# Patient Record
Sex: FEMALE | Race: WHITE | NOT HISPANIC OR LATINO | Employment: OTHER | URBAN - METROPOLITAN AREA
[De-identification: names, ages, dates, MRNs, and addresses within clinical notes are randomized per-mention and may not be internally consistent; named-entity substitution may affect disease eponyms.]

---

## 2017-11-10 ENCOUNTER — TRANSCRIBE ORDERS (OUTPATIENT)
Dept: ADMINISTRATIVE | Facility: HOSPITAL | Age: 77
End: 2017-11-10

## 2017-11-10 DIAGNOSIS — Z12.31 VISIT FOR SCREENING MAMMOGRAM: Primary | ICD-10-CM

## 2017-12-14 ENCOUNTER — HOSPITAL ENCOUNTER (OUTPATIENT)
Dept: MAMMOGRAPHY | Facility: HOSPITAL | Age: 77
Discharge: HOME | End: 2017-12-14
Admitting: INTERNAL MEDICINE

## 2017-12-14 DIAGNOSIS — Z12.31 VISIT FOR SCREENING MAMMOGRAM: ICD-10-CM

## 2017-12-14 PROCEDURE — G0202 SCR MAMMO BI INCL CAD: HCPCS

## 2018-11-30 ENCOUNTER — TRANSCRIBE ORDERS (OUTPATIENT)
Dept: ADMINISTRATIVE | Facility: HOSPITAL | Age: 78
End: 2018-11-30

## 2018-11-30 DIAGNOSIS — Z12.31 SCREENING MAMMOGRAM, ENCOUNTER FOR: Primary | ICD-10-CM

## 2019-01-10 ENCOUNTER — HOSPITAL ENCOUNTER (OUTPATIENT)
Dept: MAMMOGRAPHY | Facility: HOSPITAL | Age: 79
Discharge: HOME | End: 2019-01-10
Admitting: INTERNAL MEDICINE

## 2019-01-10 DIAGNOSIS — Z12.31 SCREENING MAMMOGRAM, ENCOUNTER FOR: ICD-10-CM

## 2019-01-10 PROCEDURE — 77063 BREAST TOMOSYNTHESIS BI: CPT

## 2019-01-10 PROCEDURE — 77067 SCR MAMMO BI INCL CAD: CPT

## 2019-01-11 ENCOUNTER — TRANSCRIBE ORDERS (OUTPATIENT)
Dept: ADMINISTRATIVE | Facility: HOSPITAL | Age: 79
End: 2019-01-11

## 2019-01-11 DIAGNOSIS — R92.8 ABNORMAL MAMMOGRAM: Primary | ICD-10-CM

## 2019-01-18 ENCOUNTER — HOSPITAL ENCOUNTER (OUTPATIENT)
Dept: MAMMOGRAPHY | Facility: HOSPITAL | Age: 79
Discharge: HOME | End: 2019-01-18
Admitting: INTERNAL MEDICINE

## 2019-01-18 DIAGNOSIS — R92.8 ABNORMAL MAMMOGRAM: ICD-10-CM

## 2019-01-18 PROCEDURE — 77065 DX MAMMO INCL CAD UNI: CPT

## 2019-03-20 DIAGNOSIS — E78.5 HYPERLIPIDEMIA, UNSPECIFIED HYPERLIPIDEMIA TYPE: Primary | ICD-10-CM

## 2019-04-25 ENCOUNTER — OFFICE VISIT (OUTPATIENT)
Dept: INTERNAL MEDICINE | Facility: CLINIC | Age: 79
End: 2019-04-25

## 2019-04-25 VITALS
DIASTOLIC BLOOD PRESSURE: 82 MMHG | HEART RATE: 68 BPM | WEIGHT: 115.6 LBS | OXYGEN SATURATION: 97 % | SYSTOLIC BLOOD PRESSURE: 132 MMHG | RESPIRATION RATE: 20 BRPM | BODY MASS INDEX: 21.83 KG/M2 | TEMPERATURE: 98 F | HEIGHT: 61 IN

## 2019-04-25 DIAGNOSIS — G89.29 CHRONIC MIDLINE LOW BACK PAIN WITHOUT SCIATICA: ICD-10-CM

## 2019-04-25 DIAGNOSIS — M54.50 CHRONIC MIDLINE LOW BACK PAIN WITHOUT SCIATICA: ICD-10-CM

## 2019-04-25 DIAGNOSIS — E03.8 OTHER SPECIFIED HYPOTHYROIDISM: ICD-10-CM

## 2019-04-25 DIAGNOSIS — E78.49 OTHER HYPERLIPIDEMIA: Primary | ICD-10-CM

## 2019-04-25 DIAGNOSIS — I34.0 NON-RHEUMATIC MITRAL REGURGITATION: ICD-10-CM

## 2019-04-25 DIAGNOSIS — E55.9 VITAMIN D DEFICIENCY: ICD-10-CM

## 2019-04-25 PROCEDURE — 99214 OFFICE O/P EST MOD 30 MIN: CPT | Performed by: INTERNAL MEDICINE

## 2019-04-25 RX ORDER — ATORVASTATIN CALCIUM 20 MG/1
20 TABLET, FILM COATED ORAL DAILY
COMMUNITY
End: 2019-12-30

## 2019-04-25 RX ORDER — HYDROCODONE BITARTRATE AND ACETAMINOPHEN 5; 325 MG/1; MG/1
1 TABLET ORAL EVERY 6 HOURS PRN
COMMUNITY
End: 2020-03-20 | Stop reason: SDUPTHER

## 2019-04-25 RX ORDER — LEVOTHYROXINE SODIUM 75 UG/1
75 CAPSULE ORAL DAILY
COMMUNITY
End: 2019-06-17 | Stop reason: SDUPTHER

## 2019-04-25 RX ORDER — LATANOPROST 50 UG/ML
1 SOLUTION/ DROPS OPHTHALMIC NIGHTLY
COMMUNITY

## 2019-04-25 RX ORDER — LEVOTHYROXINE SODIUM 50 UG/1
50 CAPSULE ORAL DAILY
COMMUNITY
End: 2019-07-03 | Stop reason: SDUPTHER

## 2019-04-25 NOTE — PROGRESS NOTES
Subjective        Chief Complaint   Patient presents with   • Follow-up     fup labs    • Back Pain       PHQ-2 Depression Screening  Little interest or pleasure in doing things? 0   Feeling down, depressed, or hopeless? 0   PHQ-2 Total Score 0     Mammogram completed 01/10/2019  01/18/2019 repeat diagnostic mammogram     Susana Hammonds is a 78 y.o. female who presents for    Patient Active Problem List   Diagnosis   • Other hyperlipidemia   • Other specified hypothyroidism   • Vitamin D deficiency   • Chronic midline low back pain without sciatica   • Non-rheumatic mitral regurgitation       History of Present Illness     She had a MMG and she had a f/u that was benign. She thinks it was a shadow. She had a cscope with Dr. Gandara that showed one polyp and diverticulosis.. She has seen gyn and had an endometrial biopsy. She denies chest pain or hematochezia.  Allergies   Allergen Reactions   • Fentanyl Nausea And Vomiting       Current Outpatient Medications on File Prior to Visit   Medication Sig Dispense Refill   • atorvastatin (LIPITOR) 20 MG tablet Take 20 mg by mouth Daily.     • doxylamine (UNISOM) 25 MG tablet Take 25 mg by mouth At Night As Needed for Sleep.     • HYDROcodone-acetaminophen (NORCO) 5-325 MG per tablet Take 1 tablet by mouth Every 6 (Six) Hours As Needed.     • latanoprost (XALATAN) 0.005 % ophthalmic solution 1 drop Every Night.     • levothyroxine sodium (TIROSINT) 50 MCG capsule Take 50 mcg by mouth Daily.     • levothyroxine sodium (TIROSINT) 75 MCG capsule Take 75 mcg by mouth Daily.       No current facility-administered medications on file prior to visit.        Past Medical History:   Diagnosis Date   • Adenomatous colon polyp    • Adnexal cyst    • Allergic    • Cataract    • Colitis    • Depression    • Diverticulitis    • Glaucoma    • Hyperlipidemia    • Hypothyroidism    • Low back pain    • Mitral valve insufficiency    • Osteopenia    • Sleep disturbance    • Vertigo        Past  "Surgical History:   Procedure Laterality Date   • BACK SURGERY      twice. one was laminectomy with discectomy   • BREAST CYST ASPIRATION     • CATARACT EXTRACTION     • COLONOSCOPY  2019    dr ortiz   • ENDOMETRIAL BIOPSY  2019   • EYE SURGERY     • HERNIA REPAIR     • TONSILLECTOMY         Family History   Problem Relation Age of Onset   • Asthma Mother    • Pancreatic cancer Mother    • Heart disease Father    • Melanoma Son        Social History     Socioeconomic History   • Marital status:      Spouse name: Not on file   • Number of children: Not on file   • Years of education: Not on file   • Highest education level: Not on file   Tobacco Use   • Smoking status: Former Smoker     Packs/day: 0.50     Years: 20.00     Pack years: 10.00     Types: Cigarettes     Last attempt to quit: 1989     Years since quittin.0   • Smokeless tobacco: Never Used   Substance and Sexual Activity   • Alcohol use: No     Frequency: Never   • Drug use: No   • Sexual activity: Defer           The following portions of the patient's history were reviewed and updated as appropriate: problem list, allergies, current medications, past medical history, past family history, past social history and past surgical history.    Review of Systems   Respiratory: Negative for shortness of breath.    Cardiovascular: Negative for chest pain.   Gastrointestinal: Negative for blood in stool.       Immunization History   Administered Date(s) Administered   • Hepatitis A 2018, 10/19/2018   • Pneumococcal Conjugate 13-Valent (PCV13) 2015   • Pneumococcal Polysaccharide (PPSV23) 2005   • Tdap 10/01/2014       Objective   Vitals:    19 1134   BP: 132/82   Pulse: 68   Resp: 20   Temp: 98 °F (36.7 °C)   TempSrc: Oral   SpO2: 97%   Weight: 52.4 kg (115 lb 9.6 oz)   Height: 154.9 cm (61\")     Physical Exam   Constitutional: She appears well-developed and well-nourished.   HENT:   Head: Normocephalic and " atraumatic.   Cardiovascular: Normal rate, regular rhythm, S1 normal, S2 normal and normal heart sounds.   Pulmonary/Chest: Effort normal and breath sounds normal.   Neurological: She is alert.   Skin: Skin is warm.   Psychiatric: She has a normal mood and affect.   Vitals reviewed.      Procedures    Assessment/Plan   Susana was seen today for follow-up and back pain.    Diagnoses and all orders for this visit:    Other hyperlipidemia    Vitamin D deficiency  -     Vitamin D 25 hydroxy; Future    Other specified hypothyroidism  -     TSH; Future    Chronic midline low back pain without sciatica    Non-rheumatic mitral regurgitation  -     Adult Transthoracic Echo Complete W/ Cont if Necessary Per Protocol; Future               Labs reviewed as well as her cscope. LDL and TSH are at goal. I will get an echo next time. She will call when she needs to [place] for her back flares. She will start vit D 2000 units daily.  Return in about 6 months (around 10/25/2019).

## 2019-06-17 RX ORDER — LEVOTHYROXINE SODIUM 75 UG/1
75 CAPSULE ORAL DAILY
Qty: 30 CAPSULE | Refills: 6 | Status: SHIPPED | OUTPATIENT
Start: 2019-06-17 | End: 2019-07-03 | Stop reason: SDUPTHER

## 2019-07-03 ENCOUNTER — TELEPHONE (OUTPATIENT)
Dept: INTERNAL MEDICINE | Facility: CLINIC | Age: 79
End: 2019-07-03

## 2019-07-03 RX ORDER — LEVOTHYROXINE SODIUM 50 UG/1
50 CAPSULE ORAL DAILY
Qty: 30 CAPSULE | Refills: 3 | Status: SHIPPED | OUTPATIENT
Start: 2019-07-03 | End: 2020-04-06 | Stop reason: SDUPTHER

## 2019-07-03 RX ORDER — LEVOTHYROXINE SODIUM 75 UG/1
75 CAPSULE ORAL DAILY
Qty: 30 CAPSULE | Refills: 3 | Status: SHIPPED | OUTPATIENT
Start: 2019-07-03 | End: 2020-05-05

## 2019-07-03 RX ORDER — LEVOTHYROXINE SODIUM 75 UG/1
75 CAPSULE ORAL DAILY
Qty: 30 CAPSULE | Refills: 3 | Status: SHIPPED | OUTPATIENT
Start: 2019-07-03 | End: 2019-07-03 | Stop reason: SDUPTHER

## 2019-07-03 NOTE — TELEPHONE ENCOUNTER
Patient needs refill Levothyroxin 50 mg. She is totally out. Pharmacy [facility] [phone].    Thanks

## 2019-10-01 ENCOUNTER — TELEPHONE (OUTPATIENT)
Dept: INTERNAL MEDICINE | Facility: CLINIC | Age: 79
End: 2019-10-01

## 2019-10-01 NOTE — TELEPHONE ENCOUNTER
Pt calling on why you ordered a echo she was not told of this please advise she was here in April and they are now just calling on this

## 2019-10-08 DIAGNOSIS — E03.8 OTHER SPECIFIED HYPOTHYROIDISM: ICD-10-CM

## 2019-10-08 DIAGNOSIS — E55.9 VITAMIN D DEFICIENCY: ICD-10-CM

## 2019-10-08 DIAGNOSIS — E78.5 HYPERLIPIDEMIA, UNSPECIFIED HYPERLIPIDEMIA TYPE: ICD-10-CM

## 2019-10-10 LAB
25(OH)D3+25(OH)D2 SERPL-MCNC: 38 NG/ML (ref 30–100)
TSH SERPL DL<=0.005 MIU/L-ACNC: 2.14 UIU/ML (ref 0.27–4.2)

## 2019-10-11 ENCOUNTER — HOSPITAL ENCOUNTER (OUTPATIENT)
Dept: CARDIOLOGY | Facility: HOSPITAL | Age: 79
Discharge: HOME | End: 2019-10-11
Admitting: INTERNAL MEDICINE

## 2019-10-11 VITALS
WEIGHT: 115 LBS | BODY MASS INDEX: 21.71 KG/M2 | HEART RATE: 87 BPM | SYSTOLIC BLOOD PRESSURE: 120 MMHG | DIASTOLIC BLOOD PRESSURE: 80 MMHG | OXYGEN SATURATION: 97 % | HEIGHT: 61 IN

## 2019-10-11 DIAGNOSIS — I34.0 NON-RHEUMATIC MITRAL REGURGITATION: ICD-10-CM

## 2019-10-11 LAB
AORTIC ARCH: 2.18 CM
AORTIC ROOT ANNULUS: 1.8 CM
ASCENDING AORTA: 3.2 CM
BH CV ECHO MEAS - ACS: 2 CM
BH CV ECHO MEAS - AO MAX PG (FULL): 1.8 MMHG
BH CV ECHO MEAS - AO MAX PG: 4.9 MMHG
BH CV ECHO MEAS - AO MEAN PG (FULL): 0.78 MMHG
BH CV ECHO MEAS - AO MEAN PG: 2.3 MMHG
BH CV ECHO MEAS - AO ROOT AREA (BSA CORRECTED): 2
BH CV ECHO MEAS - AO ROOT AREA: 7 CM^2
BH CV ECHO MEAS - AO ROOT DIAM: 3 CM
BH CV ECHO MEAS - AO V2 MAX: 110.8 CM/SEC
BH CV ECHO MEAS - AO V2 MEAN: 71.2 CM/SEC
BH CV ECHO MEAS - AO V2 VTI: 21 CM
BH CV ECHO MEAS - ASC AORTA: 3.2 CM
BH CV ECHO MEAS - AVA(I,A): 2.2 CM^2
BH CV ECHO MEAS - AVA(I,D): 2.2 CM^2
BH CV ECHO MEAS - AVA(V,A): 2.1 CM^2
BH CV ECHO MEAS - AVA(V,D): 2.1 CM^2
BH CV ECHO MEAS - BSA(HAYCOCK): 1.5 M^2
BH CV ECHO MEAS - BSA: 1.5 M^2
BH CV ECHO MEAS - BZI_BMI: 21.7 KILOGRAMS/M^2
BH CV ECHO MEAS - BZI_METRIC_HEIGHT: 154.9 CM
BH CV ECHO MEAS - BZI_METRIC_WEIGHT: 52.2 KG
BH CV ECHO MEAS - EDV(MOD-SP2): 50 ML
BH CV ECHO MEAS - EDV(MOD-SP4): 35 ML
BH CV ECHO MEAS - EDV(TEICH): 81.2 ML
BH CV ECHO MEAS - EF(CUBED): 78.8 %
BH CV ECHO MEAS - EF(MOD-BP): 65 %
BH CV ECHO MEAS - EF(MOD-SP2): 66 %
BH CV ECHO MEAS - EF(MOD-SP4): 65.7 %
BH CV ECHO MEAS - EF(TEICH): 71.4 %
BH CV ECHO MEAS - ESV(MOD-SP2): 17 ML
BH CV ECHO MEAS - ESV(MOD-SP4): 12 ML
BH CV ECHO MEAS - ESV(TEICH): 23.3 ML
BH CV ECHO MEAS - FS: 40.3 %
BH CV ECHO MEAS - IVS/LVPW: 1.1
BH CV ECHO MEAS - IVSD: 0.97 CM
BH CV ECHO MEAS - LAT PEAK E' VEL: 6 CM/SEC
BH CV ECHO MEAS - LV DIASTOLIC VOL/BSA (35-75): 23.4 ML/M^2
BH CV ECHO MEAS - LV MASS(C)D: 123.8 GRAMS
BH CV ECHO MEAS - LV MASS(C)DI: 82.9 GRAMS/M^2
BH CV ECHO MEAS - LV MAX PG: 3.2 MMHG
BH CV ECHO MEAS - LV MEAN PG: 1.6 MMHG
BH CV ECHO MEAS - LV SYSTOLIC VOL/BSA (12-30): 8 ML/M^2
BH CV ECHO MEAS - LV V1 MAX: 88.8 CM/SEC
BH CV ECHO MEAS - LV V1 MEAN: 56.2 CM/SEC
BH CV ECHO MEAS - LV V1 VTI: 17.6 CM
BH CV ECHO MEAS - LVIDD: 4.3 CM
BH CV ECHO MEAS - LVIDS: 2.5 CM
BH CV ECHO MEAS - LVLD AP2: 6.4 CM
BH CV ECHO MEAS - LVLD AP4: 6 CM
BH CV ECHO MEAS - LVLS AP2: 5.2 CM
BH CV ECHO MEAS - LVLS AP4: 4.5 CM
BH CV ECHO MEAS - LVOT AREA (M): 2.5 CM^2
BH CV ECHO MEAS - LVOT AREA: 2.6 CM^2
BH CV ECHO MEAS - LVOT DIAM: 1.8 CM
BH CV ECHO MEAS - LVPWD: 0.86 CM
BH CV ECHO MEAS - MED PEAK E' VEL: 4 CM/SEC
BH CV ECHO MEAS - MV A DUR: 0.08 SEC
BH CV ECHO MEAS - MV A MAX VEL: 123.6 CM/SEC
BH CV ECHO MEAS - MV DEC SLOPE: 291.3 CM/SEC^2
BH CV ECHO MEAS - MV DEC TIME: 0.19 SEC
BH CV ECHO MEAS - MV E MAX VEL: 78.3 CM/SEC
BH CV ECHO MEAS - MV E/A: 0.63
BH CV ECHO MEAS - MV MAX PG: 6.2 MMHG
BH CV ECHO MEAS - MV MEAN PG: 1.9 MMHG
BH CV ECHO MEAS - MV P1/2T MAX VEL: 77.1 CM/SEC
BH CV ECHO MEAS - MV P1/2T: 77.5 MSEC
BH CV ECHO MEAS - MV V2 MAX: 124.9 CM/SEC
BH CV ECHO MEAS - MV V2 MEAN: 61.6 CM/SEC
BH CV ECHO MEAS - MV V2 VTI: 26.9 CM
BH CV ECHO MEAS - MVA P1/2T LCG: 2.9 CM^2
BH CV ECHO MEAS - MVA(P1/2T): 2.8 CM^2
BH CV ECHO MEAS - MVA(VTI): 1.7 CM^2
BH CV ECHO MEAS - PA ACC TIME: 0.09 SEC
BH CV ECHO MEAS - PA MAX PG (FULL): 1.5 MMHG
BH CV ECHO MEAS - PA MAX PG: 3.5 MMHG
BH CV ECHO MEAS - PA PR(ACCEL): 39.4 MMHG
BH CV ECHO MEAS - PA V2 MAX: 93.7 CM/SEC
BH CV ECHO MEAS - PULM A REVS DUR: 0.11 SEC
BH CV ECHO MEAS - PULM A REVS VEL: 33.3 CM/SEC
BH CV ECHO MEAS - PULM DIAS VEL: 45 CM/SEC
BH CV ECHO MEAS - PULM S/D: 1.7
BH CV ECHO MEAS - PULM SYS VEL: 77.1 CM/SEC
BH CV ECHO MEAS - PVA(V,A): 2.2 CM^2
BH CV ECHO MEAS - PVA(V,D): 2.2 CM^2
BH CV ECHO MEAS - QP/QS: 0.96
BH CV ECHO MEAS - RV MAX PG: 2 MMHG
BH CV ECHO MEAS - RV MEAN PG: 1.2 MMHG
BH CV ECHO MEAS - RV V1 MAX: 70.3 CM/SEC
BH CV ECHO MEAS - RV V1 MEAN: 49.8 CM/SEC
BH CV ECHO MEAS - RV V1 VTI: 15.1 CM
BH CV ECHO MEAS - RVOT AREA: 2.9 CM^2
BH CV ECHO MEAS - RVOT DIAM: 1.9 CM
BH CV ECHO MEAS - SI(AO): 99.4 ML/M^2
BH CV ECHO MEAS - SI(CUBED): 40.8 ML/M^2
BH CV ECHO MEAS - SI(LVOT): 31 ML/M^2
BH CV ECHO MEAS - SI(MOD-SP2): 22.1 ML/M^2
BH CV ECHO MEAS - SI(MOD-SP4): 15.4 ML/M^2
BH CV ECHO MEAS - SI(TEICH): 38.8 ML/M^2
BH CV ECHO MEAS - SUP REN AO DIAM: 1.6 CM
BH CV ECHO MEAS - SV(AO): 148.4 ML
BH CV ECHO MEAS - SV(CUBED): 60.9 ML
BH CV ECHO MEAS - SV(LVOT): 46.3 ML
BH CV ECHO MEAS - SV(MOD-SP2): 33 ML
BH CV ECHO MEAS - SV(MOD-SP4): 23 ML
BH CV ECHO MEAS - SV(RVOT): 44.5 ML
BH CV ECHO MEAS - SV(TEICH): 58 ML
BH CV ECHO MEAS - TAPSE (>1.6): 1.9 CM2
BH CV ECHO MEASUREMENTS AVERAGE E/E' RATIO: 15.66
BH CV VAS BP RIGHT ARM: NORMAL MMHG
BH CV XLRA - RV BASE: 2.45 CM
BH CV XLRA - TDI S': 13 CM/SEC
LEFT ATRIUM VOLUME INDEX: 15 ML/M2
LV EF 2D ECHO EST: 65 %
SINUS: 2.7 CM
STJ: 2.6 CM

## 2019-10-11 PROCEDURE — 93306 TTE W/DOPPLER COMPLETE: CPT | Performed by: INTERNAL MEDICINE

## 2019-10-11 PROCEDURE — 93306 TTE W/DOPPLER COMPLETE: CPT

## 2019-10-17 ENCOUNTER — OFFICE VISIT (OUTPATIENT)
Dept: INTERNAL MEDICINE | Facility: CLINIC | Age: 79
End: 2019-10-17

## 2019-10-17 VITALS
HEIGHT: 62 IN | SYSTOLIC BLOOD PRESSURE: 104 MMHG | DIASTOLIC BLOOD PRESSURE: 78 MMHG | WEIGHT: 116.6 LBS | BODY MASS INDEX: 21.46 KG/M2

## 2019-10-17 DIAGNOSIS — I34.0 NON-RHEUMATIC MITRAL REGURGITATION: Primary | ICD-10-CM

## 2019-10-17 DIAGNOSIS — E55.9 VITAMIN D DEFICIENCY: ICD-10-CM

## 2019-10-17 DIAGNOSIS — E03.8 OTHER SPECIFIED HYPOTHYROIDISM: ICD-10-CM

## 2019-10-17 PROCEDURE — 99213 OFFICE O/P EST LOW 20 MIN: CPT | Performed by: INTERNAL MEDICINE

## 2019-10-17 NOTE — PROGRESS NOTES
Subjective        Chief Complaint   Patient presents with   • Follow-up     6 month fup  labs cholesterol thyroid  fup echo    • Hyperlipidemia   • Hypothyroidism           Susana Hammonds is a 79 y.o. female who presents for    Patient Active Problem List   Diagnosis   • Other hyperlipidemia   • Other specified hypothyroidism   • Vitamin D deficiency   • Chronic midline low back pain without sciatica   • Non-rheumatic mitral regurgitation       History of Present Illness     She denies chest pain or dyspnea. She is walking 3-4 days per week without any problems.  Allergies   Allergen Reactions   • Fentanyl Nausea And Vomiting       Current Outpatient Medications on File Prior to Visit   Medication Sig Dispense Refill   • atorvastatin (LIPITOR) 20 MG tablet Take 20 mg by mouth Daily.     • latanoprost (XALATAN) 0.005 % ophthalmic solution 1 drop Every Night.     • levothyroxine sodium (TIROSINT) 50 MCG capsule Take 1 capsule by mouth Daily. 30 capsule 3   • levothyroxine sodium (TIROSINT) 75 MCG capsule Take 1 capsule by mouth Daily. 30 capsule 3   • doxylamine (UNISOM) 25 MG tablet Take 25 mg by mouth At Night As Needed for Sleep.     • HYDROcodone-acetaminophen (NORCO) 5-325 MG per tablet Take 1 tablet by mouth Every 6 (Six) Hours As Needed.       No current facility-administered medications on file prior to visit.        Past Medical History:   Diagnosis Date   • Adenomatous colon polyp    • Adnexal cyst    • Allergic    • Colitis    • Depression    • Diverticulitis    • Glaucoma    • History of allergy    • Hypercholesterolemia    • Hyperlipidemia    • Hypothyroidism    • Low back pain    • Mitral valve insufficiency     mild in 2012   • Osteopenia    • Sleep disturbance    • Vertigo        Past Surgical History:   Procedure Laterality Date   • BACK SURGERY      lower back surgery   • BREAST CYST ASPIRATION     • CATARACT EXTRACTION     • CATARACT EXTRACTION     • COLONOSCOPY  04/11/2019    dr ortiz   •  "ENDOMETRIAL BIOPSY  2019   • EYE SURGERY  2015    laser surgery   • INGUINAL HERNIA REPAIR     • LAMINECTOMY      with discectomy   • TONSILLECTOMY         Family History   Problem Relation Age of Onset   • Asthma Mother    • Pancreatic cancer Mother    • Heart disease Mother    • Heart disease Father    • Melanoma Son        Social History     Socioeconomic History   • Marital status:      Spouse name: Not on file   • Number of children: Not on file   • Years of education: Not on file   • Highest education level: Not on file   Tobacco Use   • Smoking status: Former Smoker     Packs/day: 0.50     Years: 20.00     Pack years: 10.00     Types: Cigarettes     Last attempt to quit: 1989     Years since quittin.4   • Smokeless tobacco: Never Used   Substance and Sexual Activity   • Alcohol use: Yes     Frequency: Never   • Drug use: No   • Sexual activity: Defer           The following portions of the patient's history were reviewed and updated as appropriate: problem list, allergies, current medications, past medical history, past family history, past social history and past surgical history.    Review of Systems   Respiratory: Negative for shortness of breath.    Cardiovascular: Negative for chest pain.       Immunization History   Administered Date(s) Administered   • Hepatitis A 2018, 10/19/2018   • Pneumococcal Conjugate 13-Valent (PCV13) 2015   • Pneumococcal Polysaccharide (PPSV23) 2005   • Tdap 10/01/2014       Objective   Vitals:    10/17/19 1140   BP: 104/78   Weight: 52.9 kg (116 lb 9.6 oz)   Height: 157.5 cm (62\")     Physical Exam   Constitutional: She appears well-developed and well-nourished.   HENT:   Head: Normocephalic and atraumatic.   Cardiovascular: Normal rate, regular rhythm, S1 normal, S2 normal and normal heart sounds.   Pulmonary/Chest: Effort normal and breath sounds normal.   Neurological: She is alert.   Skin: Skin is warm.   Psychiatric: She has a " normal mood and affect.   Vitals reviewed.      Procedures    Assessment/Plan   Susana was seen today for follow-up, hyperlipidemia and hypothyroidism.    Diagnoses and all orders for this visit:    Non-rheumatic mitral regurgitation    Vitamin D deficiency    Other specified hypothyroidism  -     Comprehensive Metabolic Panel; Future  -     Lipid Panel With / Chol / HDL Ratio; Future  -     TSH; Future             Reviewed TSH and echo. Vit D is good now. Her MR is stable. Declines flu shot. Track down DEXA from about 2018.    Return in about 6 months (around 4/17/2020).

## 2019-12-10 ENCOUNTER — TRANSCRIBE ORDERS (OUTPATIENT)
Dept: ADMINISTRATIVE | Facility: HOSPITAL | Age: 79
End: 2019-12-10

## 2019-12-10 DIAGNOSIS — Z12.31 SCREENING MAMMOGRAM, ENCOUNTER FOR: Primary | ICD-10-CM

## 2019-12-30 RX ORDER — ATORVASTATIN CALCIUM 20 MG/1
TABLET, FILM COATED ORAL
Qty: 90 TABLET | Refills: 2 | Status: SHIPPED | OUTPATIENT
Start: 2019-12-30 | End: 2020-10-06

## 2020-01-30 ENCOUNTER — HOSPITAL ENCOUNTER (OUTPATIENT)
Dept: MAMMOGRAPHY | Facility: HOSPITAL | Age: 80
Discharge: HOME | End: 2020-01-30
Admitting: INTERNAL MEDICINE

## 2020-01-30 DIAGNOSIS — Z12.31 SCREENING MAMMOGRAM, ENCOUNTER FOR: ICD-10-CM

## 2020-01-30 PROCEDURE — 77067 SCR MAMMO BI INCL CAD: CPT

## 2020-01-30 PROCEDURE — 77063 BREAST TOMOSYNTHESIS BI: CPT

## 2020-03-16 RX ORDER — LEVOTHYROXINE SODIUM 0.07 MG/1
TABLET ORAL
Qty: 30 TABLET | Refills: 0 | Status: SHIPPED | OUTPATIENT
Start: 2020-03-16 | End: 2020-05-04

## 2020-03-20 DIAGNOSIS — G89.29 CHRONIC MIDLINE LOW BACK PAIN WITHOUT SCIATICA: Primary | ICD-10-CM

## 2020-03-20 DIAGNOSIS — M54.50 CHRONIC MIDLINE LOW BACK PAIN WITHOUT SCIATICA: Primary | ICD-10-CM

## 2020-03-20 RX ORDER — HYDROCODONE BITARTRATE AND ACETAMINOPHEN 5; 325 MG/1; MG/1
1 TABLET ORAL EVERY 12 HOURS
Qty: 60 TABLET | Refills: 0 | Status: SHIPPED | OUTPATIENT
Start: 2020-03-20 | End: 2020-06-04 | Stop reason: SDUPTHER

## 2020-04-06 RX ORDER — LEVOTHYROXINE SODIUM 50 UG/1
50 CAPSULE ORAL DAILY
Qty: 30 CAPSULE | Refills: 3 | Status: SHIPPED | OUTPATIENT
Start: 2020-04-06 | End: 2021-01-05

## 2020-04-15 ENCOUNTER — RESULTS ENCOUNTER (OUTPATIENT)
Dept: INTERNAL MEDICINE | Facility: CLINIC | Age: 80
End: 2020-04-15

## 2020-04-15 DIAGNOSIS — E03.8 OTHER SPECIFIED HYPOTHYROIDISM: ICD-10-CM

## 2020-05-04 ENCOUNTER — TELEPHONE (OUTPATIENT)
Dept: INTERNAL MEDICINE | Facility: CLINIC | Age: 80
End: 2020-05-04

## 2020-05-04 RX ORDER — LEVOTHYROXINE SODIUM 75 UG/1
TABLET ORAL
Qty: 30 TABLET | Refills: 3 | Status: SHIPPED | OUTPATIENT
Start: 2020-05-04 | End: 2020-05-05

## 2020-05-04 NOTE — TELEPHONE ENCOUNTER
PATIENT CALLED EARLIER FOR MED REFILL FOR HER    levothyroxine sodium (TIROSINT) 75 MCG capsule    AND WHEN PHARMACY CALLED HER THEY STATED IT WAS   EUTHYROX 75 MCG tablet    SHE HAS NEVER TAKEN THIS BEFORE AND THE PRICE WAS DIFFERENT. SHE WANTS TO KNOW WHY. PLEASE CALL AND ADVISE [phone]

## 2020-05-05 DIAGNOSIS — E03.8 OTHER SPECIFIED HYPOTHYROIDISM: Primary | ICD-10-CM

## 2020-05-05 RX ORDER — LEVOTHYROXINE SODIUM 75 UG/1
75 CAPSULE ORAL DAILY
Qty: 30 CAPSULE | Refills: 3 | Status: SHIPPED | OUTPATIENT
Start: 2020-05-05 | End: 2020-12-06 | Stop reason: SDUPTHER

## 2020-05-05 NOTE — TELEPHONE ENCOUNTER
PATIENT HAS CALLED TO FOLLOW UP ON HER REQUEST TO FIND OUT WHY HER PRESCRIPTION WAS CHANGED FROM LEVOTHYROXINE TO EUTHYROX. SHE'S VERY HESITANT TO CHANGE HER PRESCRIPTION WITH OUT AN EXPLANATION.   PLEASE RETURN HER CALL [phone]

## 2020-05-18 ENCOUNTER — OFFICE VISIT (OUTPATIENT)
Dept: INTERNAL MEDICINE | Facility: CLINIC | Age: 80
End: 2020-05-18

## 2020-05-18 VITALS
SYSTOLIC BLOOD PRESSURE: 112 MMHG | BODY MASS INDEX: 21.71 KG/M2 | WEIGHT: 118 LBS | TEMPERATURE: 97.9 F | HEIGHT: 62 IN | DIASTOLIC BLOOD PRESSURE: 84 MMHG

## 2020-05-18 DIAGNOSIS — H92.02 LEFT EAR PAIN: Primary | ICD-10-CM

## 2020-05-18 DIAGNOSIS — M54.2 NECK PAIN: ICD-10-CM

## 2020-05-18 PROCEDURE — 99213 OFFICE O/P EST LOW 20 MIN: CPT | Performed by: INTERNAL MEDICINE

## 2020-05-18 RX ORDER — MELOXICAM 7.5 MG/1
7.5 TABLET ORAL DAILY
Qty: 30 TABLET | Refills: 0 | Status: SHIPPED | OUTPATIENT
Start: 2020-05-18 | End: 2020-06-29 | Stop reason: SDUPTHER

## 2020-05-18 NOTE — PROGRESS NOTES
Subjective        Chief Complaint   Patient presents with   • Earache     left   • Neck Pain     left side           Susana Hammonds is a 80 y.o. female who presents for    Patient Active Problem List   Diagnosis   • Other hyperlipidemia   • Other specified hypothyroidism   • Vitamin D deficiency   • Chronic midline low back pain without sciatica   • Non-rheumatic mitral regurgitation   • Left ear pain   • Neck pain       History of Present Illness     She has left sided neck pain for 2 weeks. She did not injure herself. She denies weakness, numbness or tingling. She has taken Ibuprofen and it upset her stomach. She took 1/2 of a Norco and it eases the pain. She now has left ear pain.   Allergies   Allergen Reactions   • Fentanyl Nausea And Vomiting       Current Outpatient Medications on File Prior to Visit   Medication Sig Dispense Refill   • atorvastatin (LIPITOR) 20 MG tablet TAKE 1 TABLET BY MOUTH ONE TIME A DAY  90 tablet 2   • HYDROcodone-acetaminophen (NORCO) 5-325 MG per tablet Take 1 tablet by mouth Every 12 (Twelve) Hours. 60 tablet 0   • latanoprost (XALATAN) 0.005 % ophthalmic solution 1 drop Every Night.     • levothyroxine sodium (TIROSINT) 50 MCG capsule Take 1 capsule by mouth Daily. 30 capsule 3   • levothyroxine sodium (TIROSINT) 75 MCG capsule Take 1 capsule by mouth Daily. 30 capsule 3   • [DISCONTINUED] doxylamine (UNISOM) 25 MG tablet Take 25 mg by mouth At Night As Needed for Sleep.       No current facility-administered medications on file prior to visit.        Past Medical History:   Diagnosis Date   • Adenomatous colon polyp    • Adnexal cyst    • Allergic    • Colitis    • Depression    • Diverticulitis    • Glaucoma    • History of allergy    • Hypercholesterolemia    • Hyperlipidemia    • Hypothyroidism    • Low back pain    • Mitral valve insufficiency     mild in 2012   • Osteopenia    • Sleep disturbance    • Vertigo        Past Surgical History:   Procedure Laterality Date   • BACK  "SURGERY      lower back surgery   • BREAST CYST ASPIRATION     • CATARACT EXTRACTION     • CATARACT EXTRACTION     • COLONOSCOPY  2019    dr ortiz   • ENDOMETRIAL BIOPSY  2019   • EYE SURGERY  2015    laser surgery   • INGUINAL HERNIA REPAIR     • LAMINECTOMY      with discectomy   • TONSILLECTOMY         Family History   Problem Relation Age of Onset   • Asthma Mother    • Pancreatic cancer Mother    • Heart disease Mother    • Heart disease Father    • Melanoma Son        Social History     Socioeconomic History   • Marital status:      Spouse name: Not on file   • Number of children: Not on file   • Years of education: Not on file   • Highest education level: Not on file   Tobacco Use   • Smoking status: Former Smoker     Packs/day: 0.50     Years: 20.00     Pack years: 10.00     Types: Cigarettes     Last attempt to quit: 1989     Years since quittin.0   • Smokeless tobacco: Never Used   Substance and Sexual Activity   • Alcohol use: Yes     Frequency: Never   • Drug use: No   • Sexual activity: Defer       Advance Care Planning   ACP discussion was held with the patient during this visit. Patient does not have an advance directive, information provided.    The following portions of the patient's history were reviewed and updated as appropriate: problem list, allergies, current medications, past medical history, past family history, past social history and past surgical history.    Review of Systems   HENT: Positive for ear pain.    Musculoskeletal: Positive for neck pain.       Immunization History   Administered Date(s) Administered   • Hepatitis A 2018, 10/19/2018   • Pneumococcal Conjugate 13-Valent (PCV13) 2015   • Pneumococcal Polysaccharide (PPSV23) 2005   • Tdap 10/01/2014       Objective   Vitals:    20 1420   BP: 112/84   Temp: 97.9 °F (36.6 °C)   Weight: 53.5 kg (118 lb)   Height: 157.5 cm (62\")     Body mass index is 21.58 kg/m².  Physical Exam "   Constitutional: She appears well-developed and well-nourished.   HENT:   Right Ear: Tympanic membrane and ear canal normal.   Left Ear: Ear canal normal.   Clear fluid left TM   Lymphadenopathy:        Right cervical: No superficial cervical adenopathy present.       Left cervical: No superficial cervical adenopathy present.   Neurological: She is alert.   Reflex Scores:       Bicep reflexes are 0 on the right side and 0 on the left side.       Brachioradialis reflexes are 0 on the right side and 0 on the left side.  5/5 strength in her arms    Neck good ROM       Skin: Skin is warm.   Psychiatric: She has a normal mood and affect.   Nursing note and vitals reviewed.      Procedures    Assessment/Plan   Susana was seen today for earache and neck pain.    Diagnoses and all orders for this visit:    Left ear pain  Comments:  Start Flonase    Neck pain  Comments:  Heat and Meloxicam. Offered PT; she wants to see how she does over the next 2 weeks  Orders:  -     meloxicam (MOBIC) 7.5 MG tablet; Take 1 tablet by mouth Daily.                 No follow-ups on file.

## 2020-05-29 LAB
ALBUMIN SERPL-MCNC: 4.8 G/DL (ref 3.5–5.2)
ALBUMIN/GLOB SERPL: 2.4 G/DL
ALP SERPL-CCNC: 60 U/L (ref 39–117)
ALT SERPL-CCNC: 18 U/L (ref 1–33)
AST SERPL-CCNC: 22 U/L (ref 1–32)
BILIRUB SERPL-MCNC: 0.6 MG/DL (ref 0.2–1.2)
BUN SERPL-MCNC: 14 MG/DL (ref 8–23)
BUN/CREAT SERPL: 19.4 (ref 7–25)
CALCIUM SERPL-MCNC: 9.9 MG/DL (ref 8.6–10.5)
CHLORIDE SERPL-SCNC: 102 MMOL/L (ref 98–107)
CHOLEST SERPL-MCNC: 167 MG/DL (ref 0–200)
CHOLEST/HDLC SERPL: 1.99 {RATIO}
CO2 SERPL-SCNC: 30.4 MMOL/L (ref 22–29)
CREAT SERPL-MCNC: 0.72 MG/DL (ref 0.57–1)
GLOBULIN SER CALC-MCNC: 2 GM/DL
GLUCOSE SERPL-MCNC: 102 MG/DL (ref 65–99)
HDLC SERPL-MCNC: 84 MG/DL (ref 40–60)
LDLC SERPL CALC-MCNC: 69 MG/DL (ref 0–100)
POTASSIUM SERPL-SCNC: 4.6 MMOL/L (ref 3.5–5.2)
PROT SERPL-MCNC: 6.8 G/DL (ref 6–8.5)
SODIUM SERPL-SCNC: 141 MMOL/L (ref 136–145)
TRIGL SERPL-MCNC: 68 MG/DL (ref 0–150)
TSH SERPL DL<=0.005 MIU/L-ACNC: 2.15 UIU/ML (ref 0.27–4.2)
VLDLC SERPL CALC-MCNC: 13.6 MG/DL (ref 5–40)

## 2020-06-04 ENCOUNTER — OFFICE VISIT (OUTPATIENT)
Dept: INTERNAL MEDICINE | Facility: CLINIC | Age: 80
End: 2020-06-04

## 2020-06-04 VITALS
DIASTOLIC BLOOD PRESSURE: 82 MMHG | WEIGHT: 115 LBS | SYSTOLIC BLOOD PRESSURE: 106 MMHG | BODY MASS INDEX: 21.16 KG/M2 | HEIGHT: 62 IN

## 2020-06-04 DIAGNOSIS — M85.9 DISORDER OF BONE DENSITY AND STRUCTURE, UNSPECIFIED: ICD-10-CM

## 2020-06-04 DIAGNOSIS — E78.49 OTHER HYPERLIPIDEMIA: ICD-10-CM

## 2020-06-04 DIAGNOSIS — E03.8 OTHER SPECIFIED HYPOTHYROIDISM: Primary | ICD-10-CM

## 2020-06-04 DIAGNOSIS — G89.29 CHRONIC MIDLINE LOW BACK PAIN WITHOUT SCIATICA: ICD-10-CM

## 2020-06-04 DIAGNOSIS — M85.80 OSTEOPENIA, UNSPECIFIED LOCATION: ICD-10-CM

## 2020-06-04 DIAGNOSIS — M54.50 CHRONIC MIDLINE LOW BACK PAIN WITHOUT SCIATICA: ICD-10-CM

## 2020-06-04 PROCEDURE — 99213 OFFICE O/P EST LOW 20 MIN: CPT | Performed by: INTERNAL MEDICINE

## 2020-06-04 RX ORDER — HYDROCODONE BITARTRATE AND ACETAMINOPHEN 5; 325 MG/1; MG/1
1 TABLET ORAL EVERY 12 HOURS
Qty: 60 TABLET | Refills: 0 | Status: SHIPPED | OUTPATIENT
Start: 2020-06-04 | End: 2020-06-12 | Stop reason: SDUPTHER

## 2020-06-04 NOTE — PROGRESS NOTES
Subjective        Chief Complaint   Patient presents with   • Hypothyroidism   • Hyperlipidemia           Susana Hammonds is a 80 y.o. female who presents for    Patient Active Problem List   Diagnosis   • Other hyperlipidemia   • Other specified hypothyroidism   • Vitamin D deficiency   • Chronic midline low back pain without sciatica   • Non-rheumatic mitral regurgitation   • Neck pain       History of Present Illness     She as no chest pain or dyspnea. Her neck pain is 75 percent better. She changed insurance last year and had issues getting more than a weeks supply of Norco.  Allergies   Allergen Reactions   • Fentanyl Nausea And Vomiting       Current Outpatient Medications on File Prior to Visit   Medication Sig Dispense Refill   • atorvastatin (LIPITOR) 20 MG tablet TAKE 1 TABLET BY MOUTH ONE TIME A DAY  90 tablet 2   • latanoprost (XALATAN) 0.005 % ophthalmic solution 1 drop Every Night.     • levothyroxine sodium (TIROSINT) 50 MCG capsule Take 1 capsule by mouth Daily. 30 capsule 3   • levothyroxine sodium (TIROSINT) 75 MCG capsule Take 1 capsule by mouth Daily. 30 capsule 3   • meloxicam (MOBIC) 7.5 MG tablet Take 1 tablet by mouth Daily. 30 tablet 0   • [DISCONTINUED] HYDROcodone-acetaminophen (NORCO) 5-325 MG per tablet Take 1 tablet by mouth Every 12 (Twelve) Hours. 60 tablet 0     No current facility-administered medications on file prior to visit.        Past Medical History:   Diagnosis Date   • Adenomatous colon polyp    • Adnexal cyst    • Allergic    • Colitis    • Depression    • Diverticulitis    • Glaucoma    • History of allergy    • Hypercholesterolemia    • Hyperlipidemia    • Hypothyroidism    • Low back pain    • Mitral valve insufficiency     mild in 2012   • Osteopenia    • Sleep disturbance    • Vertigo        Past Surgical History:   Procedure Laterality Date   • BACK SURGERY      lower back surgery   • BREAST CYST ASPIRATION     • CATARACT EXTRACTION     • CATARACT EXTRACTION     •  "COLONOSCOPY  2019    dr ortiz   • ENDOMETRIAL BIOPSY  2019   • EYE SURGERY  2015    laser surgery   • INGUINAL HERNIA REPAIR     • LAMINECTOMY      with discectomy   • TONSILLECTOMY         Family History   Problem Relation Age of Onset   • Asthma Mother    • Pancreatic cancer Mother    • Heart disease Mother    • Heart disease Father    • Melanoma Son        Social History     Socioeconomic History   • Marital status:      Spouse name: Not on file   • Number of children: Not on file   • Years of education: Not on file   • Highest education level: Not on file   Tobacco Use   • Smoking status: Former Smoker     Packs/day: 0.50     Years: 20.00     Pack years: 10.00     Types: Cigarettes     Last attempt to quit: 1989     Years since quittin.1   • Smokeless tobacco: Never Used   Substance and Sexual Activity   • Alcohol use: Yes     Frequency: Never   • Drug use: No   • Sexual activity: Defer           The following portions of the patient's history were reviewed and updated as appropriate: problem list, allergies, current medications, past medical history, past family history, past social history and past surgical history.    Review of Systems   Respiratory: Negative for shortness of breath.    Cardiovascular: Negative for chest pain.       Immunization History   Administered Date(s) Administered   • Hepatitis A 2018, 10/19/2018   • Pneumococcal Conjugate 13-Valent (PCV13) 2015   • Pneumococcal Polysaccharide (PPSV23) 2005   • Tdap 10/01/2014       Objective   Vitals:    20 1507   BP: 106/82   Weight: 52.2 kg (115 lb)   Height: 157.5 cm (62\")     Body mass index is 21.03 kg/m².  Physical Exam   Constitutional: She appears well-developed and well-nourished.   HENT:   Head: Normocephalic and atraumatic.   Cardiovascular: Normal rate, regular rhythm, S1 normal, S2 normal and normal heart sounds.   Pulmonary/Chest: Effort normal and breath sounds normal. "   Neurological: She is alert.   Skin: Skin is warm.   Psychiatric: She has a normal mood and affect.   Vitals reviewed.      Procedures    Assessment/Plan   Susana was seen today for hypothyroidism and hyperlipidemia.    Diagnoses and all orders for this visit:    Other specified hypothyroidism  -     TSH; Future    Other hyperlipidemia    Osteopenia, unspecified location  -     Vitamin D 25 hydroxy; Future  -     DEXA Bone Density Axial; Future    Disorder of bone density and structure, unspecified   -     DEXA Bone Density Axial; Future    Chronic midline low back pain without sciatica  -     HYDROcodone-acetaminophen (NORCO) 5-325 MG per tablet; Take 1 tablet by mouth Every 12 (Twelve) Hours.               Refilled [place] since [facility] gave her issues as it was a new RX to them. LDL and TSH are at goal.  Return in about 6 months (around 12/4/2020) for Medicare Wellness.

## 2020-06-12 DIAGNOSIS — G89.29 CHRONIC MIDLINE LOW BACK PAIN WITHOUT SCIATICA: ICD-10-CM

## 2020-06-12 DIAGNOSIS — M54.50 CHRONIC MIDLINE LOW BACK PAIN WITHOUT SCIATICA: ICD-10-CM

## 2020-06-12 RX ORDER — HYDROCODONE BITARTRATE AND ACETAMINOPHEN 5; 325 MG/1; MG/1
1 TABLET ORAL EVERY 12 HOURS
Qty: 60 TABLET | Refills: 0 | Status: SHIPPED | OUTPATIENT
Start: 2020-06-12 | End: 2020-06-16 | Stop reason: SDUPTHER

## 2020-06-16 DIAGNOSIS — M54.50 CHRONIC MIDLINE LOW BACK PAIN WITHOUT SCIATICA: ICD-10-CM

## 2020-06-16 DIAGNOSIS — G89.29 CHRONIC MIDLINE LOW BACK PAIN WITHOUT SCIATICA: ICD-10-CM

## 2020-06-16 RX ORDER — HYDROCODONE BITARTRATE AND ACETAMINOPHEN 5; 325 MG/1; MG/1
1 TABLET ORAL EVERY 12 HOURS
Qty: 46 TABLET | Refills: 0 | Status: SHIPPED | OUTPATIENT
Start: 2020-06-16 | End: 2021-06-11 | Stop reason: SDUPTHER

## 2020-06-16 NOTE — TELEPHONE ENCOUNTER
I wrote a message along with the request that the pharmacy only had 14 available and need the remaining 46 sent it.

## 2020-06-29 DIAGNOSIS — M54.2 NECK PAIN: ICD-10-CM

## 2020-06-29 RX ORDER — MELOXICAM 7.5 MG/1
7.5 TABLET ORAL DAILY
Qty: 30 TABLET | Refills: 0 | Status: SHIPPED | OUTPATIENT
Start: 2020-06-29 | End: 2022-09-30

## 2020-10-06 RX ORDER — ATORVASTATIN CALCIUM 20 MG/1
TABLET, FILM COATED ORAL
Qty: 90 TABLET | Refills: 0 | Status: SHIPPED | OUTPATIENT
Start: 2020-10-06 | End: 2021-01-05

## 2020-12-02 ENCOUNTER — RESULTS ENCOUNTER (OUTPATIENT)
Dept: INTERNAL MEDICINE | Facility: CLINIC | Age: 80
End: 2020-12-02

## 2020-12-02 DIAGNOSIS — M85.80 OSTEOPENIA, UNSPECIFIED LOCATION: ICD-10-CM

## 2020-12-02 DIAGNOSIS — E03.8 OTHER SPECIFIED HYPOTHYROIDISM: ICD-10-CM

## 2020-12-04 ENCOUNTER — APPOINTMENT (OUTPATIENT)
Dept: BONE DENSITY | Facility: HOSPITAL | Age: 80
End: 2020-12-04

## 2020-12-04 LAB
25(OH)D3+25(OH)D2 SERPL-MCNC: 25.4 NG/ML (ref 30–100)
TSH SERPL DL<=0.005 MIU/L-ACNC: 1.42 UIU/ML (ref 0.27–4.2)

## 2020-12-06 DIAGNOSIS — E03.8 OTHER SPECIFIED HYPOTHYROIDISM: ICD-10-CM

## 2020-12-07 ENCOUNTER — TELEPHONE (OUTPATIENT)
Dept: INTERNAL MEDICINE | Facility: CLINIC | Age: 80
End: 2020-12-07

## 2020-12-07 RX ORDER — LEVOTHYROXINE SODIUM 75 UG/1
75 CAPSULE ORAL DAILY
Qty: 30 CAPSULE | Refills: 3 | Status: SHIPPED | OUTPATIENT
Start: 2020-12-07 | End: 2021-07-06

## 2020-12-07 NOTE — TELEPHONE ENCOUNTER
Ren from [facility] states patient had levothyroxine sodium (TIROSINT) 75 MCG capsule called in but they will be over $100. He would like to know if this can be switched back to tablets as it will be cheaper for patient.     Ren can be reached at [phone].

## 2020-12-10 ENCOUNTER — OFFICE VISIT (OUTPATIENT)
Dept: INTERNAL MEDICINE | Facility: CLINIC | Age: 80
End: 2020-12-10

## 2020-12-10 VITALS
TEMPERATURE: 97.1 F | WEIGHT: 112 LBS | SYSTOLIC BLOOD PRESSURE: 122 MMHG | BODY MASS INDEX: 20.61 KG/M2 | DIASTOLIC BLOOD PRESSURE: 82 MMHG | HEIGHT: 62 IN

## 2020-12-10 DIAGNOSIS — Z00.00 MEDICARE ANNUAL WELLNESS VISIT, SUBSEQUENT: Primary | ICD-10-CM

## 2020-12-10 DIAGNOSIS — K59.09 OTHER CONSTIPATION: ICD-10-CM

## 2020-12-10 DIAGNOSIS — E78.49 OTHER HYPERLIPIDEMIA: ICD-10-CM

## 2020-12-10 DIAGNOSIS — E03.8 OTHER SPECIFIED HYPOTHYROIDISM: ICD-10-CM

## 2020-12-10 DIAGNOSIS — E55.9 VITAMIN D DEFICIENCY: ICD-10-CM

## 2020-12-10 PROCEDURE — G0439 PPPS, SUBSEQ VISIT: HCPCS | Performed by: INTERNAL MEDICINE

## 2020-12-10 NOTE — PROGRESS NOTES
The ABCs of the Annual Wellness Visit  Subsequent Medicare Wellness Visit    Chief Complaint   Patient presents with   • Medicare Wellness-subsequent       Subjective   History of Present Illness:  Susana Hammonds is a 80 y.o. female who presents for a Subsequent Medicare Wellness Visit.  She needs a laxative to help move her bowels. She has taken Miralax and OTC laxatives. She has a bowel movement daily now with Smooth Move. She had a cscope last year without evidence of CA. She drinks 32 ounce of water per day. She is anxious about having her bottom teeth pulled out. She is anxious about COVID. She lives alone. She has not seen any friends since March. She does not feel like she needs to be on medication. She denies SI.   HEALTH RISK ASSESSMENT    Recent Hospitalizations:  No hospitalization(s) within the last year.    Current Medical Providers:  Patient Care Team:  Sen Bishop MD as PCP - General  Sen Bishop MD as PCP - Family Medicine    Smoking Status:  Social History     Tobacco Use   Smoking Status Former Smoker   • Packs/day: 0.50   • Years: 20.00   • Pack years: 10.00   • Types: Cigarettes   • Quit date: 1989   • Years since quittin.6   Smokeless Tobacco Never Used       Alcohol Consumption:  Social History     Substance and Sexual Activity   Alcohol Use Yes   • Frequency: Never       Depression Screen:   PHQ-2/PHQ-9 Depression Screening 12/10/2020   Little interest or pleasure in doing things 0   Feeling down, depressed, or hopeless 0   Trouble falling or staying asleep, or sleeping too much 0   Feeling tired or having little energy 0   Poor appetite or overeating 0   Feeling bad about yourself - or that you are a failure or have let yourself or your family down 0   Trouble concentrating on things, such as reading the newspaper or watching television 0   Moving or speaking so slowly that other people could have noticed. Or the opposite - being so fidgety or restless that you have  been moving around a lot more than usual 0   Thoughts that you would be better off dead, or of hurting yourself in some way 0   Total Score 0       Fall Risk Screen:  NORMA Fall Risk Assessment was completed, and patient is at LOW risk for falls.Assessment completed on:12/10/2020    Health Habits and Functional and Cognitive Screening:  Functional & Cognitive Status 12/10/2020   Do you have difficulty preparing food and eating? No   Do you have difficulty bathing yourself, getting dressed or grooming yourself? No   Do you have difficulty using the toilet? No   Do you have difficulty moving around from place to place? No   Do you have trouble with steps or getting out of a bed or a chair? No   Current Diet Well Balanced Diet   Dental Exam Up to date   Eye Exam Up to date   Exercise (times per week) 7 times per week   Current Exercises Include Walking   Do you need help using the phone?  No   Are you deaf or do you have serious difficulty hearing?  No   Do you need help with transportation? No   Do you need help shopping? No   Do you need help preparing meals?  No   Do you need help with housework?  No   Do you need help with laundry? No   Do you need help taking your medications? No   Do you need help managing money? No   Do you ever drive or ride in a car without wearing a seat belt? No   Have you felt unusual stress, anger or loneliness in the last month? No   Who do you live with? Alone   If you need help, do you have trouble finding someone available to you? No   Have you been bothered in the last four weeks by sexual problems? No   Do you have difficulty concentrating, remembering or making decisions? No         Does the patient have evidence of cognitive impairment? No    Asprin use counseling:Does not need ASA (and currently is not on it)    Age-appropriate Screening Schedule:  Refer to the list below for future screening recommendations based on patient's age, sex and/or medical conditions. Orders for these  recommended tests are listed in the plan section. The patient has been provided with a written plan.    Health Maintenance   Topic Date Due   • ZOSTER VACCINE (1 of 2) 05/12/1989   • DXA SCAN  10/05/2020   • LIPID PANEL  05/28/2021   • TDAP/TD VACCINES (2 - Td) 10/01/2024   • INFLUENZA VACCINE  Completed          The following portions of the patient's history were reviewed and updated as appropriate: allergies, current medications, past family history, past medical history, past social history, past surgical history and problem list.    Outpatient Medications Prior to Visit   Medication Sig Dispense Refill   • atorvastatin (LIPITOR) 20 MG tablet Take 1 tablet by mouth once daily 90 tablet 0   • HYDROcodone-acetaminophen (NORCO) 5-325 MG per tablet Take 1 tablet by mouth Every 12 (Twelve) Hours. 46 tablet 0   • latanoprost (XALATAN) 0.005 % ophthalmic solution 1 drop Every Night.     • levothyroxine sodium (TIROSINT) 50 MCG capsule Take 1 capsule by mouth Daily. 30 capsule 3   • levothyroxine sodium (TIROSINT) 75 MCG capsule Take 1 capsule by mouth Daily. 30 capsule 3   • meloxicam (MOBIC) 7.5 MG tablet Take 1 tablet by mouth Daily. 30 tablet 0     No facility-administered medications prior to visit.        Patient Active Problem List   Diagnosis   • Other hyperlipidemia   • Other specified hypothyroidism   • Vitamin D deficiency   • Chronic midline low back pain without sciatica   • Non-rheumatic mitral regurgitation   • Neck pain   • Medicare annual wellness visit, subsequent   • Other constipation       Advanced Care Planning:  ACP discussion was held with the patient during this visit. Patient does not have an advance directive, information provided.    Review of Systems   Gastrointestinal: Positive for constipation.   Psychiatric/Behavioral: The patient is nervous/anxious.        Compared to one year ago, the patient feels her physical health is the same.  Compared to one year ago, the patient feels her mental  "health is worse.    Reviewed chart for potential of high risk medication in the elderly: yes  Reviewed chart for potential of harmful drug interactions in the elderly:yes    Objective         Vitals:    12/10/20 1055   BP: 122/82   Temp: 97.1 °F (36.2 °C)   Weight: 50.8 kg (112 lb)   Height: 157.5 cm (62\")       Body mass index is 20.49 kg/m².  Discussed the patient's BMI with her. The BMI is in the acceptable range.    Physical Exam  Vitals signs reviewed.   Constitutional:       Appearance: She is well-developed.   HENT:      Head: Normocephalic and atraumatic.   Neck:      Vascular: No carotid bruit.   Cardiovascular:      Rate and Rhythm: Normal rate and regular rhythm.      Heart sounds: Normal heart sounds, S1 normal and S2 normal.   Pulmonary:      Effort: Pulmonary effort is normal.      Breath sounds: Normal breath sounds.   Skin:     General: Skin is warm.   Neurological:      Mental Status: She is alert.   Psychiatric:         Behavior: Behavior normal.               Assessment/Plan   Medicare Risks and Personalized Health Plan  CMS Preventative Services Quick Reference  Advance Directive Discussion  Immunizations Discussed/Encouraged (specific immunizations; Shingrix )    The above risks/problems have been discussed with the patient.  Pertinent information has been shared with the patient in the After Visit Summary.  Follow up plans and orders are seen below in the Assessment/Plan Section.    Diagnoses and all orders for this visit:    1. Medicare annual wellness visit, subsequent (Primary)    2. Vitamin D deficiency  Comments:  Recc 2000 IU vitamin D daily. She does not want a DEXA with COVID    3. Other constipation    4. Other specified hypothyroidism  -     TSH; Future    5. Other hyperlipidemia  -     Comprehensive Metabolic Panel; Future  -     Lipid Panel With / Chol / HDL Ratio; Future      Follow Up:  Return in about 6 months (around 6/10/2021), or 30 minutes.     An After Visit Summary and PPPS " were given to the patient.         TSH is at goal. Recc Shingrix at drug store. She will continue Smooth Move and increase water to 48 ounces. She does not eat many fruits or vegetables. She declines medication for anxiety.  She had a cscope last year.

## 2020-12-29 ENCOUNTER — TRANSCRIBE ORDERS (OUTPATIENT)
Dept: ADMINISTRATIVE | Facility: HOSPITAL | Age: 80
End: 2020-12-29

## 2020-12-29 DIAGNOSIS — Z12.31 VISIT FOR SCREENING MAMMOGRAM: Primary | ICD-10-CM

## 2021-01-05 RX ORDER — ATORVASTATIN CALCIUM 20 MG/1
TABLET, FILM COATED ORAL
Qty: 90 TABLET | Refills: 0 | Status: SHIPPED | OUTPATIENT
Start: 2021-01-05 | End: 2021-04-05 | Stop reason: SDUPTHER

## 2021-01-05 RX ORDER — LEVOTHYROXINE SODIUM 50 UG/1
TABLET ORAL
Qty: 30 TABLET | Refills: 5 | Status: SHIPPED | OUTPATIENT
Start: 2021-01-05 | End: 2021-06-11

## 2021-02-26 ENCOUNTER — HOSPITAL ENCOUNTER (OUTPATIENT)
Dept: MAMMOGRAPHY | Facility: HOSPITAL | Age: 81
Discharge: HOME | End: 2021-02-26
Admitting: INTERNAL MEDICINE

## 2021-02-26 DIAGNOSIS — Z12.31 VISIT FOR SCREENING MAMMOGRAM: ICD-10-CM

## 2021-02-26 PROCEDURE — 77063 BREAST TOMOSYNTHESIS BI: CPT

## 2021-02-26 PROCEDURE — 77067 SCR MAMMO BI INCL CAD: CPT

## 2021-03-02 DIAGNOSIS — Z23 IMMUNIZATION DUE: ICD-10-CM

## 2021-04-06 RX ORDER — ATORVASTATIN CALCIUM 20 MG/1
20 TABLET, FILM COATED ORAL DAILY
Qty: 90 TABLET | Refills: 3 | Status: SHIPPED | OUTPATIENT
Start: 2021-04-06 | End: 2022-04-11

## 2021-06-03 DIAGNOSIS — E03.8 OTHER SPECIFIED HYPOTHYROIDISM: ICD-10-CM

## 2021-06-03 DIAGNOSIS — E78.49 OTHER HYPERLIPIDEMIA: ICD-10-CM

## 2021-06-04 LAB
ALBUMIN SERPL-MCNC: 4.8 G/DL (ref 3.5–5.2)
ALBUMIN/GLOB SERPL: 2.4 G/DL
ALP SERPL-CCNC: 69 U/L (ref 39–117)
ALT SERPL-CCNC: 14 U/L (ref 1–33)
AST SERPL-CCNC: 18 U/L (ref 1–32)
BILIRUB SERPL-MCNC: 0.6 MG/DL (ref 0–1.2)
BUN SERPL-MCNC: 8 MG/DL (ref 8–23)
BUN/CREAT SERPL: 10 (ref 7–25)
CALCIUM SERPL-MCNC: 10.1 MG/DL (ref 8.6–10.5)
CHLORIDE SERPL-SCNC: 104 MMOL/L (ref 98–107)
CHOLEST SERPL-MCNC: 193 MG/DL (ref 0–200)
CHOLEST/HDLC SERPL: 1.79 {RATIO}
CO2 SERPL-SCNC: 22.4 MMOL/L (ref 22–29)
CREAT SERPL-MCNC: 0.8 MG/DL (ref 0.57–1)
GLOBULIN SER CALC-MCNC: 2 GM/DL
GLUCOSE SERPL-MCNC: 84 MG/DL (ref 65–99)
HDLC SERPL-MCNC: 108 MG/DL (ref 40–60)
LDLC SERPL CALC-MCNC: 72 MG/DL (ref 0–100)
POTASSIUM SERPL-SCNC: 4.5 MMOL/L (ref 3.5–5.2)
PROT SERPL-MCNC: 6.8 G/DL (ref 6–8.5)
SODIUM SERPL-SCNC: 140 MMOL/L (ref 136–145)
TRIGL SERPL-MCNC: 73 MG/DL (ref 0–150)
TSH SERPL DL<=0.005 MIU/L-ACNC: 1.53 UIU/ML (ref 0.27–4.2)
VLDLC SERPL CALC-MCNC: 13 MG/DL (ref 5–40)

## 2021-06-11 ENCOUNTER — OFFICE VISIT (OUTPATIENT)
Dept: INTERNAL MEDICINE | Facility: CLINIC | Age: 81
End: 2021-06-11

## 2021-06-11 VITALS
HEIGHT: 62 IN | DIASTOLIC BLOOD PRESSURE: 62 MMHG | WEIGHT: 113.4 LBS | SYSTOLIC BLOOD PRESSURE: 92 MMHG | BODY MASS INDEX: 20.87 KG/M2 | TEMPERATURE: 97.8 F

## 2021-06-11 DIAGNOSIS — Z78.0 POST-MENOPAUSAL: ICD-10-CM

## 2021-06-11 DIAGNOSIS — G89.29 CHRONIC MIDLINE LOW BACK PAIN WITHOUT SCIATICA: ICD-10-CM

## 2021-06-11 DIAGNOSIS — E78.49 OTHER HYPERLIPIDEMIA: Primary | Chronic | ICD-10-CM

## 2021-06-11 DIAGNOSIS — E03.8 OTHER SPECIFIED HYPOTHYROIDISM: Chronic | ICD-10-CM

## 2021-06-11 DIAGNOSIS — M54.50 CHRONIC MIDLINE LOW BACK PAIN WITHOUT SCIATICA: ICD-10-CM

## 2021-06-11 DIAGNOSIS — K59.09 OTHER CONSTIPATION: Chronic | ICD-10-CM

## 2021-06-11 PROCEDURE — 99214 OFFICE O/P EST MOD 30 MIN: CPT | Performed by: INTERNAL MEDICINE

## 2021-06-11 RX ORDER — HYDROCODONE BITARTRATE AND ACETAMINOPHEN 5; 325 MG/1; MG/1
1 TABLET ORAL EVERY 12 HOURS
Qty: 46 TABLET | Refills: 0 | Status: SHIPPED | OUTPATIENT
Start: 2021-06-11 | End: 2021-12-16 | Stop reason: SDUPTHER

## 2021-06-11 RX ORDER — LEVOTHYROXINE SODIUM 0.05 MG/1
50 TABLET ORAL DAILY
COMMUNITY
End: 2021-12-20

## 2021-06-11 NOTE — PROGRESS NOTES
Subjective        Chief Complaint   Patient presents with   • Hypothyroidism           Susana Hammonds is a 81 y.o. female who presents for    Patient Active Problem List   Diagnosis   • Other hyperlipidemia   • Other specified hypothyroidism   • Vitamin D deficiency   • Chronic midline low back pain without sciatica   • Non-rheumatic mitral regurgitation   • Neck pain   • Other constipation       History of Present Illness     She needs a refill on her Norco; she gets it filled once per year for her back pain. She is not exercising except walking her dog. Her constipation has become a chronic issue. She was taking Miralax daily and it quit working. She tried dulcolax and it works. She denies melena or hematochezia.  Allergies   Allergen Reactions   • Fentanyl Nausea And Vomiting       Current Outpatient Medications on File Prior to Visit   Medication Sig Dispense Refill   • atorvastatin (LIPITOR) 20 MG tablet Take 1 tablet by mouth Daily. 90 tablet 3   • latanoprost (XALATAN) 0.005 % ophthalmic solution 1 drop Every Night.     • levothyroxine (SYNTHROID, LEVOTHROID) 50 MCG tablet Take 50 mcg by mouth Daily.     • levothyroxine sodium (TIROSINT) 75 MCG capsule Take 1 capsule by mouth Daily. 30 capsule 3   • meloxicam (MOBIC) 7.5 MG tablet Take 1 tablet by mouth Daily. 30 tablet 0   • [DISCONTINUED] Doxylamine Succinate, Sleep, (SLEEP AID PO) Take  by mouth.     • [DISCONTINUED] HYDROcodone-acetaminophen (NORCO) 5-325 MG per tablet Take 1 tablet by mouth Every 12 (Twelve) Hours. 46 tablet 0   • [DISCONTINUED] Euthyrox 50 MCG tablet Take 1 tablet by mouth once daily 30 tablet 5     No current facility-administered medications on file prior to visit.       Past Medical History:   Diagnosis Date   • Adenomatous colon polyp    • Adnexal cyst    • Allergic    • Colitis    • Depression    • Diverticulitis    • Glaucoma    • Low back pain    • Mitral valve insufficiency     mild in 2012   • Osteopenia    • Sleep disturbance   "  • Vertigo        Past Surgical History:   Procedure Laterality Date   • BACK SURGERY      lower back surgery   • BREAST CYST ASPIRATION     • CATARACT EXTRACTION     • CATARACT EXTRACTION     • COLONOSCOPY  2019    dr ortiz   • ENDOMETRIAL BIOPSY  2019   • EYE SURGERY  2015    laser surgery   • INGUINAL HERNIA REPAIR     • LAMINECTOMY      with discectomy   • TONSILLECTOMY         Family History   Problem Relation Age of Onset   • Asthma Mother    • Pancreatic cancer Mother    • Heart disease Mother    • Heart disease Father    • Melanoma Son    • Breast cancer Neg Hx        Social History     Socioeconomic History   • Marital status:      Spouse name: Not on file   • Number of children: Not on file   • Years of education: Not on file   • Highest education level: Not on file   Tobacco Use   • Smoking status: Former Smoker     Packs/day: 0.50     Years: 20.00     Pack years: 10.00     Types: Cigarettes     Quit date: 1989     Years since quittin.1   • Smokeless tobacco: Never Used   Substance and Sexual Activity   • Alcohol use: Yes   • Drug use: No   • Sexual activity: Defer           The following portions of the patient's history were reviewed and updated as appropriate: problem list, allergies, current medications, past medical history, past family history, past social history and past surgical history.    Review of Systems    Immunization History   Administered Date(s) Administered   • COVID-19 (PFIZER) 2021, 2021   • Fluzone High Dose =>65 Years ([employer] ONLY) 10/11/2020   • Hepatitis A 2018, 10/19/2018   • Pneumococcal Conjugate 13-Valent (PCV13) 2015   • Pneumococcal Polysaccharide (PPSV23) 2005   • Tdap 10/01/2014       Objective   Vitals:    21 1122   BP: 92/62   Temp: 97.8 °F (36.6 °C)   Weight: 51.4 kg (113 lb 6.4 oz)   Height: 157.5 cm (62\")     Body mass index is 20.74 kg/m².  Physical Exam  Vitals reviewed.   Constitutional:       " Appearance: She is well-developed.   HENT:      Head: Normocephalic and atraumatic.   Cardiovascular:      Rate and Rhythm: Normal rate and regular rhythm.      Heart sounds: Normal heart sounds, S1 normal and S2 normal.      Comments: Slight edema at left ankle  Pulmonary:      Effort: Pulmonary effort is normal.      Breath sounds: Normal breath sounds.   Abdominal:      Palpations: Abdomen is soft. There is no mass.      Tenderness: There is no abdominal tenderness.   Skin:     General: Skin is warm.   Neurological:      Mental Status: She is alert.   Psychiatric:         Behavior: Behavior normal.         Procedures    Assessment/Plan   Diagnoses and all orders for this visit:    1. Other hyperlipidemia (Primary)    2. Other specified hypothyroidism  -     TSH; Future    3. Other constipation    4. Post-menopausal  -     DEXA Bone Density Axial; Future    5. Chronic midline low back pain without sciatica  -     HYDROcodone-acetaminophen (NORCO) 5-325 MG per tablet; Take 1 tablet by mouth Every 12 (Twelve) Hours.  Dispense: 46 tablet; Refill: 0             Reviewed TSH, CMP and FLP. YARED is appropriate. Refill Norco; RX lasts a year. Continue dulcolax as keeps her bowels regular.    Return in about 6 months (around 12/11/2021) for Lab Before [facility], Medicare Wellness.

## 2021-06-18 ENCOUNTER — TELEPHONE (OUTPATIENT)
Dept: INTERNAL MEDICINE | Facility: CLINIC | Age: 81
End: 2021-06-18

## 2021-06-21 ENCOUNTER — OFFICE VISIT (OUTPATIENT)
Dept: INTERNAL MEDICINE | Facility: CLINIC | Age: 81
End: 2021-06-21

## 2021-06-21 VITALS
SYSTOLIC BLOOD PRESSURE: 122 MMHG | HEIGHT: 62 IN | DIASTOLIC BLOOD PRESSURE: 80 MMHG | BODY MASS INDEX: 20.98 KG/M2 | TEMPERATURE: 97.8 F | WEIGHT: 114 LBS

## 2021-06-21 DIAGNOSIS — K59.09 OTHER CONSTIPATION: Chronic | ICD-10-CM

## 2021-06-21 DIAGNOSIS — R14.0 BLOATING: Primary | ICD-10-CM

## 2021-06-21 PROCEDURE — 99214 OFFICE O/P EST MOD 30 MIN: CPT | Performed by: INTERNAL MEDICINE

## 2021-06-21 NOTE — PROGRESS NOTES
Subjective        Chief Complaint   Patient presents with   • Constipation   • GI Problem           Susana Hammonds is a 81 y.o. female who presents for    Patient Active Problem List   Diagnosis   • Other hyperlipidemia   • Other specified hypothyroidism   • Vitamin D deficiency   • Chronic midline low back pain without sciatica   • Non-rheumatic mitral regurgitation   • Neck pain   • Other constipation       History of Present Illness     When she eats, her stomach growls but she does not hurt. She has nausea in the am once per month. She denies vomiting, melena or hematochezia. She thinks her weight is down 2-3 pounds in the last two months. She has a bowel movement with dulcolax; she takes it every other day. She had a cscope 2 years ago. She had a cyst on one ovary 2 years ago. She can get distended in her abdomen. She can feel bloated after eating. She has started some phazyme and it has helped her stomach noises. She has 32 ounces of water per day.   Allergies   Allergen Reactions   • Fentanyl Nausea And Vomiting       Current Outpatient Medications on File Prior to Visit   Medication Sig Dispense Refill   • atorvastatin (LIPITOR) 20 MG tablet Take 1 tablet by mouth Daily. 90 tablet 3   • HYDROcodone-acetaminophen (NORCO) 5-325 MG per tablet Take 1 tablet by mouth Every 12 (Twelve) Hours. 46 tablet 0   • latanoprost (XALATAN) 0.005 % ophthalmic solution 1 drop Every Night.     • levothyroxine (SYNTHROID, LEVOTHROID) 50 MCG tablet Take 50 mcg by mouth Daily.     • levothyroxine sodium (TIROSINT) 75 MCG capsule Take 1 capsule by mouth Daily. 30 capsule 3   • meloxicam (MOBIC) 7.5 MG tablet Take 1 tablet by mouth Daily. 30 tablet 0     No current facility-administered medications on file prior to visit.       Past Medical History:   Diagnosis Date   • Adenomatous colon polyp    • Adnexal cyst    • Allergic    • Colitis    • Depression    • Diverticulitis    • Glaucoma    • Low back pain    • Mitral valve  "insufficiency     mild in 2012   • Osteopenia    • Sleep disturbance    • Vertigo        Past Surgical History:   Procedure Laterality Date   • BACK SURGERY      lower back surgery   • BREAST CYST ASPIRATION     • CATARACT EXTRACTION     • CATARACT EXTRACTION     • COLONOSCOPY  2019    dr ortiz   • ENDOMETRIAL BIOPSY  2019   • EYE SURGERY  2015    laser surgery   • INGUINAL HERNIA REPAIR     • LAMINECTOMY      with discectomy   • TONSILLECTOMY         Family History   Problem Relation Age of Onset   • Asthma Mother    • Pancreatic cancer Mother    • Heart disease Mother    • Heart disease Father    • Melanoma Son    • Breast cancer Neg Hx        Social History     Socioeconomic History   • Marital status:      Spouse name: Not on file   • Number of children: Not on file   • Years of education: Not on file   • Highest education level: Not on file   Tobacco Use   • Smoking status: Former Smoker     Packs/day: 0.50     Years: 20.00     Pack years: 10.00     Types: Cigarettes     Quit date: 1989     Years since quittin.1   • Smokeless tobacco: Never Used   Substance and Sexual Activity   • Alcohol use: Yes   • Drug use: No   • Sexual activity: Defer           The following portions of the patient's history were reviewed and updated as appropriate: problem list, allergies, current medications, past medical history, past family history, past social history and past surgical history.    Review of Systems    Immunization History   Administered Date(s) Administered   • COVID-19 (PFIZER) 2021, 2021   • Fluzone High Dose =>65 Years ([employer] ONLY) 10/11/2020   • Hepatitis A 2018, 10/19/2018   • Pneumococcal Conjugate 13-Valent (PCV13) 2015   • Pneumococcal Polysaccharide (PPSV23) 2005   • Tdap 10/01/2014       Objective   Vitals:    21 1257   BP: 122/80   Temp: 97.8 °F (36.6 °C)   Weight: 51.7 kg (114 lb)   Height: 157.5 cm (62\")     Body mass index is 20.85 " kg/m².  Physical Exam  Vitals reviewed.   Constitutional:       Appearance: She is well-developed.   HENT:      Head: Normocephalic and atraumatic.   Cardiovascular:      Rate and Rhythm: Normal rate and regular rhythm.      Heart sounds: Normal heart sounds, S1 normal and S2 normal.   Pulmonary:      Effort: Pulmonary effort is normal.      Breath sounds: Normal breath sounds.   Abdominal:      Palpations: Abdomen is soft. There is no mass.      Tenderness: There is no abdominal tenderness.   Skin:     General: Skin is warm.   Neurological:      Mental Status: She is alert.   Psychiatric:         Behavior: Behavior normal.         Procedures    Assessment/Plan   Diagnoses and all orders for this visit:    1. Bloating (Primary)  Comments:  Get CT. We can also look at ovaries at same time  Orders:  -     CBC & Differential  -     CT Abdomen Pelvis With Contrast    2. Other constipation  Comments:  Discussed she can continue Dulcolax since it works well and she has tried multiple other OTCs. I sent in Linzess. She will price. She had a cscope 2 years ago  Orders:  -     CT Abdomen Pelvis With Contrast  -     linaclotide (Linzess) 72 MCG capsule capsule; Take 1 capsule by mouth Daily.  Dispense: 30 capsule; Refill: 3                 No follow-ups on file.

## 2021-06-22 LAB
BASOPHILS # BLD AUTO: 0.03 10*3/MM3 (ref 0–0.2)
BASOPHILS NFR BLD AUTO: 0.5 % (ref 0–1.5)
EOSINOPHIL # BLD AUTO: 0.04 10*3/MM3 (ref 0–0.4)
EOSINOPHIL NFR BLD AUTO: 0.7 % (ref 0.3–6.2)
ERYTHROCYTE [DISTWIDTH] IN BLOOD BY AUTOMATED COUNT: 13.1 % (ref 12.3–15.4)
HCT VFR BLD AUTO: 46.3 % (ref 34–46.6)
HGB BLD-MCNC: 15.7 G/DL (ref 12–15.9)
IMM GRANULOCYTES # BLD AUTO: 0.02 10*3/MM3 (ref 0–0.05)
IMM GRANULOCYTES NFR BLD AUTO: 0.3 % (ref 0–0.5)
LYMPHOCYTES # BLD AUTO: 1.29 10*3/MM3 (ref 0.7–3.1)
LYMPHOCYTES NFR BLD AUTO: 21.6 % (ref 19.6–45.3)
MCH RBC QN AUTO: 31.3 PG (ref 26.6–33)
MCHC RBC AUTO-ENTMCNC: 33.9 G/DL (ref 31.5–35.7)
MCV RBC AUTO: 92.2 FL (ref 79–97)
MONOCYTES # BLD AUTO: 0.65 10*3/MM3 (ref 0.1–0.9)
MONOCYTES NFR BLD AUTO: 10.9 % (ref 5–12)
NEUTROPHILS # BLD AUTO: 3.94 10*3/MM3 (ref 1.7–7)
NEUTROPHILS NFR BLD AUTO: 66 % (ref 42.7–76)
NRBC BLD AUTO-RTO: 0 /100 WBC (ref 0–0.2)
PLATELET # BLD AUTO: 257 10*3/MM3 (ref 140–450)
RBC # BLD AUTO: 5.02 10*6/MM3 (ref 3.77–5.28)
WBC # BLD AUTO: 5.97 10*3/MM3 (ref 3.4–10.8)

## 2021-07-04 DIAGNOSIS — E03.8 OTHER SPECIFIED HYPOTHYROIDISM: ICD-10-CM

## 2021-07-06 RX ORDER — LEVOTHYROXINE SODIUM 0.07 MG/1
TABLET ORAL
Qty: 30 TABLET | Refills: 11 | Status: SHIPPED | OUTPATIENT
Start: 2021-07-06 | End: 2022-10-17

## 2021-07-10 ENCOUNTER — HOSPITAL ENCOUNTER (OUTPATIENT)
Dept: CT IMAGING | Facility: HOSPITAL | Age: 81
Discharge: HOME | End: 2021-07-10
Admitting: INTERNAL MEDICINE

## 2021-07-10 PROCEDURE — 25010000002 IOPAMIDOL 61 % SOLUTION: Performed by: INTERNAL MEDICINE

## 2021-07-10 PROCEDURE — 82565 ASSAY OF CREATININE: CPT

## 2021-07-10 PROCEDURE — 74177 CT ABD & PELVIS W/CONTRAST: CPT

## 2021-07-10 PROCEDURE — 0 DIATRIZOATE MEGLUMINE & SODIUM PER 1 ML: Performed by: INTERNAL MEDICINE

## 2021-07-10 RX ADMIN — DIATRIZOATE MEGLUMINE AND DIATRIZOATE SODIUM 30 ML: 660; 100 LIQUID ORAL; RECTAL at 12:10

## 2021-07-10 RX ADMIN — IOPAMIDOL 85 ML: 612 INJECTION, SOLUTION INTRAVENOUS at 13:20

## 2021-07-12 LAB — CREAT BLDA-MCNC: 0.7 MG/DL (ref 0.6–1.3)

## 2021-12-03 ENCOUNTER — HOSPITAL ENCOUNTER (OUTPATIENT)
Dept: BONE DENSITY | Facility: HOSPITAL | Age: 81
Discharge: HOME | End: 2021-12-03
Admitting: INTERNAL MEDICINE

## 2021-12-03 DIAGNOSIS — Z78.0 POST-MENOPAUSAL: ICD-10-CM

## 2021-12-03 PROCEDURE — 77080 DXA BONE DENSITY AXIAL: CPT

## 2021-12-09 ENCOUNTER — TELEPHONE (OUTPATIENT)
Dept: INTERNAL MEDICINE | Facility: CLINIC | Age: 81
End: 2021-12-09

## 2021-12-09 NOTE — TELEPHONE ENCOUNTER
Patient is coming in for her labs tomorrow and I told her that the only lab in the system at this time was a TSH.   She said that she is also supposed to have other labs that she gets every 6 months if you would please put these in the system.  Thank you

## 2021-12-16 ENCOUNTER — OFFICE VISIT (OUTPATIENT)
Dept: INTERNAL MEDICINE | Facility: CLINIC | Age: 81
End: 2021-12-16

## 2021-12-16 VITALS
TEMPERATURE: 97.8 F | BODY MASS INDEX: 20.06 KG/M2 | SYSTOLIC BLOOD PRESSURE: 132 MMHG | WEIGHT: 109 LBS | HEIGHT: 62 IN | DIASTOLIC BLOOD PRESSURE: 82 MMHG

## 2021-12-16 DIAGNOSIS — G89.29 CHRONIC MIDLINE LOW BACK PAIN WITHOUT SCIATICA: ICD-10-CM

## 2021-12-16 DIAGNOSIS — E03.8 OTHER SPECIFIED HYPOTHYROIDISM: Chronic | ICD-10-CM

## 2021-12-16 DIAGNOSIS — Z12.31 ENCOUNTER FOR SCREENING MAMMOGRAM FOR MALIGNANT NEOPLASM OF BREAST: ICD-10-CM

## 2021-12-16 DIAGNOSIS — K59.09 OTHER CONSTIPATION: Chronic | ICD-10-CM

## 2021-12-16 DIAGNOSIS — M54.50 CHRONIC MIDLINE LOW BACK PAIN WITHOUT SCIATICA: ICD-10-CM

## 2021-12-16 DIAGNOSIS — Z00.00 MEDICARE ANNUAL WELLNESS VISIT, SUBSEQUENT: Primary | ICD-10-CM

## 2021-12-16 DIAGNOSIS — E78.49 OTHER HYPERLIPIDEMIA: Chronic | ICD-10-CM

## 2021-12-16 DIAGNOSIS — S46.211A BICEPS STRAIN, RIGHT, INITIAL ENCOUNTER: ICD-10-CM

## 2021-12-16 PROCEDURE — 90662 IIV NO PRSV INCREASED AG IM: CPT | Performed by: INTERNAL MEDICINE

## 2021-12-16 PROCEDURE — G0439 PPPS, SUBSEQ VISIT: HCPCS | Performed by: INTERNAL MEDICINE

## 2021-12-16 PROCEDURE — 1159F MED LIST DOCD IN RCRD: CPT | Performed by: INTERNAL MEDICINE

## 2021-12-16 PROCEDURE — G0008 ADMIN INFLUENZA VIRUS VAC: HCPCS | Performed by: INTERNAL MEDICINE

## 2021-12-16 PROCEDURE — 1125F AMNT PAIN NOTED PAIN PRSNT: CPT | Performed by: INTERNAL MEDICINE

## 2021-12-16 RX ORDER — HYDROCODONE BITARTRATE AND ACETAMINOPHEN 5; 325 MG/1; MG/1
1 TABLET ORAL EVERY 12 HOURS
Qty: 46 TABLET | Refills: 0 | Status: SHIPPED | OUTPATIENT
Start: 2021-12-16 | End: 2022-08-12 | Stop reason: SDUPTHER

## 2021-12-16 RX ORDER — BISACODYL 5 MG/1
5 TABLET, DELAYED RELEASE ORAL DAILY PRN
COMMUNITY
End: 2022-09-30

## 2021-12-20 RX ORDER — LEVOTHYROXINE SODIUM 0.05 MG/1
TABLET ORAL
Qty: 30 TABLET | Refills: 11 | Status: SHIPPED | OUTPATIENT
Start: 2021-12-20

## 2021-12-27 ENCOUNTER — TRANSCRIBE ORDERS (OUTPATIENT)
Dept: ADMINISTRATIVE | Facility: HOSPITAL | Age: 81
End: 2021-12-27

## 2021-12-27 DIAGNOSIS — Z12.31 VISIT FOR SCREENING MAMMOGRAM: Primary | ICD-10-CM

## 2022-01-13 ENCOUNTER — OFFICE VISIT (OUTPATIENT)
Dept: INTERNAL MEDICINE | Facility: CLINIC | Age: 82
End: 2022-01-13

## 2022-01-13 VITALS
BODY MASS INDEX: 19.91 KG/M2 | OXYGEN SATURATION: 99 % | TEMPERATURE: 97.3 F | HEART RATE: 63 BPM | HEIGHT: 62 IN | WEIGHT: 108.2 LBS

## 2022-01-13 DIAGNOSIS — L29.9 ITCH: Primary | ICD-10-CM

## 2022-01-13 PROCEDURE — 99213 OFFICE O/P EST LOW 20 MIN: CPT | Performed by: INTERNAL MEDICINE

## 2022-01-13 NOTE — PROGRESS NOTES
Subjective        Chief Complaint   Patient presents with   • Itching     BUTTOCK WASIT LINE    • Bloated           Susana Hammonds is a 81 y.o. female who presents for    Patient Active Problem List   Diagnosis   • Other hyperlipidemia   • Other specified hypothyroidism   • Vitamin D deficiency   • Chronic midline low back pain without sciatica   • Non-rheumatic mitral regurgitation   • Neck pain   • Other constipation       History of Present Illness     She started itching was around the next day after her flu shot. She never saw a rash. Prior she had not changed soaps or detergents. She did change them afterwards. The itch has stopped.  Allergies   Allergen Reactions   • Fentanyl Nausea And Vomiting       Current Outpatient Medications on File Prior to Visit   Medication Sig Dispense Refill   • atorvastatin (LIPITOR) 20 MG tablet Take 1 tablet by mouth Daily. 90 tablet 3   • bisacodyl (DULCOLAX) 5 MG EC tablet Take 5 mg by mouth Daily As Needed.     • HYDROcodone-acetaminophen (NORCO) 5-325 MG per tablet Take 1 tablet by mouth Every 12 (Twelve) Hours. 46 tablet 0   • latanoprost (XALATAN) 0.005 % ophthalmic solution 1 drop Every Night.     • levothyroxine (SYNTHROID, LEVOTHROID) 50 MCG tablet Take 1 tablet by mouth once daily 30 tablet 11   • levothyroxine (SYNTHROID, LEVOTHROID) 75 MCG tablet Take 1 tablet by mouth once daily 30 tablet 11   • meloxicam (MOBIC) 7.5 MG tablet Take 1 tablet by mouth Daily. 30 tablet 0     No current facility-administered medications on file prior to visit.       Past Medical History:   Diagnosis Date   • Adenomatous colon polyp    • Adnexal cyst    • Allergic    • Colitis    • Depression    • Diverticulitis    • Glaucoma    • Low back pain    • Mitral valve insufficiency     mild in 2012   • Osteopenia    • Sleep disturbance    • Vertigo        Past Surgical History:   Procedure Laterality Date   • BACK SURGERY      lower back surgery   • BREAST CYST ASPIRATION     • CATARACT  "EXTRACTION     • COLONOSCOPY  2019    dr ortiz   • ENDOMETRIAL BIOPSY  2019   • EYE SURGERY  2015    laser surgery   • INGUINAL HERNIA REPAIR     • LAMINECTOMY      with discectomy   • TONSILLECTOMY         Family History   Problem Relation Age of Onset   • Asthma Mother    • Pancreatic cancer Mother    • Heart disease Mother    • Heart disease Father    • Melanoma Son    • Other Brother         back surgery   • Breast cancer Neg Hx        Social History     Socioeconomic History   • Marital status:    Tobacco Use   • Smoking status: Former Smoker     Packs/day: 0.50     Years: 20.00     Pack years: 10.00     Types: Cigarettes     Quit date: 1989     Years since quittin.7   • Smokeless tobacco: Never Used   Vaping Use   • Vaping Use: Never used   Substance and Sexual Activity   • Alcohol use: Yes   • Drug use: No   • Sexual activity: Defer           The following portions of the patient's history were reviewed and updated as appropriate: problem list, allergies, current medications, past medical history, past family history, past social history and past surgical history.    Review of Systems    Immunization History   Administered Date(s) Administered   • COVID-19 (PFIZER) 2021, 2021, 2021   • Fluzone High Dose =>65 Years ([employer] ONLY) 10/11/2020   • Fluzone High-Dose 65+yrs 2021   • Hepatitis A 2018, 10/19/2018   • Pneumococcal Conjugate 13-Valent (PCV13) 2015   • Pneumococcal Polysaccharide (PPSV23) 2005   • Tdap 10/01/2014       Objective   Vitals:    22 1358   Pulse: 63   Temp: 97.3 °F (36.3 °C)   SpO2: 99%   Weight: 49.1 kg (108 lb 3.2 oz)   Height: 157.5 cm (62.01\")     Body mass index is 19.78 kg/m².  Physical Exam  Constitutional:       Appearance: Normal appearance.   Abdominal:      General: There is no distension.      Palpations: There is no mass.      Tenderness: There is no abdominal tenderness. There is no guarding. "   Skin:     Comments: No rash on her abdomen   Neurological:      Mental Status: She is alert.         Procedures    Assessment/Plan   Diagnoses and all orders for this visit:    1. Itch (Primary)        Resolved. Call if reoccurs and would get blood.         No follow-ups on file.

## 2022-02-14 ENCOUNTER — OFFICE VISIT (OUTPATIENT)
Dept: INTERNAL MEDICINE | Facility: CLINIC | Age: 82
End: 2022-02-14

## 2022-02-14 ENCOUNTER — HOSPITAL ENCOUNTER (OUTPATIENT)
Dept: CARDIOLOGY | Facility: HOSPITAL | Age: 82
Discharge: HOME | End: 2022-02-14
Admitting: INTERNAL MEDICINE

## 2022-02-14 VITALS — TEMPERATURE: 97.8 F | WEIGHT: 112 LBS | HEIGHT: 62 IN | BODY MASS INDEX: 20.61 KG/M2

## 2022-02-14 DIAGNOSIS — M79.89 CALF SWELLING: Primary | ICD-10-CM

## 2022-02-14 DIAGNOSIS — M79.89 CALF SWELLING: ICD-10-CM

## 2022-02-14 LAB
BH CV LOWER VASCULAR LEFT COMMON FEMORAL AUGMENT: NORMAL
BH CV LOWER VASCULAR LEFT COMMON FEMORAL COMPETENT: NORMAL
BH CV LOWER VASCULAR LEFT COMMON FEMORAL COMPRESS: NORMAL
BH CV LOWER VASCULAR LEFT COMMON FEMORAL PHASIC: NORMAL
BH CV LOWER VASCULAR LEFT COMMON FEMORAL SPONT: NORMAL
BH CV LOWER VASCULAR LEFT DISTAL FEMORAL COMPRESS: NORMAL
BH CV LOWER VASCULAR LEFT GASTRONEMIUS COMPRESS: NORMAL
BH CV LOWER VASCULAR LEFT GREATER SAPH AK COMPRESS: NORMAL
BH CV LOWER VASCULAR LEFT GREATER SAPH BK COMPRESS: NORMAL
BH CV LOWER VASCULAR LEFT LESSER SAPH COMPRESS: NORMAL
BH CV LOWER VASCULAR LEFT MID FEMORAL AUGMENT: NORMAL
BH CV LOWER VASCULAR LEFT MID FEMORAL COMPETENT: NORMAL
BH CV LOWER VASCULAR LEFT MID FEMORAL COMPRESS: NORMAL
BH CV LOWER VASCULAR LEFT MID FEMORAL PHASIC: NORMAL
BH CV LOWER VASCULAR LEFT MID FEMORAL SPONT: NORMAL
BH CV LOWER VASCULAR LEFT PERONEAL COMPRESS: NORMAL
BH CV LOWER VASCULAR LEFT POPLITEAL AUGMENT: NORMAL
BH CV LOWER VASCULAR LEFT POPLITEAL COMPETENT: NORMAL
BH CV LOWER VASCULAR LEFT POPLITEAL COMPRESS: NORMAL
BH CV LOWER VASCULAR LEFT POPLITEAL PHASIC: NORMAL
BH CV LOWER VASCULAR LEFT POPLITEAL SPONT: NORMAL
BH CV LOWER VASCULAR LEFT POSTERIOR TIBIAL COMPRESS: NORMAL
BH CV LOWER VASCULAR LEFT PROFUNDA FEMORAL COMPRESS: NORMAL
BH CV LOWER VASCULAR LEFT PROXIMAL FEMORAL COMPRESS: NORMAL
BH CV LOWER VASCULAR LEFT SAPHENOFEMORAL JUNCTION COMPRESS: NORMAL
BH CV LOWER VASCULAR RIGHT COMMON FEMORAL AUGMENT: NORMAL
BH CV LOWER VASCULAR RIGHT COMMON FEMORAL COMPETENT: NORMAL
BH CV LOWER VASCULAR RIGHT COMMON FEMORAL COMPRESS: NORMAL
BH CV LOWER VASCULAR RIGHT COMMON FEMORAL PHASIC: NORMAL
BH CV LOWER VASCULAR RIGHT COMMON FEMORAL SPONT: NORMAL

## 2022-02-14 PROCEDURE — 93971 EXTREMITY STUDY: CPT

## 2022-02-14 PROCEDURE — 99213 OFFICE O/P EST LOW 20 MIN: CPT | Performed by: INTERNAL MEDICINE

## 2022-02-14 NOTE — PROGRESS NOTES
Subjective        Chief Complaint   Patient presents with   • Leg Swelling           Susana Hammonds is a 81 y.o. female who presents for    Patient Active Problem List   Diagnosis   • Other hyperlipidemia   • Other specified hypothyroidism   • Vitamin D deficiency   • Chronic midline low back pain without sciatica   • Non-rheumatic mitral regurgitation   • Neck pain   • Other constipation       History of Present Illness     She has had intermittent swelling of her left leg for years that has gotten worse over the last day. She has some redness. She denies h/o blood clots. She has been sitting a lot more. She says her breathing is unchanged.  Allergies   Allergen Reactions   • Fentanyl Nausea And Vomiting       Current Outpatient Medications on File Prior to Visit   Medication Sig Dispense Refill   • atorvastatin (LIPITOR) 20 MG tablet Take 1 tablet by mouth Daily. 90 tablet 3   • bisacodyl (DULCOLAX) 5 MG EC tablet Take 5 mg by mouth Daily As Needed.     • HYDROcodone-acetaminophen (NORCO) 5-325 MG per tablet Take 1 tablet by mouth Every 12 (Twelve) Hours. 46 tablet 0   • latanoprost (XALATAN) 0.005 % ophthalmic solution 1 drop Every Night.     • levothyroxine (SYNTHROID, LEVOTHROID) 50 MCG tablet Take 1 tablet by mouth once daily 30 tablet 11   • levothyroxine (SYNTHROID, LEVOTHROID) 75 MCG tablet Take 1 tablet by mouth once daily 30 tablet 11   • meloxicam (MOBIC) 7.5 MG tablet Take 1 tablet by mouth Daily. 30 tablet 0     No current facility-administered medications on file prior to visit.       Past Medical History:   Diagnosis Date   • Adenomatous colon polyp    • Adnexal cyst    • Allergic    • Colitis    • Depression    • Diverticulitis    • Glaucoma    • Low back pain    • Mitral valve insufficiency     mild in 2012   • Osteopenia    • Sleep disturbance    • Vertigo        Past Surgical History:   Procedure Laterality Date   • BACK SURGERY      lower back surgery   • BREAST CYST ASPIRATION     • CATARACT  "EXTRACTION     • COLONOSCOPY  2019    dr ortiz   • ENDOMETRIAL BIOPSY  2019   • EYE SURGERY  2015    laser surgery   • INGUINAL HERNIA REPAIR     • LAMINECTOMY      with discectomy   • TONSILLECTOMY         Family History   Problem Relation Age of Onset   • Asthma Mother    • Pancreatic cancer Mother    • Heart disease Mother    • Heart disease Father    • Melanoma Son    • Other Brother         back surgery   • Breast cancer Neg Hx        Social History     Socioeconomic History   • Marital status:    Tobacco Use   • Smoking status: Former Smoker     Packs/day: 0.50     Years: 20.00     Pack years: 10.00     Types: Cigarettes     Quit date: 1989     Years since quittin.8   • Smokeless tobacco: Never Used   Vaping Use   • Vaping Use: Never used   Substance and Sexual Activity   • Alcohol use: Yes   • Drug use: No   • Sexual activity: Defer           The following portions of the patient's history were reviewed and updated as appropriate: problem list, allergies, current medications, past medical history, past family history, past social history and past surgical history.    Review of Systems    Immunization History   Administered Date(s) Administered   • COVID-19 (PFIZER) PURPLE CAP 2021, 2021, 2021   • Fluzone High Dose =>65 Years ([employer] ONLY) 10/11/2020   • Fluzone High-Dose 65+yrs 2021   • Hepatitis A 2018, 10/19/2018   • Pneumococcal Conjugate 13-Valent (PCV13) 2015   • Pneumococcal Polysaccharide (PPSV23) 2005   • Tdap 10/01/2014       Objective   Vitals:    22 1111   Temp: 97.8 °F (36.6 °C)   Weight: 50.8 kg (112 lb)   Height: 157.5 cm (62.02\")     Body mass index is 20.47 kg/m².  Physical Exam  Musculoskeletal:      Comments: Left leg with 2 + edema below knee. Some pinkness/redness around ankle    No cords. Negative Homans' sign.   Neurological:      Mental Status: She is alert and oriented to person, place, and time. "   Psychiatric:         Mood and Affect: Mood normal.         Procedures    Assessment/Plan   Diagnoses and all orders for this visit:    1. Calf swelling (Primary)  -     Duplex Venous Lower Extremity - Left CAR; Future               Write for compression hose. If doppler negative then I think it is venous insufficiency.  No follow-ups on file.

## 2022-03-03 ENCOUNTER — HOSPITAL ENCOUNTER (OUTPATIENT)
Dept: MAMMOGRAPHY | Facility: HOSPITAL | Age: 82
Discharge: HOME | End: 2022-03-03
Admitting: INTERNAL MEDICINE

## 2022-03-03 DIAGNOSIS — Z12.31 VISIT FOR SCREENING MAMMOGRAM: ICD-10-CM

## 2022-03-03 PROCEDURE — 77063 BREAST TOMOSYNTHESIS BI: CPT

## 2022-03-03 PROCEDURE — 77067 SCR MAMMO BI INCL CAD: CPT

## 2022-04-11 RX ORDER — ATORVASTATIN CALCIUM 20 MG/1
TABLET, FILM COATED ORAL
Qty: 90 TABLET | Refills: 0 | Status: SHIPPED | OUTPATIENT
Start: 2022-04-11 | End: 2022-07-05

## 2022-06-03 ENCOUNTER — TELEPHONE (OUTPATIENT)
Dept: INTERNAL MEDICINE | Facility: CLINIC | Age: 82
End: 2022-06-03

## 2022-06-17 ENCOUNTER — OFFICE VISIT (OUTPATIENT)
Dept: INTERNAL MEDICINE | Facility: CLINIC | Age: 82
End: 2022-06-17

## 2022-06-17 VITALS
WEIGHT: 110 LBS | SYSTOLIC BLOOD PRESSURE: 122 MMHG | BODY MASS INDEX: 20.1 KG/M2 | TEMPERATURE: 98.4 F | DIASTOLIC BLOOD PRESSURE: 80 MMHG

## 2022-06-17 DIAGNOSIS — F41.9 ANXIETY: ICD-10-CM

## 2022-06-17 DIAGNOSIS — M79.89 LEFT LEG SWELLING: ICD-10-CM

## 2022-06-17 DIAGNOSIS — E03.8 OTHER SPECIFIED HYPOTHYROIDISM: Chronic | ICD-10-CM

## 2022-06-17 DIAGNOSIS — E78.49 OTHER HYPERLIPIDEMIA: Primary | Chronic | ICD-10-CM

## 2022-06-17 PROCEDURE — 99214 OFFICE O/P EST MOD 30 MIN: CPT | Performed by: INTERNAL MEDICINE

## 2022-06-17 NOTE — PROGRESS NOTES
Subjective        Chief Complaint   Patient presents with   • Hyperlipidemia   • Hypothyroidism           Susana Hammonds is a 82 y.o. female who presents for    Patient Active Problem List   Diagnosis   • Other hyperlipidemia   • Other specified hypothyroidism   • Vitamin D deficiency   • Chronic midline low back pain without sciatica   • Non-rheumatic mitral regurgitation   • Neck pain   • Other constipation       History of Present Illness     Her left leg continues to swell. She is using compression hose that she just bought. It gets better at night. She denies shortness of breath. She saw a vascular surgeon before. She is getting more anxious as she ages. Her kids are retiring and moving. She is getting a new AC unit as well.  Allergies   Allergen Reactions   • Fentanyl Nausea And Vomiting       Current Outpatient Medications on File Prior to Visit   Medication Sig Dispense Refill   • atorvastatin (LIPITOR) 20 MG tablet Take 1 tablet by mouth once daily 90 tablet 0   • bisacodyl (DULCOLAX) 5 MG EC tablet Take 5 mg by mouth Daily As Needed.     • HYDROcodone-acetaminophen (NORCO) 5-325 MG per tablet Take 1 tablet by mouth Every 12 (Twelve) Hours. 46 tablet 0   • latanoprost (XALATAN) 0.005 % ophthalmic solution 1 drop Every Night.     • levothyroxine (SYNTHROID, LEVOTHROID) 50 MCG tablet Take 1 tablet by mouth once daily 30 tablet 11   • levothyroxine (SYNTHROID, LEVOTHROID) 75 MCG tablet Take 1 tablet by mouth once daily 30 tablet 11   • meloxicam (MOBIC) 7.5 MG tablet Take 1 tablet by mouth Daily. 30 tablet 0     No current facility-administered medications on file prior to visit.       Past Medical History:   Diagnosis Date   • Adenomatous colon polyp    • Adnexal cyst    • Allergic    • Colitis    • Depression    • Diverticulitis    • Glaucoma    • Low back pain    • Mitral valve insufficiency     mild in 2012   • Osteopenia    • Sleep disturbance    • Vertigo        Past Surgical History:   Procedure  Laterality Date   • BACK SURGERY      lower back surgery   • BREAST CYST ASPIRATION      years ago. Unsure which breast    • CATARACT EXTRACTION     • COLONOSCOPY  2019    dr ortiz   • ENDOMETRIAL BIOPSY  2019   • EYE SURGERY  2015    laser surgery   • INGUINAL HERNIA REPAIR     • LAMINECTOMY      with discectomy   • TONSILLECTOMY         Family History   Problem Relation Age of Onset   • Asthma Mother    • Pancreatic cancer Mother    • Heart disease Mother    • Heart disease Father    • Melanoma Son    • Other Brother         back surgery   • Breast cancer Neg Hx    • Ovarian cancer Neg Hx        Social History     Socioeconomic History   • Marital status:    Tobacco Use   • Smoking status: Former Smoker     Packs/day: 0.50     Years: 20.00     Pack years: 10.00     Types: Cigarettes     Quit date: 1989     Years since quittin.1   • Smokeless tobacco: Never Used   Vaping Use   • Vaping Use: Never used   Substance and Sexual Activity   • Alcohol use: Yes   • Drug use: No   • Sexual activity: Defer           The following portions of the patient's history were reviewed and updated as appropriate: problem list, allergies, current medications, past medical history, past family history, past social history and past surgical history.    Review of Systems    Immunization History   Administered Date(s) Administered   • COVID-19 (PFIZER) PURPLE CAP 2021, 2021, 2021   • Covid-19 (Pfizer) Gray Cap 2022   • Fluzone High Dose =>65 Years ([employer] ONLY) 10/11/2020   • Fluzone High-Dose 65+yrs 2021   • Hepatitis A 2018, 10/19/2018   • Pneumococcal Conjugate 13-Valent (PCV13) 2015   • Pneumococcal Polysaccharide (PPSV23) 2005   • Tdap 10/01/2014       Objective   Vitals:    22 1249   BP: 122/80   Temp: 98.4 °F (36.9 °C)   Weight: 49.9 kg (110 lb)     Body mass index is 20.1 kg/m².  Physical Exam  Vitals reviewed.   Constitutional:        Appearance: She is well-developed.   HENT:      Head: Normocephalic and atraumatic.   Cardiovascular:      Rate and Rhythm: Normal rate and regular rhythm.      Heart sounds: Normal heart sounds, S1 normal and S2 normal.      Comments: 1+ edema left leg with varicose veins. No cords  Pulmonary:      Effort: Pulmonary effort is normal.      Breath sounds: Normal breath sounds.   Abdominal:      General: There is no distension.      Palpations: Abdomen is soft. There is no mass.   Skin:     General: Skin is warm.   Neurological:      Mental Status: She is alert.   Psychiatric:         Behavior: Behavior normal.         Procedures    Assessment & Plan   Diagnoses and all orders for this visit:    1. Other hyperlipidemia (Primary)  Comments:  LDL is stable    2. Other specified hypothyroidism  Comments:  TSH is at goal  Orders:  -     TSH; Future    3. Anxiety  Comments:  Declines SSRI    4. Left leg swelling  Comments:  Offered vascular surgeon; she declines. Recc use compression hose all the time               Reviewed cmp, flp and tsh.  Return in about 6 months (around 12/17/2022) for Medicare Wellness, Lab Before FUP.

## 2022-07-05 RX ORDER — ATORVASTATIN CALCIUM 20 MG/1
TABLET, FILM COATED ORAL
Qty: 90 TABLET | Refills: 3 | Status: SHIPPED | OUTPATIENT
Start: 2022-07-05

## 2022-07-08 ENCOUNTER — TELEPHONE (OUTPATIENT)
Dept: INTERNAL MEDICINE | Facility: CLINIC | Age: 82
End: 2022-07-08

## 2022-07-08 DIAGNOSIS — M79.89 LEFT LEG SWELLING: Primary | ICD-10-CM

## 2022-07-08 NOTE — TELEPHONE ENCOUNTER
Caller: Susana Hammonds    Relationship: Self    Best call back number: [phone]    What is the medical concern/diagnosis: LEFT LEG SWELLING    What specialty or service is being requested: VASCULAR DOCTOR    Any additional details: PATIENT WAS IN TO SEE DR. VILLAREAL ON 6/17/22 AND THEY DISCUSSED THIS SWELLING AND HE ADVISED HER IF IT DID NOT GET ANY BETTER TO LET HIM KNOW AND HE COULD REFER HER TO A VASCULAR DOCTOR.    PLEASE ADVISE

## 2022-08-12 DIAGNOSIS — G89.29 CHRONIC MIDLINE LOW BACK PAIN WITHOUT SCIATICA: ICD-10-CM

## 2022-08-12 DIAGNOSIS — M54.50 CHRONIC MIDLINE LOW BACK PAIN WITHOUT SCIATICA: ICD-10-CM

## 2022-08-12 NOTE — TELEPHONE ENCOUNTER
Caller: Susana Hammonds    Relationship: Self    Best call back number: [phone]  Requested Prescriptions:   Requested Prescriptions     Pending Prescriptions Disp Refills   • HYDROcodone-acetaminophen (NORCO) 5-325 MG per tablet 46 tablet 0     Sig: Take 1 tablet by mouth Every 12 (Twelve) Hours.        Pharmacy where request should be sent: [facility] [address] [place] [address], [place] - [address] [place] [phone] [place] [phone] FX         Does the patient have less than a 3 day supply:  [x] Yes  [] No    Dawson Vogel Rep   08/12/22 13:16 EDT

## 2022-08-14 RX ORDER — HYDROCODONE BITARTRATE AND ACETAMINOPHEN 5; 325 MG/1; MG/1
1 TABLET ORAL EVERY 12 HOURS
Qty: 46 TABLET | Refills: 0 | Status: SHIPPED | OUTPATIENT
Start: 2022-08-14 | End: 2022-12-27 | Stop reason: SDUPTHER

## 2022-09-08 ENCOUNTER — OFFICE VISIT (OUTPATIENT)
Dept: INTERNAL MEDICINE | Facility: CLINIC | Age: 82
End: 2022-09-08

## 2022-09-08 VITALS — TEMPERATURE: 97.8 F | WEIGHT: 107 LBS | HEIGHT: 62 IN | BODY MASS INDEX: 19.69 KG/M2

## 2022-09-08 DIAGNOSIS — M79.18 BUTTOCK PAIN: Primary | ICD-10-CM

## 2022-09-08 PROCEDURE — 99213 OFFICE O/P EST LOW 20 MIN: CPT | Performed by: INTERNAL MEDICINE

## 2022-09-08 NOTE — PROGRESS NOTES
Subjective        Chief Complaint   Patient presents with   • Hip Pain           Susana Hammonds is a 82 y.o. female who presents for    Patient Active Problem List   Diagnosis   • Other hyperlipidemia   • Other specified hypothyroidism   • Vitamin D deficiency   • Chronic midline low back pain without sciatica   • Non-rheumatic mitral regurgitation   • Neck pain   • Other constipation       History of Present Illness     She has a burning sensation in her right buttocks for the last 2 months.Nothing makes it worse. She puts an OTC lidocaine cream on it and it will help. The pain does not go down her leg. She denies weakness. She denies rash.   Allergies   Allergen Reactions   • Fentanyl Nausea And Vomiting       Current Outpatient Medications on File Prior to Visit   Medication Sig Dispense Refill   • atorvastatin (LIPITOR) 20 MG tablet Take 1 tablet by mouth once daily 90 tablet 3   • bisacodyl (DULCOLAX) 5 MG EC tablet Take 5 mg by mouth Daily As Needed.     • HYDROcodone-acetaminophen (NORCO) 5-325 MG per tablet Take 1 tablet by mouth Every 12 (Twelve) Hours. 46 tablet 0   • latanoprost (XALATAN) 0.005 % ophthalmic solution 1 drop Every Night.     • levothyroxine (SYNTHROID, LEVOTHROID) 50 MCG tablet Take 1 tablet by mouth once daily 30 tablet 11   • levothyroxine (SYNTHROID, LEVOTHROID) 75 MCG tablet Take 1 tablet by mouth once daily 30 tablet 11   • meloxicam (MOBIC) 7.5 MG tablet Take 1 tablet by mouth Daily. 30 tablet 0   • Psyllium (METAMUCIL PO) Take  by mouth.       No current facility-administered medications on file prior to visit.       Past Medical History:   Diagnosis Date   • Adenomatous colon polyp    • Adnexal cyst    • Allergic    • Colitis    • Depression    • Diverticulitis    • Glaucoma    • Low back pain    • Mitral valve insufficiency     mild in 2012   • Osteopenia    • Sleep disturbance    • Vertigo        Past Surgical History:   Procedure Laterality Date   • BACK SURGERY      lower back  "surgery   • BREAST CYST ASPIRATION      years ago. Unsure which breast    • CATARACT EXTRACTION     • COLONOSCOPY  2019    dr ortiz   • ENDOMETRIAL BIOPSY  2019   • EYE SURGERY  2015    laser surgery   • INGUINAL HERNIA REPAIR     • LAMINECTOMY      with discectomy   • TONSILLECTOMY         Family History   Problem Relation Age of Onset   • Asthma Mother    • Pancreatic cancer Mother    • Heart disease Mother    • Heart disease Father    • Melanoma Son    • Other Brother         back surgery   • Breast cancer Neg Hx    • Ovarian cancer Neg Hx        Social History     Socioeconomic History   • Marital status:    Tobacco Use   • Smoking status: Former Smoker     Packs/day: 0.50     Years: 20.00     Pack years: 10.00     Types: Cigarettes     Quit date: 1989     Years since quittin.3   • Smokeless tobacco: Never Used   Vaping Use   • Vaping Use: Never used   Substance and Sexual Activity   • Alcohol use: Yes   • Drug use: No   • Sexual activity: Defer           The following portions of the patient's history were reviewed and updated as appropriate: problem list, allergies, current medications, past medical history, past family history, past social history and past surgical history.    Review of Systems    Immunization History   Administered Date(s) Administered   • COVID-19 (PFIZER) PURPLE CAP 2021, 2021, 2021   • Covid-19 (Pfizer) Gray Cap 2022   • Fluzone High Dose =>65 Years ([employer] ONLY) 10/11/2020   • Fluzone High-Dose 65+yrs 2021   • Hepatitis A 2018, 10/19/2018   • Pneumococcal Conjugate 13-Valent (PCV13) 2015   • Pneumococcal Polysaccharide (PPSV23) 2005   • Tdap 10/01/2014       Objective   Vitals:    22 1447   Temp: 97.8 °F (36.6 °C)   Weight: 48.5 kg (107 lb)   Height: 157.5 cm (62.01\")     Body mass index is 19.57 kg/m².  Physical Exam  Neurological:      Mental Status: She is alert and oriented to person, place, and " time.      Deep Tendon Reflexes:      Reflex Scores:       Patellar reflexes are 1+ on the right side and 1+ on the left side.       Achilles reflexes are 0 on the right side and 0 on the left side.     Comments: 5/5 strength in legs    Negative SLR    No rash on buttocks   Psychiatric:         Mood and Affect: Mood normal.         Procedures    Assessment & Plan   Diagnoses and all orders for this visit:    1. Buttock pain (Primary)  Comments:  Offered PT as it sounds like back related. She will think about             Answered questions about COVID omicron shot. Continue OTC lidocaine cream.    No follow-ups on file.

## 2022-09-29 ENCOUNTER — TELEPHONE (OUTPATIENT)
Dept: INTERNAL MEDICINE | Facility: CLINIC | Age: 82
End: 2022-09-29

## 2022-09-29 NOTE — TELEPHONE ENCOUNTER
Patient is not feeling well, she has a headache, she's nauseated and has back and stomach pain.  She went to [facility] and tested negative for Covid this morning.  She is also very lightheaded.  She can be reached at [phone].

## 2022-09-30 ENCOUNTER — OFFICE VISIT (OUTPATIENT)
Dept: INTERNAL MEDICINE | Facility: CLINIC | Age: 82
End: 2022-09-30

## 2022-09-30 ENCOUNTER — LAB (OUTPATIENT)
Dept: LAB | Facility: HOSPITAL | Age: 82
End: 2022-09-30

## 2022-09-30 VITALS
TEMPERATURE: 97.8 F | DIASTOLIC BLOOD PRESSURE: 78 MMHG | SYSTOLIC BLOOD PRESSURE: 116 MMHG | BODY MASS INDEX: 20.43 KG/M2 | HEIGHT: 62 IN | WEIGHT: 111 LBS

## 2022-09-30 DIAGNOSIS — R42 DIZZINESS: ICD-10-CM

## 2022-09-30 DIAGNOSIS — G89.29 CHRONIC MIDLINE LOW BACK PAIN WITHOUT SCIATICA: ICD-10-CM

## 2022-09-30 DIAGNOSIS — K59.09 OTHER CONSTIPATION: Primary | Chronic | ICD-10-CM

## 2022-09-30 DIAGNOSIS — M54.50 CHRONIC MIDLINE LOW BACK PAIN WITHOUT SCIATICA: ICD-10-CM

## 2022-09-30 DIAGNOSIS — R11.0 NAUSEA: ICD-10-CM

## 2022-09-30 LAB
ALBUMIN SERPL-MCNC: 4.7 G/DL (ref 3.5–5.2)
ALBUMIN/GLOB SERPL: 2.8 G/DL
ALP SERPL-CCNC: 63 U/L (ref 39–117)
ALT SERPL W P-5'-P-CCNC: 17 U/L (ref 1–33)
ANION GAP SERPL CALCULATED.3IONS-SCNC: 9.8 MMOL/L (ref 5–15)
AST SERPL-CCNC: 20 U/L (ref 1–32)
BASOPHILS # BLD AUTO: 0.05 10*3/MM3 (ref 0–0.2)
BASOPHILS NFR BLD AUTO: 0.8 % (ref 0–1.5)
BILIRUB SERPL-MCNC: 0.6 MG/DL (ref 0–1.2)
BUN SERPL-MCNC: 12 MG/DL (ref 8–23)
BUN/CREAT SERPL: 16.9 (ref 7–25)
CALCIUM SPEC-SCNC: 9.7 MG/DL (ref 8.6–10.5)
CHLORIDE SERPL-SCNC: 102 MMOL/L (ref 98–107)
CO2 SERPL-SCNC: 25.2 MMOL/L (ref 22–29)
CREAT SERPL-MCNC: 0.71 MG/DL (ref 0.57–1)
DEPRECATED RDW RBC AUTO: 43.7 FL (ref 37–54)
EGFRCR SERPLBLD CKD-EPI 2021: 84.5 ML/MIN/1.73
EOSINOPHIL # BLD AUTO: 0.04 10*3/MM3 (ref 0–0.4)
EOSINOPHIL NFR BLD AUTO: 0.6 % (ref 0.3–6.2)
ERYTHROCYTE [DISTWIDTH] IN BLOOD BY AUTOMATED COUNT: 12.5 % (ref 12.3–15.4)
GLOBULIN UR ELPH-MCNC: 1.7 GM/DL
GLUCOSE SERPL-MCNC: 104 MG/DL (ref 65–99)
HCT VFR BLD AUTO: 45.9 % (ref 34–46.6)
HGB BLD-MCNC: 14.8 G/DL (ref 12–15.9)
IMM GRANULOCYTES # BLD AUTO: 0.01 10*3/MM3 (ref 0–0.05)
IMM GRANULOCYTES NFR BLD AUTO: 0.2 % (ref 0–0.5)
LIPASE SERPL-CCNC: 30 U/L (ref 13–60)
LYMPHOCYTES # BLD AUTO: 1.03 10*3/MM3 (ref 0.7–3.1)
LYMPHOCYTES NFR BLD AUTO: 16.1 % (ref 19.6–45.3)
MCH RBC QN AUTO: 30.5 PG (ref 26.6–33)
MCHC RBC AUTO-ENTMCNC: 32.2 G/DL (ref 31.5–35.7)
MCV RBC AUTO: 94.6 FL (ref 79–97)
MONOCYTES # BLD AUTO: 0.5 10*3/MM3 (ref 0.1–0.9)
MONOCYTES NFR BLD AUTO: 7.8 % (ref 5–12)
NEUTROPHILS NFR BLD AUTO: 4.76 10*3/MM3 (ref 1.7–7)
NEUTROPHILS NFR BLD AUTO: 74.5 % (ref 42.7–76)
NRBC BLD AUTO-RTO: 0 /100 WBC (ref 0–0.2)
PLATELET # BLD AUTO: 224 10*3/MM3 (ref 140–450)
PMV BLD AUTO: 11.2 FL (ref 6–12)
POTASSIUM SERPL-SCNC: 4 MMOL/L (ref 3.5–5.2)
PROT SERPL-MCNC: 6.4 G/DL (ref 6–8.5)
RBC # BLD AUTO: 4.85 10*6/MM3 (ref 3.77–5.28)
SODIUM SERPL-SCNC: 137 MMOL/L (ref 136–145)
WBC NRBC COR # BLD: 6.39 10*3/MM3 (ref 3.4–10.8)

## 2022-09-30 PROCEDURE — 99214 OFFICE O/P EST MOD 30 MIN: CPT | Performed by: INTERNAL MEDICINE

## 2022-09-30 PROCEDURE — 36415 COLL VENOUS BLD VENIPUNCTURE: CPT | Performed by: INTERNAL MEDICINE

## 2022-09-30 PROCEDURE — 80053 COMPREHEN METABOLIC PANEL: CPT | Performed by: INTERNAL MEDICINE

## 2022-09-30 PROCEDURE — 83690 ASSAY OF LIPASE: CPT | Performed by: INTERNAL MEDICINE

## 2022-09-30 PROCEDURE — 85025 COMPLETE CBC W/AUTO DIFF WBC: CPT | Performed by: INTERNAL MEDICINE

## 2022-09-30 PROCEDURE — 90662 IIV NO PRSV INCREASED AG IM: CPT | Performed by: INTERNAL MEDICINE

## 2022-09-30 PROCEDURE — G0008 ADMIN INFLUENZA VIRUS VAC: HCPCS | Performed by: INTERNAL MEDICINE

## 2022-09-30 NOTE — PROGRESS NOTES
Subjective        Chief Complaint   Patient presents with   • Headache   • Nausea           Susana Hammonds is a 82 y.o. female who presents for    Patient Active Problem List   Diagnosis   • Other hyperlipidemia   • Other specified hypothyroidism   • Vitamin D deficiency   • Chronic midline low back pain without sciatica   • Non-rheumatic mitral regurgitation   • Neck pain   • Other constipation       History of Present Illness     She has had nausea when she wakes up. She has had it for a week. She has not vomited or had any diarrhea. She has a stomach ache when she eats. She has been dizzy as well the last few days. She had a negative COVID test at the [facility]. She has been taking Zofran. She denies melena or hematochezia. Her last BM was Monday. She has tried Metamucil and Dulcolax for her constipation. She is still having some back pain. It kept her up last night. She denies incontinence. She has a frontal sinus HA.  Allergies   Allergen Reactions   • Fentanyl Nausea And Vomiting       Current Outpatient Medications on File Prior to Visit   Medication Sig Dispense Refill   • atorvastatin (LIPITOR) 20 MG tablet Take 1 tablet by mouth once daily 90 tablet 3   • HYDROcodone-acetaminophen (NORCO) 5-325 MG per tablet Take 1 tablet by mouth Every 12 (Twelve) Hours. 46 tablet 0   • latanoprost (XALATAN) 0.005 % ophthalmic solution 1 drop Every Night.     • levothyroxine (SYNTHROID, LEVOTHROID) 50 MCG tablet Take 1 tablet by mouth once daily 30 tablet 11   • levothyroxine (SYNTHROID, LEVOTHROID) 75 MCG tablet Take 1 tablet by mouth once daily 30 tablet 11   • [DISCONTINUED] bisacodyl (DULCOLAX) 5 MG EC tablet Take 5 mg by mouth Daily As Needed.     • [DISCONTINUED] meloxicam (MOBIC) 7.5 MG tablet Take 1 tablet by mouth Daily. 30 tablet 0   • [DISCONTINUED] Psyllium (METAMUCIL PO) Take  by mouth.       No current facility-administered medications on file prior to visit.       Past Medical History:   Diagnosis Date   •  Adenomatous colon polyp    • Adnexal cyst    • Allergic    • Colitis    • Depression    • Diverticulitis    • Glaucoma    • Low back pain    • Mitral valve insufficiency     mild in 2012   • Osteopenia    • Sleep disturbance    • Vertigo        Past Surgical History:   Procedure Laterality Date   • BACK SURGERY     • BREAST CYST ASPIRATION     • CATARACT EXTRACTION     • COLONOSCOPY  2019    dr ortiz   • ENDOMETRIAL BIOPSY  2019   • EYE SURGERY  2015   • INGUINAL HERNIA REPAIR     • LAMINECTOMY     • TONSILLECTOMY         Family History   Problem Relation Age of Onset   • Asthma Mother    • Pancreatic cancer Mother    • Heart disease Mother    • Heart disease Father    • Melanoma Son    • Other Brother         back surgery   • Breast cancer Neg Hx    • Ovarian cancer Neg Hx        Social History     Socioeconomic History   • Marital status:    Tobacco Use   • Smoking status: Former Smoker     Packs/day: 0.50     Years: 20.00     Pack years: 10.00     Types: Cigarettes     Quit date: 1989     Years since quittin.4   • Smokeless tobacco: Never Used   Vaping Use   • Vaping Use: Never used   Substance and Sexual Activity   • Alcohol use: Yes   • Drug use: No   • Sexual activity: Defer           The following portions of the patient's history were reviewed and updated as appropriate: problem list, allergies, current medications, past medical history, past family history, past social history and past surgical history.    Review of Systems    Immunization History   Administered Date(s) Administered   • COVID-19 (PFIZER) PURPLE CAP 2021, 2021, 2021   • Covid-19 (Pfizer) Gray Cap 2022   • Fluzone High Dose =>65 Years ([employer] ONLY) 10/11/2020   • Fluzone High-Dose 65+yrs 2021   • Hepatitis A 2018, 10/19/2018   • Pneumococcal Conjugate 13-Valent (PCV13) 2015   • Pneumococcal Polysaccharide (PPSV23) 2005, 2020   • Tdap 10/01/2014  "      Objective   Vitals:    09/30/22 1437   BP: 116/78   Temp: 97.8 °F (36.6 °C)   Weight: 50.3 kg (111 lb)   Height: 157.5 cm (62.01\")     Body mass index is 20.3 kg/m².  Physical Exam  Vitals reviewed.   Constitutional:       Appearance: She is well-developed.   HENT:      Head: Normocephalic and atraumatic.   Cardiovascular:      Rate and Rhythm: Normal rate and regular rhythm.      Heart sounds: Normal heart sounds, S1 normal and S2 normal.   Pulmonary:      Effort: Pulmonary effort is normal.      Breath sounds: Normal breath sounds.   Abdominal:      General: There is no distension.      Palpations: Abdomen is soft. There is no mass.   Skin:     General: Skin is warm.   Neurological:      Mental Status: She is alert.   Psychiatric:         Behavior: Behavior normal.         Procedures    Assessment & Plan   Diagnoses and all orders for this visit:    1. Other constipation (Primary)  -     linaclotide (Linzess) 72 MCG capsule capsule; Take 1 capsule by mouth Every Morning Before Breakfast.  Dispense: 30 capsule; Refill: 2    2. Nausea  -     CBC & Differential  -     Comprehensive Metabolic Panel  -     Lipase    3. Dizziness    4. Chronic midline low back pain without sciatica  -     Ambulatory Referral to Physical Therapy Evaluate and treat    Other orders  -     Fluzone High-Dose 65+yrs (0860-2350)        Flu shot today. I wonder if not a viral component to some of her symptoms this week. I will get blood work and start Linzess. She will call next Friday to let me know how she is doing. Consider imaging if does not improve. Continue Zofran prn. She will get a fleets enema for tonight.         No follow-ups on file.  "

## 2022-10-04 ENCOUNTER — TELEPHONE (OUTPATIENT)
Dept: INTERNAL MEDICINE | Facility: CLINIC | Age: 82
End: 2022-10-04

## 2022-10-04 DIAGNOSIS — K59.09 OTHER CONSTIPATION: Primary | ICD-10-CM

## 2022-10-04 NOTE — TELEPHONE ENCOUNTER
Patient has questions about medication (Linzess), also wasn't told that she has IBS, medication is expensive, please advise [phone]

## 2022-10-04 NOTE — TELEPHONE ENCOUNTER
I am referring to her Dr. Gandara. Linzess is used for constipation as well as IBS. If she takes it daily then she will likely have a bowel movement That is why I gave her samples.

## 2022-10-04 NOTE — TELEPHONE ENCOUNTER
She said she googled it and said it was for IBS. She said she must have IBS (even though she is constipation) I told her this is used for diarrhea. Took it 2 nights in a row and it did not help at all did not have 1 bowel moment. Plus insurance is charging $500. She said you did not dx her with anything and she does not know why you did not figure what is going on with her and give her a dx.

## 2022-10-10 ENCOUNTER — HOSPITAL ENCOUNTER (OUTPATIENT)
Dept: GENERAL RADIOLOGY | Facility: HOSPITAL | Age: 82
Discharge: HOME | End: 2022-10-10

## 2022-10-10 ENCOUNTER — OFFICE VISIT (OUTPATIENT)
Dept: GASTROENTEROLOGY | Facility: CLINIC | Age: 82
End: 2022-10-10

## 2022-10-10 VITALS
TEMPERATURE: 98 F | HEART RATE: 70 BPM | SYSTOLIC BLOOD PRESSURE: 112 MMHG | HEIGHT: 62 IN | BODY MASS INDEX: 19.67 KG/M2 | DIASTOLIC BLOOD PRESSURE: 62 MMHG | WEIGHT: 106.9 LBS | OXYGEN SATURATION: 99 %

## 2022-10-10 DIAGNOSIS — K57.90 DIVERTICULOSIS: ICD-10-CM

## 2022-10-10 DIAGNOSIS — K63.5 POLYP OF COLON, UNSPECIFIED PART OF COLON, UNSPECIFIED TYPE: ICD-10-CM

## 2022-10-10 DIAGNOSIS — K59.04 CHRONIC IDIOPATHIC CONSTIPATION: ICD-10-CM

## 2022-10-10 DIAGNOSIS — K59.09 OTHER CONSTIPATION: Primary | Chronic | ICD-10-CM

## 2022-10-10 DIAGNOSIS — R19.4 CHANGE IN STOOL HABITS: ICD-10-CM

## 2022-10-10 DIAGNOSIS — K59.09 OTHER CONSTIPATION: Chronic | ICD-10-CM

## 2022-10-10 PROCEDURE — 99204 OFFICE O/P NEW MOD 45 MIN: CPT | Performed by: INTERNAL MEDICINE

## 2022-10-10 PROCEDURE — 74018 RADEX ABDOMEN 1 VIEW: CPT

## 2022-10-10 RX ORDER — PLECANATIDE 3 MG/1
3 TABLET ORAL DAILY
Qty: 12 TABLET | Refills: 0 | COMMUNITY
Start: 2022-10-10 | End: 2022-11-14

## 2022-10-10 NOTE — PATIENT INSTRUCTIONS
Abdominal x-ray to assess how much stool is in the colon    Continue taking Miralax with over the counter magnesium citrate supplementation per bottle instructions to help to improve frequency of bowel movements     If excess stool is noted on abdominal x-ray and bowel movements do not improve then we will have you stop linzess and begin taking trulance once daily

## 2022-10-10 NOTE — PROGRESS NOTES
"Chief Complaint   Patient presents with   • Constipation   • Follow-up           History of Present Illness  82-year-old patient a previous patient of our old practice.  She recently saw her primary care physician September 2022 with complaints of constipation and nausea.  At that time she was given Linzess delayed 72 mcg and I believe due to cost and taking it for 2 days without benefit it has been discontinued.  Colon 4/2019  SCANNED - COLONOSCOPY (04/12/2019)  Polyp (not retrieved)  diverticulosis  SCANNED - COLONOSCOPY (04/19/2012)  Nl  Except diverticulosis      Reports concern of worsening constipation that was unresponsive to multiple over the counter treatments that include dulcolax and metamucil.  She was started on daily linzess that she initially started on 72 mcg taken at night time which she has since started taking in the am-provided samples and has been on for 7 days.     She also has begun taking nightly miralax for 3 days.  Describes bowel movements as small in size however improved compared to prior to starting regimen.  She states that continues to have to strain to successfully evacuate stool and experiences incomplete evacuation. Denies melena or hematochezia.     Prior to most recent episode she has tried miralax (over time began less efficacious), senakot, and dulcolax without experiencing regular bowel movements.       States that she has had a long-standing history of constipation alternating with periods of regular bowel movements.     She does report nausea without vomiting and abdominal bloating that worsens with increasing intervals between successful bowel movements.    Review of Systems     Result Review :           Vital Signs:   /62   Pulse 70   Temp 98 °F (36.7 °C)   Ht 157.5 cm (62\")   Wt 48.5 kg (106 lb 14.4 oz)   SpO2 99%   BMI 19.55 kg/m²     Body mass index is 19.55 kg/m².     Physical Exam  Vitals reviewed.   Constitutional:       Appearance: Normal appearance. "   Abdominal:      General: Bowel sounds are normal. There is distension.      Palpations: Abdomen is soft. Abdomen is not rigid. There is no mass or pulsatile mass.      Tenderness: There is no abdominal tenderness. There is no guarding or rebound.      Comments: Mild bloating           Assessment and Plan    Diagnoses and all orders for this visit:    1. Other constipation (Primary)  -     XR Abdomen KUB; Future    2. Polyp of colon, unspecified part of colon, unspecified type    3. Diverticulosis    4. Change in stool habits  -     XR Abdomen KUB; Future    5. Chronic idiopathic constipation       To consider Ct imaging if symptoms do not resolve with prescribed bowel regimen to assess for structural component to patient's symptoms.  If symptoms do not resolve with above regimen and stool burden is noted on abdominal x-ray will stop linzess and begin patient on trulance 3 mg once daily        I have reviewed and confirmed the accuracy of the HPI and Assessment and Plan as documented by the BRANDO Farris        Follow Up   No follow-ups on file.    Patient Instructions   Abdominal x-ray to assess how much stool is in the colon    Continue taking Miralax with over the counter magnesium citrate supplementation per bottle instructions to help to improve frequency of bowel movements     If excess stool is noted on abdominal x-ray and bowel movements do not improve then we will have you stop linzess and begin taking trulance once daily

## 2022-10-11 ENCOUNTER — TELEPHONE (OUTPATIENT)
Dept: GASTROENTEROLOGY | Facility: CLINIC | Age: 82
End: 2022-10-11

## 2022-10-11 NOTE — TELEPHONE ENCOUNTER
Patient called in regards to an e-mail she received apparently about the medication Trulance. Patient requested a call back to answer some questions regarding how to start.

## 2022-10-11 NOTE — TELEPHONE ENCOUNTER
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  Per patient, her insurance will not cover Trulance at all.  Wanted alternative directions for constipation.  Per KP, patient should try Sennekot and Miralax, both of which were discussed at her office visit yesterday. 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  Patient was unable to find citrate of magnesia as it is back ordered everywhere.

## 2022-10-17 DIAGNOSIS — E03.8 OTHER SPECIFIED HYPOTHYROIDISM: ICD-10-CM

## 2022-10-17 RX ORDER — LEVOTHYROXINE SODIUM 0.07 MG/1
TABLET ORAL
Qty: 90 TABLET | Refills: 0 | Status: SHIPPED | OUTPATIENT
Start: 2022-10-17 | End: 2022-12-27

## 2022-11-14 ENCOUNTER — HOSPITAL ENCOUNTER (OUTPATIENT)
Dept: CARDIOLOGY | Facility: HOSPITAL | Age: 82
Discharge: HOME | End: 2022-11-14
Admitting: INTERNAL MEDICINE

## 2022-11-14 ENCOUNTER — OFFICE VISIT (OUTPATIENT)
Dept: INTERNAL MEDICINE | Facility: CLINIC | Age: 82
End: 2022-11-14

## 2022-11-14 ENCOUNTER — TELEPHONE (OUTPATIENT)
Dept: INTERNAL MEDICINE | Facility: CLINIC | Age: 82
End: 2022-11-14

## 2022-11-14 VITALS — TEMPERATURE: 97.1 F | WEIGHT: 107.6 LBS | HEIGHT: 62 IN | BODY MASS INDEX: 19.8 KG/M2

## 2022-11-14 DIAGNOSIS — M79.89 CALF SWELLING: Primary | ICD-10-CM

## 2022-11-14 DIAGNOSIS — M79.89 CALF SWELLING: ICD-10-CM

## 2022-11-14 LAB
BH CV LOWER VASCULAR LEFT COMMON FEMORAL AUGMENT: NORMAL
BH CV LOWER VASCULAR LEFT COMMON FEMORAL COMPETENT: NORMAL
BH CV LOWER VASCULAR LEFT COMMON FEMORAL COMPRESS: NORMAL
BH CV LOWER VASCULAR LEFT COMMON FEMORAL PHASIC: NORMAL
BH CV LOWER VASCULAR LEFT COMMON FEMORAL SPONT: NORMAL
BH CV LOWER VASCULAR LEFT DISTAL FEMORAL COMPRESS: NORMAL
BH CV LOWER VASCULAR LEFT GASTRONEMIUS COMPRESS: NORMAL
BH CV LOWER VASCULAR LEFT GREATER SAPH AK COMPRESS: NORMAL
BH CV LOWER VASCULAR LEFT GREATER SAPH BK COMPRESS: NORMAL
BH CV LOWER VASCULAR LEFT LESSER SAPH COMPRESS: NORMAL
BH CV LOWER VASCULAR LEFT MID FEMORAL AUGMENT: NORMAL
BH CV LOWER VASCULAR LEFT MID FEMORAL COMPETENT: NORMAL
BH CV LOWER VASCULAR LEFT MID FEMORAL COMPRESS: NORMAL
BH CV LOWER VASCULAR LEFT MID FEMORAL PHASIC: NORMAL
BH CV LOWER VASCULAR LEFT MID FEMORAL SPONT: NORMAL
BH CV LOWER VASCULAR LEFT PERONEAL COMPRESS: NORMAL
BH CV LOWER VASCULAR LEFT POPLITEAL AUGMENT: NORMAL
BH CV LOWER VASCULAR LEFT POPLITEAL COMPETENT: NORMAL
BH CV LOWER VASCULAR LEFT POPLITEAL COMPRESS: NORMAL
BH CV LOWER VASCULAR LEFT POPLITEAL PHASIC: NORMAL
BH CV LOWER VASCULAR LEFT POPLITEAL SPONT: NORMAL
BH CV LOWER VASCULAR LEFT POSTERIOR TIBIAL COMPRESS: NORMAL
BH CV LOWER VASCULAR LEFT PROFUNDA FEMORAL COMPRESS: NORMAL
BH CV LOWER VASCULAR LEFT PROXIMAL FEMORAL COMPRESS: NORMAL
BH CV LOWER VASCULAR LEFT SAPHENOFEMORAL JUNCTION COMPRESS: NORMAL
BH CV LOWER VASCULAR RIGHT COMMON FEMORAL AUGMENT: NORMAL
BH CV LOWER VASCULAR RIGHT COMMON FEMORAL COMPETENT: NORMAL
BH CV LOWER VASCULAR RIGHT COMMON FEMORAL COMPRESS: NORMAL
BH CV LOWER VASCULAR RIGHT COMMON FEMORAL PHASIC: NORMAL
BH CV LOWER VASCULAR RIGHT COMMON FEMORAL SPONT: NORMAL
MAXIMAL PREDICTED HEART RATE: 137 BPM
STRESS TARGET HR: 116 BPM

## 2022-11-14 PROCEDURE — 93971 EXTREMITY STUDY: CPT

## 2022-11-14 PROCEDURE — 99213 OFFICE O/P EST LOW 20 MIN: CPT | Performed by: INTERNAL MEDICINE

## 2022-11-14 RX ORDER — SENNA LEAF EXTRACT 176MG/5ML
SYRUP ORAL DAILY
COMMUNITY
End: 2022-12-27

## 2022-11-14 NOTE — PROGRESS NOTES
Subjective        Chief Complaint   Patient presents with   • Foot Swelling     Left foot           Susana Hammonds is a 82 y.o. female who presents for    Patient Active Problem List   Diagnosis   • Other hyperlipidemia   • Other specified hypothyroidism   • Vitamin D deficiency   • Chronic midline low back pain without sciatica   • Non-rheumatic mitral regurgitation   • Neck pain   • Other constipation       History of Present Illness     She has had some swelling in her left foot that started on Friday. She wears compression hose except at night. The foot has been red and splotchy. She has not injured the leg. It does feel tingly.  Allergies   Allergen Reactions   • Fentanyl Nausea And Vomiting       Current Outpatient Medications on File Prior to Visit   Medication Sig Dispense Refill   • atorvastatin (LIPITOR) 20 MG tablet Take 1 tablet by mouth once daily 90 tablet 3   • HYDROcodone-acetaminophen (NORCO) 5-325 MG per tablet Take 1 tablet by mouth Every 12 (Twelve) Hours. 46 tablet 0   • latanoprost (XALATAN) 0.005 % ophthalmic solution 1 drop Every Night.     • levothyroxine (SYNTHROID, LEVOTHROID) 50 MCG tablet Take 1 tablet by mouth once daily 30 tablet 11   • levothyroxine (SYNTHROID, LEVOTHROID) 75 MCG tablet Take 1 tablet by mouth once daily 90 tablet 0   • Plecanatide (Trulance) 3 MG tablet Take 1 tablet by mouth Daily. 12 tablet 0   • Polyethylene Glycol 3350 (MIRALAX PO) Take  by mouth Daily.       No current facility-administered medications on file prior to visit.       Past Medical History:   Diagnosis Date   • Adenomatous colon polyp    • Adnexal cyst    • Allergic    • Colitis    • Depression    • Diverticulitis    • Glaucoma    • Low back pain    • Mitral valve insufficiency     mild in 2012   • Osteopenia    • Sleep disturbance    • Vertigo        Past Surgical History:   Procedure Laterality Date   • BACK SURGERY     • BREAST CYST ASPIRATION     • CATARACT EXTRACTION     • COLONOSCOPY   "2019    dr ortiz   • ENDOMETRIAL BIOPSY  2019   • EYE SURGERY  2015   • INGUINAL HERNIA REPAIR     • LAMINECTOMY     • TONSILLECTOMY         Family History   Problem Relation Age of Onset   • Asthma Mother    • Pancreatic cancer Mother    • Heart disease Mother    • Heart disease Father    • Melanoma Son    • Other Brother         back surgery   • Breast cancer Neg Hx    • Ovarian cancer Neg Hx        Social History     Socioeconomic History   • Marital status:    Tobacco Use   • Smoking status: Former     Packs/day: 0.50     Years: 20.00     Pack years: 10.00     Types: Cigarettes     Quit date: 1989     Years since quittin.5   • Smokeless tobacco: Never   Vaping Use   • Vaping Use: Never used   Substance and Sexual Activity   • Alcohol use: Yes   • Drug use: No   • Sexual activity: Defer           The following portions of the patient's history were reviewed and updated as appropriate: problem list, allergies, current medications, past medical history, past family history, past social history and past surgical history.    Review of Systems    Immunization History   Administered Date(s) Administered   • COVID-19 (PFIZER) BIVALENT BOOSTER 12+YRS 10/25/2022   • COVID-19 (PFIZER) PURPLE CAP 2021, 2021, 2021   • Covid-19 (Pfizer) Gray Cap 2022   • Fluzone High Dose =>65 Years ([employer] ONLY) 10/11/2020   • Fluzone High-Dose 65+yrs 2021, 2022   • Hepatitis A 2018, 10/19/2018   • Pneumococcal Conjugate 13-Valent (PCV13) 2015   • Pneumococcal Polysaccharide (PPSV23) 2005, 2020   • Tdap 10/01/2014       Objective   Vitals:    22 1026   Temp: 97.1 °F (36.2 °C)   TempSrc: Temporal   Weight: 48.8 kg (107 lb 9.6 oz)   Height: 157.5 cm (62.01\")     Body mass index is 19.68 kg/m².  Physical Exam  Musculoskeletal:      Comments: 2+ edema left ankle. Some redness over vein over top of foot. Some purple discoloration of her toes. "   Neurological:      Mental Status: She is alert and oriented to person, place, and time.   Psychiatric:         Mood and Affect: Mood normal.         Procedures    Assessment & Plan   Diagnoses and all orders for this visit:    1. Calf swelling (Primary)  Comments:  Get doppler today. Elevate leg. Wear compression hose 24 hours per day. Use warm on top of the foot and ibuprofen.  Orders:  -     Duplex Venous Lower Extremity - Left CAR; Future                 No follow-ups on file.

## 2022-11-14 NOTE — TELEPHONE ENCOUNTER
Patient called complaining of blotchy swollen foot (left), patient is also complaining of tingling with red spots,  she was told to wear compression stockings, however it does not seem to be working, patient would like to be seen today, please call [phone].

## 2022-12-25 DIAGNOSIS — E03.8 OTHER SPECIFIED HYPOTHYROIDISM: ICD-10-CM

## 2022-12-27 ENCOUNTER — OFFICE VISIT (OUTPATIENT)
Dept: INTERNAL MEDICINE | Facility: CLINIC | Age: 82
End: 2022-12-27

## 2022-12-27 VITALS
SYSTOLIC BLOOD PRESSURE: 116 MMHG | WEIGHT: 111 LBS | BODY MASS INDEX: 20.43 KG/M2 | DIASTOLIC BLOOD PRESSURE: 76 MMHG | HEIGHT: 62 IN

## 2022-12-27 DIAGNOSIS — F41.9 ANXIETY: ICD-10-CM

## 2022-12-27 DIAGNOSIS — G89.29 CHRONIC MIDLINE LOW BACK PAIN WITHOUT SCIATICA: ICD-10-CM

## 2022-12-27 DIAGNOSIS — E03.8 OTHER SPECIFIED HYPOTHYROIDISM: ICD-10-CM

## 2022-12-27 DIAGNOSIS — M54.50 CHRONIC MIDLINE LOW BACK PAIN WITHOUT SCIATICA: ICD-10-CM

## 2022-12-27 DIAGNOSIS — E78.49 OTHER HYPERLIPIDEMIA: ICD-10-CM

## 2022-12-27 DIAGNOSIS — Z00.00 ENCOUNTER FOR SUBSEQUENT ANNUAL WELLNESS VISIT (AWV) IN MEDICARE PATIENT: Primary | ICD-10-CM

## 2022-12-27 PROCEDURE — 1170F FXNL STATUS ASSESSED: CPT | Performed by: INTERNAL MEDICINE

## 2022-12-27 PROCEDURE — 1160F RVW MEDS BY RX/DR IN RCRD: CPT | Performed by: INTERNAL MEDICINE

## 2022-12-27 PROCEDURE — G0439 PPPS, SUBSEQ VISIT: HCPCS | Performed by: INTERNAL MEDICINE

## 2022-12-27 PROCEDURE — 1159F MED LIST DOCD IN RCRD: CPT | Performed by: INTERNAL MEDICINE

## 2022-12-27 PROCEDURE — 99214 OFFICE O/P EST MOD 30 MIN: CPT | Performed by: INTERNAL MEDICINE

## 2022-12-27 RX ORDER — SENNA PLUS 8.6 MG/1
1 TABLET ORAL DAILY
COMMUNITY

## 2022-12-27 RX ORDER — LEVOTHYROXINE SODIUM 0.07 MG/1
TABLET ORAL
Qty: 90 TABLET | Refills: 0 | Status: SHIPPED | OUTPATIENT
Start: 2022-12-27

## 2022-12-27 RX ORDER — HYDROCODONE BITARTRATE AND ACETAMINOPHEN 5; 325 MG/1; MG/1
1 TABLET ORAL EVERY 12 HOURS
Qty: 60 TABLET | Refills: 0 | Status: SHIPPED | OUTPATIENT
Start: 2022-12-27 | End: 2023-01-24 | Stop reason: SDUPTHER

## 2022-12-27 NOTE — PROGRESS NOTES
The ABCs of the Annual Wellness Visit  Subsequent Medicare Wellness Visit    Subjective    Susana Hammonds is a 82 y.o. female who presents for a Subsequent Medicare Wellness Visit.  She saw Dr. Noel in the last two months and he did not have an answer to why she has left leg swelling. She does wear her compression hose regularly. She takes Norco prn and it does help her back. The burning in her right buttocks is better. Her constipation is doing well senna and miralax. She saw gyn for a right ovarian cyst.  The following portions of the patient's history were reviewed and   updated as appropriate: allergies, current medications, past family history, past medical history, past social history, past surgical history and problem list.    Compared to one year ago, the patient feels her physical   health is the same.    Compared to one year ago, the patient feels her mental   health is the same.    Recent Hospitalizations:  She was not admitted to the hospital during the last year.       Current Medical Providers:  Patient Care Team:  Sen Bishop MD as PCP - General (Internal Medicine)  Cy Gandara MD as Consulting Physician (Gastroenterology)    Outpatient Medications Prior to Visit   Medication Sig Dispense Refill   • atorvastatin (LIPITOR) 20 MG tablet Take 1 tablet by mouth once daily 90 tablet 3   • latanoprost (XALATAN) 0.005 % ophthalmic solution 1 drop Every Night.     • levothyroxine (SYNTHROID, LEVOTHROID) 50 MCG tablet Take 1 tablet by mouth once daily 30 tablet 11   • levothyroxine (SYNTHROID, LEVOTHROID) 75 MCG tablet Take 1 tablet by mouth once daily 90 tablet 0   • Polyethylene Glycol 3350 (MIRALAX PO) Take  by mouth Daily.     • senna (SENOKOT) 8.6 MG tablet Take 1 tablet by mouth Daily.     • HYDROcodone-acetaminophen (NORCO) 5-325 MG per tablet Take 1 tablet by mouth Every 12 (Twelve) Hours. 46 tablet 0   • senna 176 MG/5ML syrup syrup Take  by mouth Daily.       No facility-administered  "medications prior to visit.       Opioid medication/s are on active medication list.  and I have evaluated her active treatment plan and pain score trends (see table).  There were no vitals filed for this visit.  I have reviewed the chart for potential of high risk medication and harmful drug interactions in the elderly.            Aspirin is not on active medication list.  Aspirin use is not indicated based on review of current medical condition/s. Risk of harm outweighs potential benefits.  .    Patient Active Problem List   Diagnosis   • Other hyperlipidemia   • Other specified hypothyroidism   • Vitamin D deficiency   • Chronic midline low back pain without sciatica   • Non-rheumatic mitral regurgitation   • Neck pain   • Other constipation   • Calf swelling     Advance Care Planning  Advance Directive is not on file.  ACP discussion was held with the patient during this visit. Patient has an advance directive (not in EMR), copy requested.     Objective    Vitals:    22 1056   BP: 116/76   Weight: 50.3 kg (111 lb)   Height: 157.5 cm (62.01\")     Estimated body mass index is 20.3 kg/m² as calculated from the following:    Height as of this encounter: 157.5 cm (62.01\").    Weight as of this encounter: 50.3 kg (111 lb).    BMI is within normal parameters. No other follow-up for BMI required.      Does the patient have evidence of cognitive impairment? No          HEALTH RISK ASSESSMENT    Smoking Status:  Social History     Tobacco Use   Smoking Status Former   • Packs/day: 0.50   • Years: 20.00   • Pack years: 10.00   • Types: Cigarettes   • Quit date: 1989   • Years since quittin.6   Smokeless Tobacco Never     Alcohol Consumption:  Social History     Substance and Sexual Activity   Alcohol Use Yes     Fall Risk Screen:    STEADI Fall Risk Assessment was completed, and patient is at LOW risk for falls.Assessment completed on:2022    Depression Screening:  PHQ-2/PHQ-9 Depression Screening " 12/27/2022   Little Interest or Pleasure in Doing Things 0-->not at all   Feeling Down, Depressed or Hopeless 0-->not at all   Trouble Falling or Staying Asleep, or Sleeping Too Much -   Feeling Tired or Having Little Energy -   Poor Appetite or Overeating -   Feeling Bad about Yourself - or that You are a Failure or Have Let Yourself or Your Family Down -   Trouble Concentrating on Things, Such as Reading the Newspaper or Watching Television -   Moving or Speaking So Slowly that Other People Could Have Noticed? Or the Opposite - Being So Fidgety -   Thoughts that You Would be Better Off Dead or of Hurting Yourself in Some Way -   PHQ-9: Brief Depression Severity Measure Score 0   If You Checked Off Any Problems, How Difficult Have These Problems Made It For You to Do Your Work, Take Care of Things at Home, or Get Along with Other People? -       Health Habits and Functional and Cognitive Screening:  Functional & Cognitive Status 12/27/2022   Do you have difficulty preparing food and eating? No   Do you have difficulty bathing yourself, getting dressed or grooming yourself? No   Do you have difficulty using the toilet? No   Do you have difficulty moving around from place to place? No   Do you have trouble with steps or getting out of a bed or a chair? No   Current Diet Well Balanced Diet   Dental Exam Up to date   Eye Exam Up to date   Exercise (times per week) 0 times per week   Current Exercises Include No Regular Exercise   Do you need help using the phone?  No   Are you deaf or do you have serious difficulty hearing?  No   Do you need help with transportation? No   Do you need help shopping? No   Do you need help preparing meals?  No   Do you need help with housework?  No   Do you need help with laundry? No   Do you need help taking your medications? No   Do you need help managing money? No   Do you ever drive or ride in a car without wearing a seat belt? No   Have you felt unusual stress, anger or loneliness in  the last month? No   Who do you live with? Alone   If you need help, do you have trouble finding someone available to you? No   Have you been bothered in the last four weeks by sexual problems? No   Do you have difficulty concentrating, remembering or making decisions? No       Age-appropriate Screening Schedule:  Refer to the list below for future screening recommendations based on patient's age, sex and/or medical conditions. Orders for these recommended tests are listed in the plan section. The patient has been provided with a written plan.    Health Maintenance   Topic Date Due   • ZOSTER VACCINE (1 of 2) Never done   • LIPID PANEL  06/10/2023   • DXA SCAN  12/03/2023   • TDAP/TD VACCINES (2 - Td or Tdap) 10/01/2024   • INFLUENZA VACCINE  Completed                CMS Preventative Services Quick Reference  Risk Factors Identified During Encounter  Immunizations Discussed/Encouraged: Shingrix  The above risks/problems have been discussed with the patient.  Pertinent information has been shared with the patient in the After Visit Summary.  An After Visit Summary and PPPS were made available to the patient.    Follow Up:   Next Medicare Wellness visit to be scheduled in 1 year.       Additional E&M Note during same encounter follows:  Patient has multiple medical problems which are significant and separately identifiable that require additional work above and beyond the Medicare Wellness Visit.      Chief Complaint  Medicare Wellness-subsequent    Subjective        HPI  Susana Hammonds is also being seen today for Medicare Wellness and hypothyroidism.  She saw Dr. Noel in the last two months and he did not have an answer to why she has left leg swelling. She does wear her compression hose regularly. She takes Norco prn and it does help her back. The burning in her right buttocks is better. Her constipation is doing well senna and miralax. She saw gyn for a right ovarian cyst. She does worry a lot but she does not  "want to take any meds after discussing potential side effects. She worries about her children and she thinks about her own mortality.         Objective   Vital Signs:  /76   Ht 157.5 cm (62.01\")   Wt 50.3 kg (111 lb)   BMI 20.30 kg/m²     Physical Exam  Vitals reviewed.   Constitutional:       Appearance: She is well-developed.   HENT:      Head: Normocephalic and atraumatic.   Neck:      Thyroid: No thyromegaly.      Vascular: No carotid bruit.   Cardiovascular:      Rate and Rhythm: Normal rate and regular rhythm.      Heart sounds: Normal heart sounds, S1 normal and S2 normal.   Pulmonary:      Effort: Pulmonary effort is normal.      Breath sounds: Normal breath sounds.   Lymphadenopathy:      Cervical: No cervical adenopathy.   Skin:     General: Skin is warm.   Neurological:      Mental Status: She is alert.   Psychiatric:         Behavior: Behavior normal.                         Assessment and Plan   Diagnoses and all orders for this visit:    1. Encounter for subsequent annual wellness visit (AWV) in Medicare patient (Primary)  -     Comprehensive Metabolic Panel; Future  -     Lipid Panel With / Chol / HDL Ratio; Future    2. Other specified hypothyroidism  -     TSH; Future    3. Other hyperlipidemia    4. Anxiety  Comments:  Declines med after discussing side effects. Encouraged socialization and seeing a counselor.    5. Chronic midline low back pain without sciatica  -     HYDROcodone-acetaminophen (NORCO) 5-325 MG per tablet; Take 1 tablet by mouth Every 12 (Twelve) Hours.  Dispense: 60 tablet; Refill: 0             Follow Up   Return in about 6 months (around 6/27/2023), or 30 minutes, for Lab Before FUP.  Patient was given instructions and counseling regarding her condition or for health maintenance advice. Please see specific information pulled into the AVS if appropriate.     YARED appropriate. Get note from Dr. Noel. Reviewed labs. Discussed Shingrix.    "

## 2023-01-17 ENCOUNTER — TRANSCRIBE ORDERS (OUTPATIENT)
Dept: ADMINISTRATIVE | Facility: HOSPITAL | Age: 83
End: 2023-01-17
Payer: MEDICARE

## 2023-01-17 DIAGNOSIS — Z12.31 SCREENING MAMMOGRAM FOR BREAST CANCER: Primary | ICD-10-CM

## 2023-01-24 ENCOUNTER — TELEPHONE (OUTPATIENT)
Dept: INTERNAL MEDICINE | Facility: CLINIC | Age: 83
End: 2023-01-24
Payer: MEDICARE

## 2023-01-24 DIAGNOSIS — G89.29 CHRONIC MIDLINE LOW BACK PAIN WITHOUT SCIATICA: ICD-10-CM

## 2023-01-24 DIAGNOSIS — M54.50 CHRONIC MIDLINE LOW BACK PAIN WITHOUT SCIATICA: ICD-10-CM

## 2023-01-24 RX ORDER — HYDROCODONE BITARTRATE AND ACETAMINOPHEN 5; 325 MG/1; MG/1
1 TABLET ORAL EVERY 12 HOURS
Qty: 60 TABLET | Refills: 0 | Status: SHIPPED | OUTPATIENT
Start: 2023-01-24

## 2023-01-24 NOTE — TELEPHONE ENCOUNTER
Patient called wanting to get another refill on her Hydrocodone because when she received her prescription she only got 14 pills of the 60, not sure why [facility] only gave her 14, however she is needing a refill, please call [phone].

## 2023-01-31 NOTE — TELEPHONE ENCOUNTER
. Photo Preface (Leave Blank If You Do Not Want): Photographs were obtained today. Detail Level: Zone

## 2023-03-06 ENCOUNTER — HOSPITAL ENCOUNTER (OUTPATIENT)
Dept: MAMMOGRAPHY | Facility: HOSPITAL | Age: 83
Discharge: HOME | End: 2023-03-06
Admitting: INTERNAL MEDICINE
Payer: MEDICARE

## 2023-03-06 DIAGNOSIS — Z12.31 SCREENING MAMMOGRAM FOR BREAST CANCER: ICD-10-CM

## 2023-03-06 PROCEDURE — 77067 SCR MAMMO BI INCL CAD: CPT

## 2023-03-06 PROCEDURE — 77063 BREAST TOMOSYNTHESIS BI: CPT

## 2023-04-24 RX ORDER — LEVOTHYROXINE SODIUM 0.05 MG/1
TABLET ORAL
Qty: 90 TABLET | Refills: 0 | Status: SHIPPED | OUTPATIENT
Start: 2023-04-24

## 2023-07-21 ENCOUNTER — TELEPHONE (OUTPATIENT)
Dept: INTERNAL MEDICINE | Facility: CLINIC | Age: 83
End: 2023-07-21

## 2023-07-21 NOTE — TELEPHONE ENCOUNTER
Caller: Susana Hammonds    Relationship to patient: Self    Best call back number:     Patient is needing: PATIENT IS CALLING STATING THAT YESTERDAY SHE HAD A [employer] MESSAGE STATING SHE HAD TEST RESULTS.   SHE STATES THE RESULTS ARE FROM HER APPT BACK IN JUNE, AND THAT SHE HAD ALREADY RECEIVED THIS RESULTS.  PATIENT WOULD LIKE A CALLBACK TO ADVISE WHY SHE IS RECEIVING THESE RESULT RESULTS AGAIN NOW.  PLEASE ADVISE.

## 2023-08-13 NOTE — PROGRESS NOTES
SURGERY  Susana Hammonds   1940 08/14/23    Chief Complaint: Hernia    HPI    Ms. Hammonds is a very nice 83 y.o. female who presented to Dr. Griffith because of concerns for her abdomen swelling out.  One of her significant concerns was for an acquaintance of hers that had ovarian cancer and was discounted when she had abdominal distention.  Dr. Griffith has been following her for ovarian cyst but has found nothing worrisome.  She did however find a left groin hernia.    The patient absolutely denies any symptoms whatsoever with her left groin hernia.  Again her main concern is swelling of her abdomen and swelling of her left lower leg.  She has seen multiple people about the leg swelling without definitive answers that she can come up with.    Of note she has already had aN umbilical, ventral, bilateral inguinal hernia repair in the past.    Past Medical History:   Diagnosis Date    Adenomatous colon polyp     Adnexal cyst     Allergic     Colitis     Depression     Diverticulitis     Glaucoma     Low back pain     Mitral valve insufficiency     mild in 2012    Osteopenia     Sleep disturbance     Vertigo      Past Surgical History:   Procedure Laterality Date    BACK SURGERY      x2    BREAST CYST ASPIRATION      CATARACT EXTRACTION      COLONOSCOPY  04/11/2019    dr ortiz    ENDOMETRIAL BIOPSY  03/07/2019    EYE SURGERY  02/2015    INGUINAL HERNIA REPAIR Bilateral     TONSILLECTOMY      UMBILICAL HERNIA REPAIR      VENTRAL HERNIA REPAIR       Family History   Problem Relation Age of Onset    Asthma Mother     Pancreatic cancer Mother     Heart disease Mother     Heart disease Father     Melanoma Son     Other Brother         back surgery    Breast cancer Neg Hx     Ovarian cancer Neg Hx      Social History     Socioeconomic History    Marital status:    Tobacco Use    Smoking status: Former     Packs/day: 0.50     Years: 20.00     Pack years: 10.00     Types: Cigarettes     Quit date: 4/25/1989     Years  "since quittin.3    Smokeless tobacco: Never   Vaping Use    Vaping Use: Never used   Substance and Sexual Activity    Alcohol use: Yes    Drug use: No    Sexual activity: Defer         Current Outpatient Medications:     atorvastatin (LIPITOR) 20 MG tablet, Take 1 tablet by mouth once daily, Disp: 90 tablet, Rfl: 0    Doxylamine Succinate, Sleep, (SLEEP AID PO), Take 1 tablet by mouth Every Night., Disp: , Rfl:     latanoprost (XALATAN) 0.005 % ophthalmic solution, 1 drop Every Night., Disp: , Rfl:     levothyroxine (SYNTHROID, LEVOTHROID) 50 MCG tablet, Take 1 tablet by mouth once daily, Disp: 90 tablet, Rfl: 2    levothyroxine (SYNTHROID, LEVOTHROID) 75 MCG tablet, Take 1 tablet by mouth once daily, Disp: 90 tablet, Rfl: 0    Polyethylene Glycol 3350 (MIRALAX PO), Take  by mouth Daily., Disp: , Rfl:     Allergies   Allergen Reactions    Fentanyl Nausea And Vomiting       PHYSICAL EXAM:    /78   Ht 157.5 cm (62\")   Wt 49.2 kg (108 lb 6.4 oz)   BMI 19.83 kg/mý   Body mass index is 19.83 kg/mý.    Constitutional: well developed, well nourished, appears healthy, stated age  ENMT: Hearing intact, neck without masses  CVS: RRR, no murmur  Respiratory: CTA, normal respiratory effort   Gastrointestinal: abdomen distended, softly rounded, abdominal hernia not present, thin abdominal wall, no obvious diastases  Genitourinary: inguinal hernia palpable on the left, uncertain if femoral or inguinal but suspected inguinal since lateral and reducible  Musculoskeletal: gait normal for age, muscle mass normal for age  Neurological: awake and alert, seems to have reasonable capacity for understanding for medical decision making  Psychiatric: appears to have reasonable judgement, pleasant    Radiographic/Lab Findings:         Left groin hernia    Reviewed: CT scan with patient    IMPRESSION:  Left groin hernia, suspected inguinal, reducible, completely asymptomatic  Thin abdominal wall secondary to aging without " hernia    PLAN:  Recommended that she wear spanks for the cosmetic appearance that she does not appreciate.  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  If in fact she has worsening of symptoms whatsoever she knows to return.  Pamphlet given to her to illustrate potential for incarceration.    Yasmeen Quintana MD  3:02 PM    In order to provide a more personal and interactive patient experience as well as improve efficiency, this note was started prior to the office visit, including review of past history and pertinent images, surgeries.

## 2023-08-14 ENCOUNTER — OFFICE VISIT (OUTPATIENT)
Dept: SURGERY | Facility: CLINIC | Age: 83
End: 2023-08-14
Payer: MEDICARE

## 2023-08-14 VITALS
HEIGHT: 62 IN | BODY MASS INDEX: 19.95 KG/M2 | DIASTOLIC BLOOD PRESSURE: 78 MMHG | SYSTOLIC BLOOD PRESSURE: 138 MMHG | WEIGHT: 108.4 LBS

## 2023-08-14 DIAGNOSIS — K40.90 LEFT GROIN HERNIA: Primary | ICD-10-CM

## 2023-08-14 PROCEDURE — 1160F RVW MEDS BY RX/DR IN RCRD: CPT | Performed by: SURGERY

## 2023-08-14 PROCEDURE — 1159F MED LIST DOCD IN RCRD: CPT | Performed by: SURGERY

## 2023-08-14 PROCEDURE — 99203 OFFICE O/P NEW LOW 30 MIN: CPT | Performed by: SURGERY

## 2023-08-21 DIAGNOSIS — E03.8 OTHER SPECIFIED HYPOTHYROIDISM: ICD-10-CM

## 2023-08-21 RX ORDER — LEVOTHYROXINE SODIUM 0.07 MG/1
TABLET ORAL
Qty: 90 TABLET | Refills: 3 | Status: SHIPPED | OUTPATIENT
Start: 2023-08-21

## 2023-09-20 RX ORDER — ATORVASTATIN CALCIUM 20 MG/1
TABLET, FILM COATED ORAL
Qty: 90 TABLET | Refills: 3 | Status: SHIPPED | OUTPATIENT
Start: 2023-09-20 | End: 2023-12-19

## 2023-09-26 ENCOUNTER — TELEPHONE (OUTPATIENT)
Dept: INTERNAL MEDICINE | Facility: CLINIC | Age: 83
End: 2023-09-26
Payer: MEDICARE

## 2023-09-26 DIAGNOSIS — G89.29 CHRONIC MIDLINE LOW BACK PAIN WITHOUT SCIATICA: Primary | ICD-10-CM

## 2023-09-26 DIAGNOSIS — M54.50 CHRONIC MIDLINE LOW BACK PAIN WITHOUT SCIATICA: Primary | ICD-10-CM

## 2023-09-26 RX ORDER — HYDROCODONE BITARTRATE AND ACETAMINOPHEN 5; 325 MG/1; MG/1
1 TABLET ORAL EVERY 12 HOURS PRN
Qty: 46 TABLET | Refills: 0 | Status: SHIPPED | OUTPATIENT
Start: 2023-09-26

## 2023-09-26 NOTE — TELEPHONE ENCOUNTER
HYDROcodone-acetaminophen (NORCO) 5-325 MG per tablet 46 tablet 0          Would like refill , pharmacy is [facility] store [id number]

## 2024-01-23 ENCOUNTER — OFFICE VISIT (OUTPATIENT)
Dept: INTERNAL MEDICINE | Facility: CLINIC | Age: 84
End: 2024-01-23
Payer: MEDICARE

## 2024-01-23 VITALS
SYSTOLIC BLOOD PRESSURE: 138 MMHG | HEIGHT: 61 IN | DIASTOLIC BLOOD PRESSURE: 88 MMHG | WEIGHT: 110.4 LBS | BODY MASS INDEX: 20.84 KG/M2

## 2024-01-23 DIAGNOSIS — E03.8 OTHER SPECIFIED HYPOTHYROIDISM: Chronic | ICD-10-CM

## 2024-01-23 DIAGNOSIS — E78.49 OTHER HYPERLIPIDEMIA: Chronic | ICD-10-CM

## 2024-01-23 DIAGNOSIS — Z12.31 ENCOUNTER FOR SCREENING MAMMOGRAM FOR MALIGNANT NEOPLASM OF BREAST: ICD-10-CM

## 2024-01-23 DIAGNOSIS — E83.52 HYPERCALCEMIA: ICD-10-CM

## 2024-01-23 DIAGNOSIS — R73.03 PRE-DIABETES: ICD-10-CM

## 2024-01-23 DIAGNOSIS — F32.A DEPRESSION, UNSPECIFIED DEPRESSION TYPE: ICD-10-CM

## 2024-01-23 DIAGNOSIS — Z00.00 ENCOUNTER FOR SUBSEQUENT ANNUAL WELLNESS VISIT (AWV) IN MEDICARE PATIENT: Primary | ICD-10-CM

## 2024-01-23 PROCEDURE — 1160F RVW MEDS BY RX/DR IN RCRD: CPT | Performed by: INTERNAL MEDICINE

## 2024-01-23 PROCEDURE — G0439 PPPS, SUBSEQ VISIT: HCPCS | Performed by: INTERNAL MEDICINE

## 2024-01-23 PROCEDURE — 99214 OFFICE O/P EST MOD 30 MIN: CPT | Performed by: INTERNAL MEDICINE

## 2024-01-23 PROCEDURE — 1159F MED LIST DOCD IN RCRD: CPT | Performed by: INTERNAL MEDICINE

## 2024-01-23 PROCEDURE — 1170F FXNL STATUS ASSESSED: CPT | Performed by: INTERNAL MEDICINE

## 2024-01-23 RX ORDER — ATORVASTATIN CALCIUM 20 MG/1
TABLET, FILM COATED ORAL
COMMUNITY
Start: 2024-01-18

## 2024-01-23 RX ORDER — BUPROPION HYDROCHLORIDE 150 MG/1
150 TABLET ORAL DAILY
Qty: 30 TABLET | Refills: 3 | Status: SHIPPED | OUTPATIENT
Start: 2024-01-23

## 2024-01-23 NOTE — PROGRESS NOTES
The ABCs of the Annual Wellness Visit  Subsequent Medicare Wellness Visit    Subjective    Susana Hammonds is a 83 y.o. female who presents for a Subsequent Medicare Wellness Visit.  Her brother moved in with her last May. She is not exercising. She sleeps well. She saw Dr. Quintana to be checked for a hernia. She was told to wear spanx. She has a BM daily. She was told by her gyn that she does not have ovarian CA. She has days where she is depressed. She does play cards once per week. Denies SI.  The following portions of the patient's history were reviewed and   updated as appropriate: allergies, current medications, past family history, past medical history, past social history, past surgical history, and problem list.    Compared to one year ago, the patient feels her physical   health is the same.    Compared to one year ago, the patient feels her mental   health is worse.    Recent Hospitalizations:  She was not admitted to the hospital during the last year.       Current Medical Providers:  Patient Care Team:  Sen Bishop MD as PCP - General (Internal Medicine)  Cy Gandara MD as Consulting Physician (Gastroenterology)    Outpatient Medications Prior to Visit   Medication Sig Dispense Refill    atorvastatin (LIPITOR) 20 MG tablet       Doxylamine Succinate, Sleep, (SLEEP AID PO) Take 1 tablet by mouth Every Night.      HYDROcodone-acetaminophen (Norco) 5-325 MG per tablet Take 1 tablet by mouth Every 12 (Twelve) Hours As Needed for Mild Pain. 46 tablet 0    latanoprost (XALATAN) 0.005 % ophthalmic solution 1 drop Every Night.      levothyroxine (SYNTHROID, LEVOTHROID) 50 MCG tablet Take 1 tablet by mouth once daily 90 tablet 2    levothyroxine (SYNTHROID, LEVOTHROID) 75 MCG tablet Take 1 tablet by mouth once daily 90 tablet 3    Polyethylene Glycol 3350 (MIRALAX PO) Take  by mouth Daily.      benzonatate (TESSALON) 100 MG capsule Take 1 capsule by mouth 3 (Three) Times a Day As Needed for Cough.  "(Patient not taking: Reported on 2024) 21 capsule 0     No facility-administered medications prior to visit.       Opioid medication/s are on active medication list.  and I have evaluated her active treatment plan and pain score trends (see table).  There were no vitals filed for this visit.  I have reviewed the chart for potential of high risk medication and harmful drug interactions in the elderly.          Aspirin is not on active medication list.  Aspirin use is not indicated based on review of current medical condition/s. Risk of harm outweighs potential benefits.  .    Patient Active Problem List   Diagnosis    Other hyperlipidemia    Other specified hypothyroidism    Vitamin D deficiency    Chronic midline low back pain without sciatica    Non-rheumatic mitral regurgitation    Neck pain    Other constipation    Calf swelling    Pre-diabetes     Advance Care Planning   Advance Care Planning     Advance Directive is not on file.  ACP discussion was held with the patient during this visit. Patient has an advance directive (not in EMR), copy requested.     Objective    Vitals:    24 0953   BP: 138/88   Weight: 50.1 kg (110 lb 6.4 oz)   Height: 154.9 cm (60.98\")     Estimated body mass index is 20.87 kg/m² as calculated from the following:    Height as of this encounter: 154.9 cm (60.98\").    Weight as of this encounter: 50.1 kg (110 lb 6.4 oz).    BMI is within normal parameters. No other follow-up for BMI required.      Does the patient have evidence of cognitive impairment? No    Lab Results   Component Value Date    HGBA1C 6.10 (H) 2024        HEALTH RISK ASSESSMENT    Smoking Status:  Social History     Tobacco Use   Smoking Status Former    Packs/day: 0.50    Years: 20.00    Additional pack years: 0.00    Total pack years: 10.00    Types: Cigarettes    Quit date: 1989    Years since quittin.7   Smokeless Tobacco Never     Alcohol Consumption:  Social History     Substance and " Sexual Activity   Alcohol Use Yes     Fall Risk Screen:    NORMA Fall Risk Assessment was completed, and patient is at LOW risk for falls.Assessment completed on:2024    Depression Screenin/23/2024    10:01 AM   PHQ-2/PHQ-9 Depression Screening   Little Interest or Pleasure in Doing Things 0-->not at all   Feeling Down, Depressed or Hopeless 0-->not at all   PHQ-9: Brief Depression Severity Measure Score 0       Health Habits and Functional and Cognitive Screenin/23/2024    10:01 AM   Functional & Cognitive Status   Do you have difficulty preparing food and eating? No   Do you have difficulty bathing yourself, getting dressed or grooming yourself? No   Do you have difficulty using the toilet? No   Do you have difficulty moving around from place to place? No   Do you have trouble with steps or getting out of a bed or a chair? No   Current Diet Low Fat Diet   Dental Exam Up to date   Eye Exam Up to date   Exercise (times per week) 0 times per week   Current Exercises Include Walking;No Regular Exercise   Do you need help using the phone?  No   Are you deaf or do you have serious difficulty hearing?  No   Do you need help to go to places out of walking distance? No   Do you need help shopping? No   Do you need help preparing meals?  No   Do you need help with housework?  No   Do you need help with laundry? No   Do you need help taking your medications? No   Do you need help managing money? No   Do you ever drive or ride in a car without wearing a seat belt? No   Have you felt unusual stress, anger or loneliness in the last month? No   Who do you live with? Sibling   If you need help, do you have trouble finding someone available to you? No   Have you been bothered in the last four weeks by sexual problems? No   Do you have difficulty concentrating, remembering or making decisions? No       Age-appropriate Screening Schedule:  Refer to the list below for future screening recommendations based on  "patient's age, sex and/or medical conditions. Orders for these recommended tests are listed in the plan section. The patient has been provided with a written plan.    Health Maintenance   Topic Date Due    ZOSTER VACCINE (2 of 2) 05/11/2023    COVID-19 Vaccine (6 - 2023-24 season) 09/01/2023    DXA SCAN  12/03/2023    LIPID PANEL  06/20/2024    TDAP/TD VACCINES (2 - Td or Tdap) 10/01/2024    ANNUAL WELLNESS VISIT  01/23/2025    INFLUENZA VACCINE  Completed    Pneumococcal Vaccine 65+  Completed                  CMS Preventative Services Quick Reference  Risk Factors Identified During Encounter  Immunizations Discussed/Encouraged: Shingrix  The above risks/problems have been discussed with the patient.  Pertinent information has been shared with the patient in the After Visit Summary.  An After Visit Summary and PPPS were made available to the patient.    Follow Up:   Next Medicare Wellness visit to be scheduled in 1 year.       Additional E&M Note during same encounter follows:  Patient has multiple medical problems which are significant and separately identifiable that require additional work above and beyond the Medicare Wellness Visit.      Chief Complaint  Medicare Wellness-subsequent    Subjective        HPI  Susana Hammonds is also being seen today for medicare wellness and hypothyroidism.  Her brother moved in with her last May. She is not exercising. She sleeps well. She saw Dr. Quintana to be checked for a hernia. She was told to wear spanx. She has a BM daily. She was told by her gyn that she does not have ovarian CA. She has days where she is depressed. She does play cards once per week. Denies SI.She has right buttocks pain like a hot poker that is worse with standing a lot; ibuprofen helps. She continues to have edema in her left leg. She saw Dr. Noel in the past for it.        Objective   Vital Signs:  /88   Ht 154.9 cm (60.98\")   Wt 50.1 kg (110 lb 6.4 oz)   BMI 20.87 kg/m²     Physical " Exam  Vitals reviewed.   Constitutional:       Appearance: She is well-developed.   HENT:      Head: Normocephalic and atraumatic.   Neck:      Thyroid: No thyromegaly.      Vascular: No carotid bruit.   Cardiovascular:      Rate and Rhythm: Normal rate and regular rhythm.      Heart sounds: S1 normal and S2 normal. Murmur heard.      Systolic murmur is present with a grade of 1/6.   Pulmonary:      Effort: Pulmonary effort is normal.      Breath sounds: Normal breath sounds.   Lymphadenopathy:      Cervical: No cervical adenopathy.   Skin:     General: Skin is warm.   Neurological:      Mental Status: She is alert.   Psychiatric:         Behavior: Behavior normal.                         Assessment and Plan   Diagnoses and all orders for this visit:    1. Encounter for subsequent annual wellness visit (AWV) in Medicare patient (Primary)    2. Other hyperlipidemia  -     Comprehensive Metabolic Panel; Future  -     Lipid Panel With / Chol / HDL Ratio; Future    3. Other specified hypothyroidism  Comments:  TSH at goal  Orders:  -     TSH; Future    4. Encounter for screening mammogram for malignant neoplasm of breast  -     Mammo Screening Bilateral With CAD; Future    5. Pre-diabetes  Comments:  Discussed decreasing carbs  Orders:  -     Hemoglobin A1c; Future    6. Hypercalcemia  Comments:  She does not use calcium or Tums.  Orders:  -     Calcium, Ionized; Future    7. Depression, unspecified depression type  -     buPROPion XL (Wellbutrin XL) 150 MG 24 hr tablet; Take 1 tablet by mouth Daily.  Dispense: 30 tablet; Refill: 3             Follow Up   Return in about 6 weeks (around 3/5/2024), or 30 minutes, for Lab Before FUP.  Patient was given instructions and counseling regarding her condition or for health maintenance advice. Please see specific information pulled into the AVS if appropriate.         Reviewed labs. She is pre diabetic now. Repeat calcium in follow up. Likely has depression as she ages and her  children no longer live in [place]; she would like to try wellbutrin which her brother takes.  Discussed getting out more. I recommend she get on a plane and go visit her kids. Get note from Dr. Noel.

## 2024-01-29 ENCOUNTER — TRANSCRIBE ORDERS (OUTPATIENT)
Dept: ADMINISTRATIVE | Facility: HOSPITAL | Age: 84
End: 2024-01-29
Payer: MEDICARE

## 2024-01-29 DIAGNOSIS — Z12.31 SCREENING MAMMOGRAM FOR HIGH-RISK PATIENT: Primary | ICD-10-CM

## 2024-02-16 NOTE — PROGRESS NOTES
TPC Drainage note    Right TPC drained 200cc fluid - stable appearance brown, not turbid  New dressing applied  Tolerated procedure well   Sutures in place, no hematoma, no purulence    His son was present and instructed on drainage technique  Left fluid in room for removal by chemotherapy nurse    Will drain again Sunday 2/18, call with questions or concerns in the interim.    Alfredo Barrios, DO     The ABCs of the Annual Wellness Visit  Subsequent Medicare Wellness Visit    Chief Complaint   Patient presents with   • Medicare Wellness-subsequent      Subjective    History of Present Illness:  Susana Hammonds is a 81 y.o. female who presents for a Subsequent Medicare Wellness Visit.  She was putting her seat belt on in her car and she felt a pull in her right biceps. It is worse with activity. Ibuprofen helps. She has a bowel movement every day. She takes dulcolax and it may help some. She could not get Linzess secondary to cost. She has tried Miralax. She has no pain or rectal bleeding.   The following portions of the patient's history were reviewed and   updated as appropriate: allergies, current medications, past family history, past medical history, past social history, past surgical history and problem list.    Compared to one year ago, the patient feels her physical   health is the same.    Compared to one year ago, the patient feels her mental   health is the same.    Recent Hospitalizations:  She was not admitted to the hospital during the last year.       Current Medical Providers:  Patient Care Team:  Sen Bishop MD as PCP - General  Sen Bishop MD as PCP - Family Medicine    Outpatient Medications Prior to Visit   Medication Sig Dispense Refill   • atorvastatin (LIPITOR) 20 MG tablet Take 1 tablet by mouth Daily. 90 tablet 3   • bisacodyl (DULCOLAX) 5 MG EC tablet Take 5 mg by mouth Daily As Needed.     • latanoprost (XALATAN) 0.005 % ophthalmic solution 1 drop Every Night.     • levothyroxine (SYNTHROID, LEVOTHROID) 50 MCG tablet Take 50 mcg by mouth Daily.     • levothyroxine (SYNTHROID, LEVOTHROID) 75 MCG tablet Take 1 tablet by mouth once daily 30 tablet 11   • Aspirin 81 MG capsule Take  by mouth.     • HYDROcodone-acetaminophen (NORCO) 5-325 MG per tablet Take 1 tablet by mouth Every 12 (Twelve) Hours. 46 tablet 0   • meloxicam (MOBIC) 7.5 MG tablet Take 1 tablet by mouth Daily.  "30 tablet 0     No facility-administered medications prior to visit.       Opioid medication/s are on active medication list.  and I have evaluated her active treatment plan and pain score trends (see table).  There were no vitals filed for this visit.  I have reviewed the chart for potential of high risk medication and harmful drug interactions in the elderly.            Aspirin is on active medication list. Aspirin use is not indicated based on review of current medical condition/s. Risk of harm outweighs potential benefits. Patient instructed to discontinue this medication.  .      Patient Active Problem List   Diagnosis   • Other hyperlipidemia   • Other specified hypothyroidism   • Vitamin D deficiency   • Chronic midline low back pain without sciatica   • Non-rheumatic mitral regurgitation   • Neck pain   • Other constipation     Advance Care Planning  Advance Directive is not on file.  ACP discussion was held with the patient during this visit. Patient has an advance directive (not in EMR), copy requested.          Objective    Vitals:    12/16/21 1101   BP: 132/82   Temp: 97.8 °F (36.6 °C)   Weight: 49.4 kg (109 lb)   Height: 157.5 cm (62.01\")     BMI Readings from Last 1 Encounters:   12/16/21 19.93 kg/m²   BMI is within normal parameters. No follow-up required.    Does the patient have evidence of cognitive impairment? No    Physical Exam  Vitals reviewed.   Constitutional:       Appearance: She is well-developed.   HENT:      Head: Normocephalic and atraumatic.   Neck:      Vascular: No carotid bruit.   Cardiovascular:      Rate and Rhythm: Normal rate and regular rhythm.      Heart sounds: Normal heart sounds, S1 normal and S2 normal.   Pulmonary:      Effort: Pulmonary effort is normal.      Breath sounds: Normal breath sounds.   Musculoskeletal:      Comments: Right shoulder FROM. Right biceps non tender.   Skin:     General: Skin is warm.   Neurological:      Mental Status: She is alert. "   Psychiatric:         Behavior: Behavior normal.                 HEALTH RISK ASSESSMENT    Smoking Status:  Social History     Tobacco Use   Smoking Status Former Smoker   • Packs/day: 0.50   • Years: 20.00   • Pack years: 10.00   • Types: Cigarettes   • Quit date: 1989   • Years since quittin.6   Smokeless Tobacco Never Used     Alcohol Consumption:  Social History     Substance and Sexual Activity   Alcohol Use Yes     Fall Risk Screen:    DORITAADI Fall Risk Assessment was completed, and patient is at LOW risk for falls.Assessment completed on:2021    Depression Screening:  PHQ-2/PHQ-9 Depression Screening 2021   Little interest or pleasure in doing things 0   Feeling down, depressed, or hopeless 0   Trouble falling or staying asleep, or sleeping too much -   Feeling tired or having little energy -   Poor appetite or overeating -   Feeling bad about yourself - or that you are a failure or have let yourself or your family down -   Trouble concentrating on things, such as reading the newspaper or watching television -   Moving or speaking so slowly that other people could have noticed. Or the opposite - being so fidgety or restless that you have been moving around a lot more than usual -   Thoughts that you would be better off dead, or of hurting yourself in some way -   Total Score 0       Health Habits and Functional and Cognitive Screening:  Functional & Cognitive Status 2021   Do you have difficulty preparing food and eating? No   Do you have difficulty bathing yourself, getting dressed or grooming yourself? No   Do you have difficulty using the toilet? No   Do you have difficulty moving around from place to place? No   Do you have trouble with steps or getting out of a bed or a chair? No   Current Diet Well Balanced Diet   Dental Exam Up to date   Eye Exam Up to date   Exercise (times per week) 0 times per week   Current Exercises Include No Regular Exercise   Do you need help using  the phone?  No   Are you deaf or do you have serious difficulty hearing?  No   Do you need help with transportation? No   Do you need help shopping? No   Do you need help preparing meals?  No   Do you need help with housework?  No   Do you need help with laundry? No   Do you need help taking your medications? No   Do you need help managing money? No   Do you ever drive or ride in a car without wearing a seat belt? No   Have you felt unusual stress, anger or loneliness in the last month? No   Who do you live with? Alone   If you need help, do you have trouble finding someone available to you? No   Have you been bothered in the last four weeks by sexual problems? No   Do you have difficulty concentrating, remembering or making decisions? No       Age-appropriate Screening Schedule:  Refer to the list below for future screening recommendations based on patient's age, sex and/or medical conditions. Orders for these recommended tests are listed in the plan section. The patient has been provided with a written plan.    Health Maintenance   Topic Date Due   • ZOSTER VACCINE (1 of 2) Never done   • LIPID PANEL  06/04/2022   • DXA SCAN  12/03/2023   • TDAP/TD VACCINES (2 - Td or Tdap) 10/01/2024   • INFLUENZA VACCINE  Completed              Assessment/Plan   CMS Preventative Services Quick Reference  Risk Factors Identified During Encounter  Immunizations Discussed/Encouraged (specific Immunizations; Influenza and Shingrix  The above risks/problems have been discussed with the patient.  Follow up actions/plans if indicated are seen below in the Assessment/Plan Section.  Pertinent information has been shared with the patient in the After Visit Summary.    Diagnoses and all orders for this visit:    1. Medicare annual wellness visit, subsequent (Primary)    2. Encounter for screening mammogram for malignant neoplasm of breast  -     Mammo Screening Bilateral With CAD; Future    3. Other specified hypothyroidism  Comments:  TSH  at goal.  Orders:  -     TSH; Future    4. Other hyperlipidemia  -     Comprehensive Metabolic Panel; Future  -     Lipid Panel With / Chol / HDL Ratio; Future    5. Other constipation  Comments:  Had cscope 2 years ago. Continue dulcolax. Declines Linzess    6. Biceps strain, right, initial encounter  Comments:  Declines PT    7. Chronic midline low back pain without sciatica  -     HYDROcodone-acetaminophen (NORCO) 5-325 MG per tablet; Take 1 tablet by mouth Every 12 (Twelve) Hours.  Dispense: 46 tablet; Refill: 0    Other orders  -     Fluzone High-Dose 65+yrs (6105-6666)        Follow Up:   Return in about 6 months (around 6/16/2022), or 30 minutes, for Lab Before FUP.     An After Visit Summary and PPPS were made available to the patient.               Stop asa. Flu shot today. Recc Shingrix. Reviewed DEXA and TSH. YARED is appropriate.

## 2024-03-11 ENCOUNTER — HOSPITAL ENCOUNTER (OUTPATIENT)
Dept: MAMMOGRAPHY | Facility: HOSPITAL | Age: 84
Discharge: HOME | End: 2024-03-11
Admitting: INTERNAL MEDICINE
Payer: MEDICARE

## 2024-03-11 DIAGNOSIS — Z12.31 SCREENING MAMMOGRAM FOR HIGH-RISK PATIENT: ICD-10-CM

## 2024-03-11 PROCEDURE — 77067 SCR MAMMO BI INCL CAD: CPT

## 2024-03-11 PROCEDURE — 77063 BREAST TOMOSYNTHESIS BI: CPT

## 2024-03-15 ENCOUNTER — OFFICE VISIT (OUTPATIENT)
Dept: INTERNAL MEDICINE | Facility: CLINIC | Age: 84
End: 2024-03-15
Payer: MEDICARE

## 2024-03-15 VITALS
DIASTOLIC BLOOD PRESSURE: 80 MMHG | WEIGHT: 110.4 LBS | HEIGHT: 61 IN | SYSTOLIC BLOOD PRESSURE: 106 MMHG | BODY MASS INDEX: 20.84 KG/M2

## 2024-03-15 DIAGNOSIS — M54.31 RIGHT SIDED SCIATICA: ICD-10-CM

## 2024-03-15 DIAGNOSIS — F33.0 MILD EPISODE OF RECURRENT MAJOR DEPRESSIVE DISORDER: Primary | ICD-10-CM

## 2024-03-15 PROBLEM — F32.0 CURRENT MILD EPISODE OF MAJOR DEPRESSIVE DISORDER: Status: ACTIVE | Noted: 2024-03-15

## 2024-03-15 PROCEDURE — 1160F RVW MEDS BY RX/DR IN RCRD: CPT | Performed by: INTERNAL MEDICINE

## 2024-03-15 PROCEDURE — 99213 OFFICE O/P EST LOW 20 MIN: CPT | Performed by: INTERNAL MEDICINE

## 2024-03-15 PROCEDURE — 1159F MED LIST DOCD IN RCRD: CPT | Performed by: INTERNAL MEDICINE

## 2024-03-15 NOTE — PROGRESS NOTES
Subjective        Chief Complaint   Patient presents with    Depression           Susana Hammonds is a 83 y.o. female who presents for    Patient Active Problem List   Diagnosis    Other hyperlipidemia    Other specified hypothyroidism    Vitamin D deficiency    Chronic midline low back pain without sciatica    Non-rheumatic mitral regurgitation    Neck pain    Other constipation    Calf swelling    Pre-diabetes    Current mild episode of major depressive disorder       History of Present Illness     She is taking wellbutrin. She thinks she is doing better emotionally. She has some burning on her right buttocks with tingling to right leg. It is different than her past back pain.     Allergies   Allergen Reactions    Fentanyl Nausea And Vomiting       Current Outpatient Medications on File Prior to Visit   Medication Sig Dispense Refill    atorvastatin (LIPITOR) 20 MG tablet       buPROPion XL (Wellbutrin XL) 150 MG 24 hr tablet Take 1 tablet by mouth Daily. 30 tablet 3    Doxylamine Succinate, Sleep, (SLEEP AID PO) Take 1 tablet by mouth Every Night.      HYDROcodone-acetaminophen (Norco) 5-325 MG per tablet Take 1 tablet by mouth Every 12 (Twelve) Hours As Needed for Mild Pain. 46 tablet 0    latanoprost (XALATAN) 0.005 % ophthalmic solution 1 drop Every Night.      levothyroxine (SYNTHROID, LEVOTHROID) 50 MCG tablet Take 1 tablet by mouth once daily 90 tablet 2    levothyroxine (SYNTHROID, LEVOTHROID) 75 MCG tablet Take 1 tablet by mouth once daily 90 tablet 3    Polyethylene Glycol 3350 (MIRALAX PO) Take  by mouth Daily.       No current facility-administered medications on file prior to visit.       Past Medical History:   Diagnosis Date    Adenomatous colon polyp     Adnexal cyst     Allergic     Colitis     Depression     Diverticulitis     Glaucoma     Low back pain     Mitral valve insufficiency     mild in 2012    Osteopenia     Sleep disturbance     Vertigo        Past Surgical History:   Procedure  Laterality Date    BACK SURGERY      x2    BREAST CYST ASPIRATION      CATARACT EXTRACTION      COLONOSCOPY  2019    dr ortiz    ENDOMETRIAL BIOPSY  2019    EYE SURGERY  2015    INGUINAL HERNIA REPAIR Bilateral     TONSILLECTOMY      UMBILICAL HERNIA REPAIR      VENTRAL HERNIA REPAIR         Family History   Problem Relation Age of Onset    Asthma Mother     Pancreatic cancer Mother     Heart disease Mother     Heart disease Father     Melanoma Son     Other Brother         back surgery    Breast cancer Neg Hx     Ovarian cancer Neg Hx        Social History     Socioeconomic History    Marital status:    Tobacco Use    Smoking status: Former     Current packs/day: 0.00     Average packs/day: 0.5 packs/day for 20.0 years (10.0 ttl pk-yrs)     Types: Cigarettes     Start date: 1969     Quit date: 1989     Years since quittin.9    Smokeless tobacco: Never   Vaping Use    Vaping status: Never Used   Substance and Sexual Activity    Alcohol use: Yes    Drug use: No    Sexual activity: Defer           The following portions of the patient's history were reviewed and updated as appropriate: problem list, allergies, current medications, past medical history, past family history, past social history, and past surgical history.    Review of Systems    Immunization History   Administered Date(s) Administered    COVID-19 (PFIZER) BIVALENT 12+YRS 10/25/2022    COVID-19 (PFIZER) Purple Cap Monovalent 2021, 2021, 2021    Covid-19 (Pfizer) Gray Cap Monovalent 2022    Fluzone High Dose =>65 Years ([employer] ONLY) 10/11/2020    Fluzone High-Dose 65+yrs 2021, 2022, 10/05/2023    Hepatitis A 2018, 10/19/2018    Pneumococcal Conjugate 13-Valent (PCV13) 2015    Pneumococcal Polysaccharide (PPSV23) 2005, 2020    Shingrix 2023    Tdap 10/01/2014       Objective   Vitals:    03/15/24 1118   BP: 106/80   Weight: 50.1 kg (110 lb 6.4 oz)  "  Height: 154.9 cm (60.98\")     Body mass index is 20.87 kg/m².  Physical Exam  Vitals reviewed.   Constitutional:       Appearance: She is well-developed.   HENT:      Head: Normocephalic and atraumatic.   Cardiovascular:      Rate and Rhythm: Normal rate and regular rhythm.      Heart sounds: Normal heart sounds, S1 normal and S2 normal.   Pulmonary:      Effort: Pulmonary effort is normal.      Breath sounds: Normal breath sounds.   Skin:     General: Skin is warm.   Neurological:      Mental Status: She is alert.      Deep Tendon Reflexes:      Reflex Scores:       Patellar reflexes are 1+ on the right side and 1+ on the left side.       Achilles reflexes are 0 on the right side and 0 on the left side.     Comments: Negative SLR    5/5 strength    Psychiatric:         Behavior: Behavior normal.         Procedures    Assessment & Plan   Diagnoses and all orders for this visit:    1. Mild episode of recurrent major depressive disorder (Primary)  Comments:  Appears to have improved since starting wellbutrin.    2. Right sided sciatica  -     Ambulatory Referral to Physical Therapy Evaluate and treat          Ionized calcium nl. MMG nl. Doing better emotionally. Set up PT for right sided sciatica.         Return in about 5 months (around 8/15/2024), or 30 minutes, for Lab Before FUP.  "

## 2024-03-28 ENCOUNTER — TREATMENT (OUTPATIENT)
Dept: PHYSICAL THERAPY | Facility: CLINIC | Age: 84
End: 2024-03-28
Payer: MEDICARE

## 2024-03-28 DIAGNOSIS — R26.9 GAIT DISTURBANCE: ICD-10-CM

## 2024-03-28 DIAGNOSIS — M54.50 LOW BACK PAIN RADIATING TO RIGHT LOWER EXTREMITY: Primary | ICD-10-CM

## 2024-03-28 DIAGNOSIS — M79.604 LOW BACK PAIN RADIATING TO RIGHT LOWER EXTREMITY: Primary | ICD-10-CM

## 2024-03-28 DIAGNOSIS — M25.551 RIGHT HIP PAIN: ICD-10-CM

## 2024-03-28 DIAGNOSIS — R26.89 POOR BALANCE: ICD-10-CM

## 2024-03-28 PROCEDURE — 97140 MANUAL THERAPY 1/> REGIONS: CPT | Performed by: PHYSICAL THERAPIST

## 2024-03-28 PROCEDURE — 97110 THERAPEUTIC EXERCISES: CPT | Performed by: PHYSICAL THERAPIST

## 2024-03-28 PROCEDURE — 97035 APP MDLTY 1+ULTRASOUND EA 15: CPT | Performed by: PHYSICAL THERAPIST

## 2024-03-28 PROCEDURE — 97162 PT EVAL MOD COMPLEX 30 MIN: CPT | Performed by: PHYSICAL THERAPIST

## 2024-03-28 NOTE — PROGRESS NOTES
Physical Therapy Initial Evaluation and Plan of Care  [facility]  [address], [address]  [place] [address]    Patient: Susana Hammonds   : 1940  Diagnosis/ICD-10 Code:  Low back pain radiating to right lower extremity [M54.50, M79.604]  Referring practitioner: Sen Bishop MD  Today's Date: 3/29/2024    Subjective Evaluation    History of Present Illness  Mechanism of injury: Chronic lowe back for 20+ years.  Has undergone 2 lumbar surgeries (thinks that they were laminectomies - Dr Barrow did last one  2023 noticed a different type of pain - burning in the right buttocks - hot poker pain  Nothing like previous pain  Insidious onset  NO specific exercises  Walks her dog some days 20 minutes  Plays cards, goes out to eat, shops but must use the cart      Patient Occupation: Admin work - retired for 30 years Pain  Current pain ratin  At best pain ratin  At worst pain rating: 10  Location: right buttocks, radiates into the lower back, tingling into anteruoir thigh and lower leg (nerve pain)  Quality: burning, radiating, sharp and knife-like  Relieving factors: change in position and medications (get weight off of the right side, ibu and tylenol)  Aggravating factors: movement, squatting, standing and ambulation  Progression: improved    Social Support  Lives in: multiple-level home  Lives with: brother.    Diagnostic Tests  No diagnostic tests performed    Treatments  Previous treatment: medication  Current treatment: medication and physical therapy  Patient Goals  Patient goal: get rid of the pain           Objective        Special Questions      Additional Special Questions  Negative responses to lumbar special questions       Postural Observations  Seated posture: fair  Standing posture: fair    Additional Postural Observation Details  Seated with frequent position changes   Has well healed incision from previous back surgeries -  severe kyphoscoliosis in thoracic region, swollen left lower leg  Gait with some hesitation and 2 balance checks with 40' of walking    Palpation   Left   No palpable tenderness to the lumbar paraspinals.     Right   No palpable tenderness to the lumbar paraspinals.     Tenderness     Right Hip   Tenderness in the PSIS.     Neurological Testing     Sensation     Lumbar   Left   Intact: light touch    Right   Intact: light touch    Active Range of Motion   Cervical/Thoracic Spine     Thoracic   Flexion: 75 degrees   Extension: 25 degrees   Left lateral flexion: 50 degrees   Right lateral flexion: 50 degrees   Left rotation: 50 degrees   Right rotation: 50 degrees     Additional Active Range of Motion Details  Measurements are listed as percentages of expected motion, not degrees of motion   Motion limited not by pain but just mobility       Strength/Myotome Testing     Left Hip   Planes of Motion   Flexion: 4  Extension: 4+  Abduction: 4+    Right Hip   Planes of Motion   Flexion: 4  Extension: 4+  Abduction: 4+    Left Knee   Flexion: 4+  Extension: 4+    Right Knee   Flexion: 4+  Extension: 4+    Tests     Lumbar     Left   Positive quadrant.   Negative passive SLR.     Right   Positive quadrant.   Negative passive SLR.     Left Pelvic Girdle/Sacrum   Negative: sacral spring.     Right Pelvic Girdle/Sacrum   Negative: sacral spring.     Additional Tests Details  Positive Rhombergs test  Unable to do tandem stance or gait without assist          Assessment & Plan       Assessment  Impairments: abnormal gait, abnormal muscle tone, abnormal or restricted ROM, activity intolerance, impaired physical strength, lacks appropriate home exercise program, pain with function and weight-bearing intolerance   Functional limitations: carrying objects, lifting, sleeping, pulling, pushing, standing and stooping   Assessment details: 83 y.o. female with right lower back and hip pain of few months duration presents with: 1.  Intermittent right lower back and buttocks pain, 2. Decreased spinal AROM, 3. Kyphoscoliosis, 4. Abnormal gait with balance checks necessary, 5. Weakness in hip mm, 6. Tenderness in right piriformis muscle, sciatic notch region, 7. No true radicular symptoms, 8. Decreased tolerance for normal ADL's at home due to pain and poor balance  Prognosis: good    Goals  Plan Goals: Short Term Goals:  4 weeks  Patient will be able to tolerate initial exercises  Patient will have pain <3/10  Patient will be able to stand and walk for 10 minutes without increased symptoms   Patient will be able to transfer sit to stand without increased symptoms     Long Term Goals: 8 weeks  Patient will be independent in performing home exercise program.  Patient will have functional pain free spinal AROM  Patient will be able to stand and walk for 20 minutes without increased symptoms  Patient will be able to walk her dog without fear of loss of balance or falls      Plan  Therapy options: will be seen for skilled therapy services  Planned modality interventions: ultrasound  Planned therapy interventions: manual therapy, neuromuscular re-education, balance/weight-bearing training, strengthening, stretching, therapeutic activities, home exercise program and spinal/joint mobilization  Frequency: 2x week  Duration in visits: 16  Duration in weeks: 8  Treatment plan discussed with: patient  Plan details: Access Code: [email or web address]  URL: [email or web address]  Date: 03/28/2024  Prepared by: Erica Felder    Exercises  - Hooklying Single Knee to Chest Stretch  - 1 x daily - 7 x weekly - 1 sets - 3 reps - 20 sec hold  - Supine Piriformis Stretch with Foot on Ground  - 1 x daily - 7 x weekly - 1 sets - 3 reps - 20 sec hold  - Supine March  - 1 x daily - 7 x weekly - 1 sets - 10 reps - 3 seconds hold  - Hip Flexor Stretch at Edge of Bed  - 1 x daily - 7 x weekly - 1 sets - 3 reps - 20 seconds hold        Manual Therapy:    12     mins   48425;  Therapeutic Exercise:    15     mins  05745;     Neuromuscular James:    0    mins  15512;    Therapeutic Activity:     5     mins  01678;     Ultrasound                  __8_  mins  85237  Iontophoresis                 0    mins  60432    Electrical Stimulation     0    mins  35176 ([id number])  Traction                         _0  mins  39794     Evaluation Time:     20  mins  Timed Treatment:   40   mins   Total Treatment:     65   mins    PT: Erica Felder PT     License Number: [place] [id number]  Electronically signed by Erica Felder PT, 03/28/24, 2:12 PM EDT    Certification Period: 3/29/2024 thru 6/26/2024  I certify that the therapy services are furnished while this patient is under my care.  The services outlined above are required by this patient, and will be reviewed every 90 days.         Physician Signature:__________________________________________________    PHYSICIAN: Sen Bishop MD      DATE:     Please sign and return via fax to [email or web address] . Thank you, [facility].    PT SIGNATURE: Erica Felder PT   [place] LICENSE:  [id number]

## 2024-03-31 NOTE — PROGRESS NOTES
Physical Therapy Daily Treatment Note    Visit Diagnoses:  No diagnosis found.    VISIT#: 2      Susana Hammonds reports: She has a burning sensation in her R buttocks and travels into the lower part of her back. She had a lot of pain yesterday so she did nt do her exercises.   Current Pain Level:    4/10; Worst:   10/10  Affected Area:  right buttocks, radiates into the lower back, tingling into anterior thigh and lower leg (nerve pain)   Quality of Pain: burning, radiating, sharp and knife-like   Functional Deficits/Irritating Factors: movement, squatting, standing and ambulation, sitting in a wrong way.    Progression: no changes yet. Sore muscles from exercises  Compliance with HEP Reported: partial     Objective  Presents: antalgic gait pattern  Increased sets/reps of:  none   Increased resistance on:  none  Added to Program: fig 4 stretch, standing marching for balance     Tenderness to palpitation and pressure at R GT and down IT band to at least mid thigh. Noted some tenderness in lateral, distal buttocks and into GT and down to 1/3 thigh but not in piriformis today    See Exercise, Manual, and Modality Logs for complete treatment.     Patient Education: Pt was educated on exercise biomechanical correctness, intensity, and speed.     Assessment:  Susana had some soreness and increased pain after last PT session so she did not thinks she could do her HEP. She was improved today but still having moderate pain. She was quite tender especially around her GT, down the IT band about 1/3 of the way and some in the lateral distal buttocks. No tenderness in piriformis today. She was quite sensitive and had anterior thigh pain during R supine hip flexor stretch. She also found the piriformis stretch painful in her inguinal area so stopped that stretch. Seems to be highly irritable today.   Pt will continue to benefit from skilled PT interventions to address current functional deficits and impairments.       Plan:  Progress to/Continue with current program.       Timed:  Manual Therapy:            15_    mins  42882;  Ultrasound:                     10      mins  90212;   Therapeutic Exercise:    30     mins  59229;     Neuromuscular James:   0     mins  94706;    Therapeutic Activity:      0     mins  16026;      Iontophoresis              _0__   mins  Dry Needling               _0____   mins         Untimed:  Work Conditioning: __0__ mins 63469  Electrical Stimulation:    0    mins  91985 ( );  Mechanical Traction:       0        mins  35620;   Paraffin                       __0___  mins   Ice/Heat: __0__ mins      Timed Treatment:   55   mins   Total Treatment:     55   mins          ARA Marcus License # [id number]  Physical Therapist Assistant

## 2024-04-01 ENCOUNTER — TREATMENT (OUTPATIENT)
Dept: PHYSICAL THERAPY | Facility: CLINIC | Age: 84
End: 2024-04-01
Payer: MEDICARE

## 2024-04-04 NOTE — PROGRESS NOTES
Physical Therapy Daily Treatment Note    Visit Diagnoses:    ICD-10-CM ICD-9-CM   1. Low back pain radiating to right lower extremity  M54.50 724.2    M79.604    2. Right hip pain  M25.551 719.45   3. Gait disturbance  R26.9 781.2   4. Poor balance  R26.89 781.99       VISIT#: 3      Susana Hammonds reports: Yesterday she did her exercises and might have done more so this morning she had to take ibuprofen. Yesterday was an anxious day.  Functional Deficits/Irritating Factors: Current Pain Level:    5/10; Worst:   5/10  Affected Area:  right buttocks, radiates into the lower back, tingling into anterior thigh and lower leg (nerve pain)   Quality of Pain: burning, radiating, sharp and knife-like   Functional Deficits/Irritating Factors: movement, squatting, standing and ambulation, sitting in a wrong way.  Progression: improving overall but today is a bad day  Compliance with HEP Reported: Yes    Objective  Presents: antalgic gait pattern   Increased sets/reps of:  none   Increased resistance on:  marching  Added to Program: manual hip flexor and quad stretch        See Exercise, Manual, and Modality Logs for complete treatment.     Patient Education: Pt was educated on exercise biomechanical correctness, intensity, and speed.     Assessment:  Susana was very tender today in the piriformis and still around the GT and down her IT band to 1/3 of thigh. Did not note any piriformis tenderness last PT session but I may not have palpitated deep enough because it was very tender today. She is still finding the knee to  opposite shoulder piriformis stretch unbearable so deferred it. Susana finds it challenging to keep count of reps and sets so some exercises took longer than normal. Very tight in hip flexors and quads. Pt will continue to benefit from skilled PT interventions to address current functional deficits and impairments.       Plan: Progress to/Continue with current program.       Timed:  Manual Therapy:             20_    mins  96563;  Ultrasound:                     10      mins  43016;   Therapeutic Exercise:    25     mins  99277;     Neuromuscular James:   0     mins  49347;    Therapeutic Activity:      0     mins  91872;      Iontophoresis              _0__   mins  Dry Needling               _0____   mins         Untimed:  Work Conditioning: __0__ mins 70569  Electrical Stimulation:    0    mins  88840 ( );  Mechanical Traction:       0        mins  47798;   Paraffin                       __0___  mins   Ice/Heat: __0__ mins      Timed Treatment:   55   mins   Total Treatment:     55   mins          ARA Marcus License # [id number]  Physical Therapist Assistant

## 2024-04-05 ENCOUNTER — TREATMENT (OUTPATIENT)
Dept: PHYSICAL THERAPY | Facility: CLINIC | Age: 84
End: 2024-04-05
Payer: MEDICARE

## 2024-04-05 DIAGNOSIS — M54.50 LOW BACK PAIN RADIATING TO RIGHT LOWER EXTREMITY: Primary | ICD-10-CM

## 2024-04-05 DIAGNOSIS — R26.89 POOR BALANCE: ICD-10-CM

## 2024-04-05 DIAGNOSIS — M25.551 RIGHT HIP PAIN: ICD-10-CM

## 2024-04-05 DIAGNOSIS — R26.9 GAIT DISTURBANCE: ICD-10-CM

## 2024-04-05 DIAGNOSIS — M79.604 LOW BACK PAIN RADIATING TO RIGHT LOWER EXTREMITY: Primary | ICD-10-CM

## 2024-04-05 PROCEDURE — 97110 THERAPEUTIC EXERCISES: CPT | Performed by: PHYSICAL THERAPIST

## 2024-04-05 PROCEDURE — 97140 MANUAL THERAPY 1/> REGIONS: CPT | Performed by: PHYSICAL THERAPIST

## 2024-04-05 PROCEDURE — 97035 APP MDLTY 1+ULTRASOUND EA 15: CPT | Performed by: PHYSICAL THERAPIST

## 2024-04-12 ENCOUNTER — TREATMENT (OUTPATIENT)
Dept: PHYSICAL THERAPY | Facility: CLINIC | Age: 84
End: 2024-04-12
Payer: MEDICARE

## 2024-04-12 DIAGNOSIS — R26.89 POOR BALANCE: ICD-10-CM

## 2024-04-12 DIAGNOSIS — M79.604 LOW BACK PAIN RADIATING TO RIGHT LOWER EXTREMITY: Primary | ICD-10-CM

## 2024-04-12 DIAGNOSIS — M54.50 LOW BACK PAIN RADIATING TO RIGHT LOWER EXTREMITY: Primary | ICD-10-CM

## 2024-04-12 DIAGNOSIS — M25.551 RIGHT HIP PAIN: ICD-10-CM

## 2024-04-12 DIAGNOSIS — R26.9 GAIT DISTURBANCE: ICD-10-CM

## 2024-04-12 PROCEDURE — 97140 MANUAL THERAPY 1/> REGIONS: CPT | Performed by: PHYSICAL THERAPIST

## 2024-04-12 PROCEDURE — 97110 THERAPEUTIC EXERCISES: CPT | Performed by: PHYSICAL THERAPIST

## 2024-04-12 NOTE — PROGRESS NOTES
Physical Therapy Daily Treatment Note  [facility] Therapy [facility]  [address]  [place] [address]    Patient: Susana Hammonds   : 1940  Referring practitioner: Sen Bishop MD  Today's Date: 2024  Patient seen for 4 sessions    Visit Diagnoses:    ICD-10-CM ICD-9-CM   1. Low back pain radiating to right lower extremity  M54.50 724.2    M79.604    2. Right hip pain  M25.551 719.45   3. Gait disturbance  R26.9 781.2   4. Poor balance  R26.89 781.99       Subjective   Susana Hammonds reports: that her back and hip pain has decreased since the initiation of therapy. Has occasional right lower back pain to go along with the hip pain.  No leg pain.       Objective   Minimal tenderness in right piriformis today    See Exercise, Manual, and Modality Logs for complete treatment.     Patient Education: new clam shell exercise and addition of TBand to other exercises    Assessment/Plan  Susana has been very compliant with her HEP.  Still has difficulty with figure 4 exercise so it was deferred.  Needs to work on additional stabilization exericses.    Progress strengthening /stabilization /functional activity           Timed:  Manual Therapy:    12     mins  08041;  Therapeutic Exercise:    30     mins  51099;     Neuromuscular James:    0    mins  48923;    Therapeutic Activity:     0     mins  79387;     Ultrasound:      8     mins  60101;    Iontophoresis              __0_   mins  Dry Needling               _____   mins        Untimed:  Electrical Stimulation:     0    mins  71711 ( );  Mechanical Traction:             mins  61142;   Paraffin                       _____  mins     Timed Treatment:   50   mins   Total Treatment:     55   mins    Erica Felder PT  [place] License # [id number]  Physical Therapist    Electronically signed by Erica Felder PT, 24, 11:20 AM EDT

## 2024-04-16 ENCOUNTER — TREATMENT (OUTPATIENT)
Dept: PHYSICAL THERAPY | Facility: CLINIC | Age: 84
End: 2024-04-16
Payer: MEDICARE

## 2024-04-16 DIAGNOSIS — R26.89 POOR BALANCE: ICD-10-CM

## 2024-04-16 DIAGNOSIS — R26.9 GAIT DISTURBANCE: ICD-10-CM

## 2024-04-16 DIAGNOSIS — M54.50 LOW BACK PAIN RADIATING TO RIGHT LOWER EXTREMITY: Primary | ICD-10-CM

## 2024-04-16 DIAGNOSIS — M25.551 RIGHT HIP PAIN: ICD-10-CM

## 2024-04-16 DIAGNOSIS — M79.604 LOW BACK PAIN RADIATING TO RIGHT LOWER EXTREMITY: Primary | ICD-10-CM

## 2024-04-16 PROCEDURE — 97112 NEUROMUSCULAR REEDUCATION: CPT | Performed by: PHYSICAL THERAPIST

## 2024-04-16 PROCEDURE — 97530 THERAPEUTIC ACTIVITIES: CPT | Performed by: PHYSICAL THERAPIST

## 2024-04-16 PROCEDURE — 97110 THERAPEUTIC EXERCISES: CPT | Performed by: PHYSICAL THERAPIST

## 2024-04-16 NOTE — PROGRESS NOTES
Physical Therapy Daily Treatment Note  [facility] Therapy - [facility]  [address], [address]  [place] [address]    Patient: Susana Hammonds   : 1940  Referring practitioner: Sen Bishop MD  Today's Date: 2024  Patient seen for 5 sessions    Visit Diagnoses:    ICD-10-CM ICD-9-CM   1. Low back pain radiating to right lower extremity  M54.50 724.2    M79.604    2. Right hip pain  M25.551 719.45   3. Gait disturbance  R26.9 781.2   4. Poor balance  R26.89 781.99       Subjective   Susana Hammonds reports: that her pain is much less frequent but is still rather intense when she has it.  Pain still stays in the right buttocks and the small of her back.      Objective   Able to tandem walk with light hand hold assist    Walks with narrow base of support - nearly scissoring a bit    See Exercise, Manual, and Modality Logs for complete treatment.     Patient Education: cont HEP    Assessment/Plan  Worked quite a bit on balance work today as she feels like she is doing well with her hip flexibility exercises.  Had some self balance checks while walking but did not come close to falling.  Should respond to Wb exercises for strength and balance    Progress strengthening /stabilization /functional activity           Timed:  Manual Therapy:    10     mins  95202;  Therapeutic Exercise:    20/30     mins  07529;     Neuromuscular James:    10    mins  82835;    Therapeutic Activity:          mins  43182;     Ultrasound:      0/8     mins  16008;    Iontophoresis              __0_   mins  Dry Needling               _____   mins        Untimed:  Electrical Stimulation:     0    mins  54517 ( );  Mechanical Traction:             mins  32079;   Paraffin                       _____  mins     Timed Treatment:   40   mins   Total Treatment:     60   mins    Erica Felder PT  [place] License # [id number]  Physical Therapist    Electronically signed by Erica Felder PT, 24, 11:59 AM  EDT

## 2024-04-19 ENCOUNTER — TREATMENT (OUTPATIENT)
Dept: PHYSICAL THERAPY | Facility: CLINIC | Age: 84
End: 2024-04-19
Payer: MEDICARE

## 2024-04-19 DIAGNOSIS — M79.604 LOW BACK PAIN RADIATING TO RIGHT LOWER EXTREMITY: Primary | ICD-10-CM

## 2024-04-19 DIAGNOSIS — R26.89 POOR BALANCE: ICD-10-CM

## 2024-04-19 DIAGNOSIS — M54.50 LOW BACK PAIN RADIATING TO RIGHT LOWER EXTREMITY: Primary | ICD-10-CM

## 2024-04-19 DIAGNOSIS — M25.551 RIGHT HIP PAIN: ICD-10-CM

## 2024-04-19 DIAGNOSIS — R26.9 GAIT DISTURBANCE: ICD-10-CM

## 2024-04-19 PROCEDURE — 97110 THERAPEUTIC EXERCISES: CPT | Performed by: PHYSICAL THERAPIST

## 2024-04-19 PROCEDURE — 97140 MANUAL THERAPY 1/> REGIONS: CPT | Performed by: PHYSICAL THERAPIST

## 2024-04-19 PROCEDURE — 97035 APP MDLTY 1+ULTRASOUND EA 15: CPT | Performed by: PHYSICAL THERAPIST

## 2024-04-19 NOTE — PROGRESS NOTES
Physical Therapy Daily Treatment Note  [facility] Therapy [facility]  [address], [address]  [place] [address]    Patient: Susana Hammonds   : 1940  Referring practitioner: Sen Bishop MD  Today's Date: 2024  Patient seen for 6 sessions    Visit Diagnoses:    ICD-10-CM ICD-9-CM   1. Low back pain radiating to right lower extremity  M54.50 724.2    M79.604    2. Right hip pain  M25.551 719.45   3. Gait disturbance  R26.9 781.2   4. Poor balance  R26.89 781.99       Subjective   Susana Hammonds reports: that she had a burning sensation in the buttocks when she was driving out her today. Minimal to no pain in the leg.  Pain mostly present in the back with prolonged standing. States that the pain has decreased in the buttocks but still has the lower back pain.       Objective   Minimal tenderness in the right SI today but  in the piriformis    Very guarded with stretch of right hip flexors    Abbreviated exercise session as patient had time constraints     See Exercise, Manual, and Modality Logs for complete treatment.     Patient Education: cont HEP    Assessment/Plan  Patient with decreased hip pain but persistent lower back pain.  Tenderness to palpation has decreased.  Stenosis prevents standing fully erect for more than a few minutes at a time.    Progress strengthening /stabilization /functional activity           Timed:  Manual Therapy:    20     mins  20164;  Therapeutic Exercise:    10     mins  73607;     Neuromuscular James:    0    mins  66297;    Therapeutic Activity:     0     mins  06423;     Ultrasound:      8     mins  11221;    Iontophoresis              __0_   mins  Dry Needling               _____   mins        Untimed:  Electrical Stimulation:     0    mins  77992 ( );  Mechanical Traction:             mins  17216;   Paraffin                       _____  mins     Timed Treatment:   38   mins   Total Treatment:     45   mins    Erica  TASHA Felder PT  [place] License # [id number]  Physical Therapist    Electronically signed by Erica Felder, PT, 04/19/24, 11:16 AM EDT

## 2024-04-25 ENCOUNTER — TREATMENT (OUTPATIENT)
Dept: PHYSICAL THERAPY | Facility: CLINIC | Age: 84
End: 2024-04-25
Payer: MEDICARE

## 2024-04-25 DIAGNOSIS — R26.9 GAIT DISTURBANCE: ICD-10-CM

## 2024-04-25 DIAGNOSIS — M54.50 LOW BACK PAIN RADIATING TO RIGHT LOWER EXTREMITY: Primary | ICD-10-CM

## 2024-04-25 DIAGNOSIS — M25.551 RIGHT HIP PAIN: ICD-10-CM

## 2024-04-25 DIAGNOSIS — M79.604 LOW BACK PAIN RADIATING TO RIGHT LOWER EXTREMITY: Primary | ICD-10-CM

## 2024-04-25 DIAGNOSIS — R26.89 POOR BALANCE: ICD-10-CM

## 2024-04-25 PROCEDURE — 97110 THERAPEUTIC EXERCISES: CPT | Performed by: PHYSICAL THERAPIST

## 2024-04-25 PROCEDURE — 97112 NEUROMUSCULAR REEDUCATION: CPT | Performed by: PHYSICAL THERAPIST

## 2024-04-25 PROCEDURE — 97140 MANUAL THERAPY 1/> REGIONS: CPT | Performed by: PHYSICAL THERAPIST

## 2024-04-25 NOTE — PROGRESS NOTES
Physical Therapy Daily Treatment Note  [facility] Physical Therapy [facility]  [address], [address]  [place] [address]    Patient: Susana Hammonds   : 1940  Referring practitioner: Sen Bishop MD  Today's Date: 2024  Patient seen for 7 sessions    Visit Diagnoses:    ICD-10-CM ICD-9-CM   1. Low back pain radiating to right lower extremity  M54.50 724.2    M79.604    2. Right hip pain  M25.551 719.45   3. Gait disturbance  R26.9 781.2   4. Poor balance  R26.89 781.99       Subjective   Susana Hammonds reports: that the buttocks pain has decreased but is having some increase in lower back pain.  States that she does not feel good in general today.  Has had a lot of bad news this week from family members.      Objective   Tenderness in right LS junction    Very flexed patterning upon presentation today    See Exercise, Manual, and Modality Logs for complete treatment.     Patient Education: cont HEP    Assessment/Plan  Patient not feeling well today due to various reasons.  Is compliant with most of her HEP.  Balance is improving slowly as she was able to stand tandem on mat for short time without LOB.    Progress strengthening /stabilization /functional activity           Timed:  Manual Therapy:    10     mins  53043;  Therapeutic Exercise:    15/30     mins  87397;     Neuromuscular James:    15    mins  50046;    Therapeutic Activity:     0     mins  83927;     Ultrasound:      8     mins  47156;    Iontophoresis              __0_   mins  Dry Needling               _____   mins        Untimed:  Electrical Stimulation:     0    mins  37894 ( );  Mechanical Traction:             mins  94130;   Paraffin                       _____  mins     Timed Treatment:   43   mins   Total Treatment:     60   mins    Erica Felder PT  [place] License # [id number]  Physical Therapist    Electronically signed by Erica Felder PT, 24, 11:06 AM EDT

## 2024-05-02 NOTE — PROGRESS NOTES
Physical Therapy Daily Treatment Note    Visit Diagnoses:    ICD-10-CM ICD-9-CM   1. Low back pain radiating to right lower extremity  M54.50 724.2    M79.604    2. Right hip pain  M25.551 719.45   3. Gait disturbance  R26.9 781.2   4. Poor balance  R26.89 781.99       VISIT#: 8      Susana Hammonds reports: when she first wakes up she has pain and also when she sits in her kitchen chair after awhile she gets pain. In other chairs she does fine. Overall her pain has decreased. She still can't do a great deal of walking. She also has difficulty and increased symptoms with pivot turns and vacuuming. Her kids want her to come visit but she can't.    Functional Deficits/Irritating Factors: Current Pain Level:    5/10; Worst:   5/10  Affected Area:  right buttocks, radiates into the lower back,  Quality of Pain: burning, radiating, sharp and knife-like   Functional Deficits/Irritating Factors: movement, squatting, standing and ambulation, sitting in a wrong way. Vacuuming, turning  Progression: improving  Compliance with HEP Reported: Yes    Objective  Presents: forward flexed posture       Tender at L3/4 spinous process and L transverse process. Also in TFL  No tenderness in piriformis today    See Exercise, Manual, and Modality Logs for complete treatment.     Patient Education: Pt was educated on exercise biomechanical correctness, intensity, and speed.     Assessment:  Susana has made improvements towards her PT goals. She had no palpable tenderness in her piriformis today but some in her R TFL. She was also tender in her L3/4 spinous and transverse processes. She has increased pain with functional activities involving rotation such as walking, vacuuming and turning so found where she was tender on her Spinous Processes and worked on trunk rotation. She tolerated it well and will assess response next session. Pt will continue to benefit from skilled PT interventions to address current functional deficits and  impairments.       Plan: Progress to/Continue with current program.       Timed:  Manual Therapy:            15_    mins  32817;  Ultrasound:                     10      mins  91668;   Therapeutic Exercise:    15     mins  52873;     Neuromuscular James:   0     mins  93137;    Therapeutic Activity:      0     mins  12139;      Iontophoresis              _0__   mins  Dry Needling               _0____   mins         Untimed:  Work Conditioning: __0__ mins 59066  Electrical Stimulation:    0    mins  31403 ( );  Mechanical Traction:       0        mins  27905;   Paraffin                       __0___  mins   Ice/Heat: __0__ mins      Timed Treatment:   40   mins   Total Treatment:     40   mins          ARA Marcus License # [id number]  Physical Therapist Assistant

## 2024-05-03 ENCOUNTER — TREATMENT (OUTPATIENT)
Dept: PHYSICAL THERAPY | Facility: CLINIC | Age: 84
End: 2024-05-03
Payer: MEDICARE

## 2024-05-03 DIAGNOSIS — M25.551 RIGHT HIP PAIN: ICD-10-CM

## 2024-05-03 DIAGNOSIS — M79.604 LOW BACK PAIN RADIATING TO RIGHT LOWER EXTREMITY: Primary | ICD-10-CM

## 2024-05-03 DIAGNOSIS — R26.89 POOR BALANCE: ICD-10-CM

## 2024-05-03 DIAGNOSIS — R26.9 GAIT DISTURBANCE: ICD-10-CM

## 2024-05-03 DIAGNOSIS — M54.50 LOW BACK PAIN RADIATING TO RIGHT LOWER EXTREMITY: Primary | ICD-10-CM

## 2024-05-03 PROCEDURE — 97110 THERAPEUTIC EXERCISES: CPT | Performed by: PHYSICAL THERAPIST

## 2024-05-03 PROCEDURE — 97035 APP MDLTY 1+ULTRASOUND EA 15: CPT | Performed by: PHYSICAL THERAPIST

## 2024-05-03 PROCEDURE — 97140 MANUAL THERAPY 1/> REGIONS: CPT | Performed by: PHYSICAL THERAPIST

## 2024-05-07 ENCOUNTER — TREATMENT (OUTPATIENT)
Dept: PHYSICAL THERAPY | Facility: CLINIC | Age: 84
End: 2024-05-07
Payer: MEDICARE

## 2024-05-07 DIAGNOSIS — R26.89 POOR BALANCE: ICD-10-CM

## 2024-05-07 DIAGNOSIS — R26.9 GAIT DISTURBANCE: ICD-10-CM

## 2024-05-07 DIAGNOSIS — M79.604 LOW BACK PAIN RADIATING TO RIGHT LOWER EXTREMITY: Primary | ICD-10-CM

## 2024-05-07 DIAGNOSIS — M54.50 LOW BACK PAIN RADIATING TO RIGHT LOWER EXTREMITY: Primary | ICD-10-CM

## 2024-05-07 DIAGNOSIS — M25.551 RIGHT HIP PAIN: ICD-10-CM

## 2024-05-07 PROCEDURE — 97035 APP MDLTY 1+ULTRASOUND EA 15: CPT | Performed by: PHYSICAL THERAPIST

## 2024-05-07 PROCEDURE — 97140 MANUAL THERAPY 1/> REGIONS: CPT | Performed by: PHYSICAL THERAPIST

## 2024-05-17 ENCOUNTER — TREATMENT (OUTPATIENT)
Dept: PHYSICAL THERAPY | Facility: CLINIC | Age: 84
End: 2024-05-17
Payer: MEDICARE

## 2024-05-17 DIAGNOSIS — R26.89 POOR BALANCE: ICD-10-CM

## 2024-05-17 DIAGNOSIS — R26.9 GAIT DISTURBANCE: ICD-10-CM

## 2024-05-17 DIAGNOSIS — M54.50 LOW BACK PAIN RADIATING TO RIGHT LOWER EXTREMITY: Primary | ICD-10-CM

## 2024-05-17 DIAGNOSIS — M25.551 RIGHT HIP PAIN: ICD-10-CM

## 2024-05-17 DIAGNOSIS — M79.604 LOW BACK PAIN RADIATING TO RIGHT LOWER EXTREMITY: Primary | ICD-10-CM

## 2024-05-17 PROCEDURE — 97140 MANUAL THERAPY 1/> REGIONS: CPT | Performed by: PHYSICAL THERAPIST

## 2024-05-17 PROCEDURE — 97530 THERAPEUTIC ACTIVITIES: CPT | Performed by: PHYSICAL THERAPIST

## 2024-05-17 PROCEDURE — 97112 NEUROMUSCULAR REEDUCATION: CPT | Performed by: PHYSICAL THERAPIST

## 2024-05-17 NOTE — PROGRESS NOTES
Physical Therapy Daily Treatment Note - Reassessment  [facility] Physical Therapy [facility] [place]  [address], [address]  [place] [address]    Patient: Susana Hammonds   : 1940  Referring practitioner: Sen Bishop MD  Today's Date: 2024  Patient seen for 10 sessions    Visit Diagnoses:    ICD-10-CM ICD-9-CM   1. Low back pain radiating to right lower extremity  M54.50 724.2    M79.604    2. Right hip pain  M25.551 719.45   3. Gait disturbance  R26.9 781.2   4. Poor balance  R26.89 781.99       Subjective   Susana Hammonds reports: that her lower back feels some better but she still has the burning with prolonged sitting.  Still can not stand well without pain.  States that her right leg is swelling more lately.  Planning on calling her vascular surgeon.        Objective   Spinal AROM - flexion 100%, extension 50% without pain    Tenderness in right piriformis muscle     Unable to  tandem position without loss of balance    See Exercise, Manual, and Modality Logs for complete treatment.     Patient Education:natural decline in balance as we age, need to work on it to prevent further balance issues    Assessment/Plan  Patient has demonstrated minimal/moderate improvement since the initiation of therapy.  The back pain has  decreased but she does conitnue to have the deep buttocks pain.  The motion has increased.  The activity tolerances have remained limited due to persistent buttocks pain with any extended standing and walking. I feel that the patient would benefit from further diagnostic studies.  STG 1, 4 & LTG 1 met.  Progressing towards other goals.  If you have any questions concerning the care, please do not hesitate to call me.      Progress strengthening /stabilization /functional activity           Timed:  Manual Therapy:    15     mins  45605;  Therapeutic Exercise:    0     mins  76876;     Neuromuscular James:    10    mins  71924;    Therapeutic Activity:     10      mins  94316;     Ultrasound:      8     mins  71538;    Iontophoresis              __0_   mins  Dry Needling               _____   mins        Untimed:  Electrical Stimulation:     0    mins  70144 ( );  Mechanical Traction:             mins  36074;   Paraffin                       _____  mins     Timed Treatment:   43   mins   Total Treatment:     55   mins    Erica Felder PT  [place] License # [id number]  Physical Therapist    Electronically signed by Erica Felder PT, 05/17/24, 11:03 AM EDT

## 2024-05-17 NOTE — LETTER
Physical Therapy Daily Treatment Note - Reassessment  [facility] Physical Therapy [facility]  [address]  [place] [address]    Patient: Susana Hammonds   : 1940  Referring practitioner: Sen Bishop MD  Today's Date: 2024  Patient seen for 10 sessions    Visit Diagnoses:    ICD-10-CM ICD-9-CM   1. Low back pain radiating to right lower extremity  M54.50 724.2    M79.604    2. Right hip pain  M25.551 719.45   3. Gait disturbance  R26.9 781.2   4. Poor balance  R26.89 781.99       Subjective   Susana Hammonds reports: that her lower back feels some better but she still has the burning with prolonged sitting.  Still can not stand well without pain.  States that her right leg is swelling more lately.  Planning on calling her vascular surgeon.        Objective   Spinal AROM - flexion 100%, extension 50% without pain    Tenderness in right piriformis muscle     Unable to  tandem position without loss of balance    See Exercise, Manual, and Modality Logs for complete treatment.     Patient Education:natural decline in balance as we age, need to work on it to prevent further balance issues    Assessment/Plan  Patient has demonstrated minimal/moderate improvement since the initiation of therapy.  The back pain has  decreased but she does conitnue to have the deep buttocks pain.  The motion has increased.  The activity tolerances have remained limited due to persistent buttocks pain with any extended standing and walking. I feel that the patient would benefit from further diagnostic studies.  STG 1, 4 & LTG 1 met.  Progressing towards other goals.  If you have any questions concerning the care, please do not hesitate to call me.      Progress strengthening /stabilization /functional activity           Timed:  Manual Therapy:    15     mins  41920;  Therapeutic Exercise:    0     mins  91660;     Neuromuscular James:    10    mins  33215;    Therapeutic Activity:     10      mins  35010;     Ultrasound:      8     mins  03223;    Iontophoresis              __0_   mins  Dry Needling               _____   mins        Untimed:  Electrical Stimulation:     0    mins  08666 ( );  Mechanical Traction:             mins  23998;   Paraffin                       _____  mins     Timed Treatment:   43   mins   Total Treatment:     55   mins    Erica Felder PT  [place] License # [id number]  Physical Therapist    Electronically signed by Erica Felder PT, 05/17/24, 11:03 AM ROHINI Meza is still quite limited in standing and walking due to deep pain in the right hip.  She may benefit from further diagnostic studies to determine cause of the pain so that we may be able to treat her more effectively.  Thank you.

## 2024-05-20 ENCOUNTER — TELEPHONE (OUTPATIENT)
Dept: INTERNAL MEDICINE | Facility: CLINIC | Age: 84
End: 2024-05-20
Payer: MEDICARE

## 2024-05-20 NOTE — TELEPHONE ENCOUNTER
Caller: Susana Hammonds    Relationship: Self    Best call back number: [phone]    What is the medical concern/diagnosis: CHRONIC BACK PAIN     What specialty or service is being requested: REFERRAL FOR AN MRI     Any additional details:       PATIENT IN PHYSICAL THERAPY FOR THIS     PLEASE ADVISE

## 2024-05-21 ENCOUNTER — TREATMENT (OUTPATIENT)
Dept: PHYSICAL THERAPY | Facility: CLINIC | Age: 84
End: 2024-05-21
Payer: MEDICARE

## 2024-05-21 DIAGNOSIS — M79.604 LOW BACK PAIN RADIATING TO RIGHT LOWER EXTREMITY: Primary | ICD-10-CM

## 2024-05-21 DIAGNOSIS — R26.89 POOR BALANCE: ICD-10-CM

## 2024-05-21 DIAGNOSIS — M54.50 LOW BACK PAIN RADIATING TO RIGHT LOWER EXTREMITY: Primary | ICD-10-CM

## 2024-05-21 DIAGNOSIS — M25.551 RIGHT HIP PAIN: ICD-10-CM

## 2024-05-21 PROCEDURE — 97112 NEUROMUSCULAR REEDUCATION: CPT | Performed by: PHYSICAL THERAPIST

## 2024-05-21 PROCEDURE — 97140 MANUAL THERAPY 1/> REGIONS: CPT | Performed by: PHYSICAL THERAPIST

## 2024-05-21 PROCEDURE — 97110 THERAPEUTIC EXERCISES: CPT | Performed by: PHYSICAL THERAPIST

## 2024-05-23 ENCOUNTER — OFFICE VISIT (OUTPATIENT)
Dept: INTERNAL MEDICINE | Facility: CLINIC | Age: 84
End: 2024-05-23
Payer: MEDICARE

## 2024-05-23 VITALS — HEIGHT: 61 IN | TEMPERATURE: 92.6 F | WEIGHT: 109.8 LBS | BODY MASS INDEX: 20.73 KG/M2

## 2024-05-23 DIAGNOSIS — M54.50 CHRONIC MIDLINE LOW BACK PAIN WITHOUT SCIATICA: Primary | ICD-10-CM

## 2024-05-23 DIAGNOSIS — G89.29 CHRONIC MIDLINE LOW BACK PAIN WITHOUT SCIATICA: Primary | ICD-10-CM

## 2024-05-23 PROCEDURE — 1125F AMNT PAIN NOTED PAIN PRSNT: CPT | Performed by: INTERNAL MEDICINE

## 2024-05-23 PROCEDURE — 99213 OFFICE O/P EST LOW 20 MIN: CPT | Performed by: INTERNAL MEDICINE

## 2024-05-23 PROCEDURE — 1160F RVW MEDS BY RX/DR IN RCRD: CPT | Performed by: INTERNAL MEDICINE

## 2024-05-23 PROCEDURE — 1159F MED LIST DOCD IN RCRD: CPT | Performed by: INTERNAL MEDICINE

## 2024-05-23 NOTE — PROGRESS NOTES
Subjective        Chief Complaint   Patient presents with    Back Pain           Susana Hammonds is a 84 y.o. female who presents for    Patient Active Problem List   Diagnosis    Other hyperlipidemia    Other specified hypothyroidism    Vitamin D deficiency    Chronic midline low back pain without sciatica    Non-rheumatic mitral regurgitation    Neck pain    Other constipation    Calf swelling    Pre-diabetes    Current mild episode of major depressive disorder       History of Present Illness       Her back is 50 percent better with PT. She has not been able to walk a straight line for a long time. She denies incontinence. The pain does not radiate. She can get numbness in her right leg.     Her son had a biopsy of his prostate.  Allergies   Allergen Reactions    Fentanyl Nausea And Vomiting       Current Outpatient Medications on File Prior to Visit   Medication Sig Dispense Refill    atorvastatin (LIPITOR) 20 MG tablet       buPROPion XL (Wellbutrin XL) 150 MG 24 hr tablet Take 1 tablet by mouth Daily. 30 tablet 3    Doxylamine Succinate, Sleep, (SLEEP AID PO) Take 1 tablet by mouth Every Night.      HYDROcodone-acetaminophen (Norco) 5-325 MG per tablet Take 1 tablet by mouth Every 12 (Twelve) Hours As Needed for Mild Pain. 46 tablet 0    latanoprost (XALATAN) 0.005 % ophthalmic solution 1 drop Every Night.      levothyroxine (SYNTHROID, LEVOTHROID) 50 MCG tablet Take 1 tablet by mouth once daily 90 tablet 2    levothyroxine (SYNTHROID, LEVOTHROID) 75 MCG tablet Take 1 tablet by mouth once daily 90 tablet 3    Polyethylene Glycol 3350 (MIRALAX PO) Take  by mouth Daily.       No current facility-administered medications on file prior to visit.       Past Medical History:   Diagnosis Date    Adenomatous colon polyp     Adnexal cyst     Colitis     Depression     Diverticulitis     Injury of back 2014    Laminectomy/foraminotomy/discectomy    Low back pain     Mitral valve insufficiency     mild in 2012     Osteopenia     Vertigo        Past Surgical History:   Procedure Laterality Date    BACK SURGERY      x2    BREAST CYST ASPIRATION      CATARACT EXTRACTION      COLONOSCOPY  2019    dr ortiz    ENDOMETRIAL BIOPSY  2019    EYE SURGERY  2015    INGUINAL HERNIA REPAIR Bilateral     TONSILLECTOMY      UMBILICAL HERNIA REPAIR      VENTRAL HERNIA REPAIR         Family History   Problem Relation Age of Onset    Asthma Mother     Pancreatic cancer Mother     Heart disease Mother     Heart disease Father     Melanoma Son     Other Brother         back surgery    Breast cancer Neg Hx     Ovarian cancer Neg Hx        Social History     Socioeconomic History    Marital status:    Tobacco Use    Smoking status: Former     Current packs/day: 0.00     Average packs/day: 0.5 packs/day for 20.0 years (10.0 ttl pk-yrs)     Types: Cigarettes     Start date: 1969     Quit date: 1989     Years since quittin.1     Passive exposure: Past    Smokeless tobacco: Never   Vaping Use    Vaping status: Never Used   Substance and Sexual Activity    Alcohol use: Yes    Drug use: No    Sexual activity: Defer           The following portions of the patient's history were reviewed and updated as appropriate: problem list, allergies, current medications, past medical history, past family history, past social history, and past surgical history.    Review of Systems    Immunization History   Administered Date(s) Administered    COVID-19 (PFIZER) BIVALENT 12+YRS 10/25/2022    COVID-19 (PFIZER) Purple Cap Monovalent 2021, 2021, 2021    Covid-19 (Pfizer) Gray Cap Monovalent 2022    Fluzone High Dose =>65 Years (Vaxcare ONLY) 10/11/2020    Fluzone High-Dose 65+yrs 2021, 2022, 10/05/2023    Hepatitis A 2018, 10/19/2018    Pneumococcal Conjugate 13-Valent (PCV13) 2015    Pneumococcal Polysaccharide (PPSV23) 2005, 2020    Shingrix 2023    Tdap 10/01/2014  "      Objective   Vitals:    05/23/24 1515   Temp: 92.6 °F (33.7 °C)   TempSrc: Temporal   Weight: 49.8 kg (109 lb 12.8 oz)   Height: 154.9 cm (60.98\")     Body mass index is 20.76 kg/m².  Physical Exam  Neurological:      Deep Tendon Reflexes:      Reflex Scores:       Patellar reflexes are 2+ on the right side and 1+ on the left side.       Achilles reflexes are 0 on the right side and 0 on the left side.     Comments: 5/5 strength in her legs         Procedures    Assessment & Plan   Diagnoses and all orders for this visit:    1. Chronic midline low back pain without sciatica (Primary)        Discussed +/- of getting MRI of her back. Not sure it would change what we would do except for epidurals. She will think about and let me know in the next month. PT has helped.       I spent 20 minutes caring for Susana on this date of service. This time includes time spent by me in the following activities: obtaining and/or reviewing a separately obtained history and documenting information in the medical record     No follow-ups on file.  "

## 2024-05-30 NOTE — PROGRESS NOTES
Physical Therapy Daily Treatment Note    Visit Diagnoses:    ICD-10-CM ICD-9-CM   1. Low back pain radiating to right lower extremity  M54.50 724.2    M79.604    2. Right hip pain  M25.551 719.45   3. Poor balance  R26.89 781.99   4. Gait disturbance  R26.9 781.2       VISIT#: 12      Susana Hammonds reports: she has had a bad morning. The pain in her back has moved up to R S2/S3 area (she pointed to the painful area and stated it used to be lower). She is discouraged she is not making any recent progress but she has not been motivated to do her exercises. Not sure they are helping any longer. She is going to contact her physician about getting an MRI.   Current Pain Level:    5/10; Worst:   5/10  Affected Area:  right buttocks, radiates into the lower back, tingling into anterior thigh and lower leg (nerve pain)   Quality of Pain: burning, radiating, sharp and knife-like   Functional Deficits/Irritating Factors: movement, squatting, standing and ambulation, sitting in a wrong way. Vacuuming, turning  Progression: overall she has made progress but she is concerned because she does not know what is going on in her back.  Compliance with HEP Reported: partial to not much lately. She does not feel like they are helping like they were.     Objective  Presents: symmetrical gait pattern, short stride length   Increased sets/reps of:  time on hip flexor stretch   Increased resistance on:  none  Added to Program: Bridge with Isometric Abd/ER    No palpable tenderness in mid piriformis today but some  mild discomfort at insertion on GT and origin on Sacum.      Tender at S2/S3    See Exercise, Manual, and Modality Logs for complete treatment.     Patient Education: Pt was educated on exercise biomechanical correctness, intensity, and speed. Discussion about getting MRI and possible injections    Assessment:  Susana has been making progress with decreasing pain and some increased tolerance to more functional activities since  starting PT but not to the level she desires. She continues with some pain and mobility deficits. I think she may also benefit from discussion possible depression with her MD as she has had some family issues that are distressing to her and also the fact that she is getting older and as a result she perceives her body as declining more than she desires - affecting her positivity.  Balance also needs improvement. She lost her balance as she ambulated to the checkout and caught herself on the Leg press machine. Otherwise, she might have fallen. Pt will continue to benefit from skilled PT interventions to address current functional deficits and impairments.       Plan: Progress to/Continue with current program.       Timed:  Manual Therapy:            15_    mins  82244;  Ultrasound:                     0      mins  71445;   Therapeutic Exercise:    40     mins  34939;     Neuromuscular James:   0     mins  13859;    Therapeutic Activity:      0     mins  05531;      Iontophoresis              _0__   mins  Dry Needling               _0____   mins         Untimed:  Work Conditioning: __0__ mins 87776  Electrical Stimulation:    0    mins  23150 ( );  Mechanical Traction:       0        mins  14277;   Paraffin                       __0___  mins   Ice/Heat: __0__ mins      Timed Treatment:   55   mins   Total Treatment:     55   mins          ARA Marcus License # [id number]  Physical Therapist Assistant

## 2024-05-31 ENCOUNTER — TREATMENT (OUTPATIENT)
Dept: PHYSICAL THERAPY | Facility: CLINIC | Age: 84
End: 2024-05-31
Payer: MEDICARE

## 2024-05-31 DIAGNOSIS — M54.50 LOW BACK PAIN RADIATING TO RIGHT LOWER EXTREMITY: Primary | ICD-10-CM

## 2024-05-31 DIAGNOSIS — R26.89 POOR BALANCE: ICD-10-CM

## 2024-05-31 DIAGNOSIS — M25.551 RIGHT HIP PAIN: ICD-10-CM

## 2024-05-31 DIAGNOSIS — M79.604 LOW BACK PAIN RADIATING TO RIGHT LOWER EXTREMITY: Primary | ICD-10-CM

## 2024-05-31 DIAGNOSIS — R26.9 GAIT DISTURBANCE: ICD-10-CM

## 2024-05-31 PROCEDURE — 97140 MANUAL THERAPY 1/> REGIONS: CPT | Performed by: PHYSICAL THERAPIST

## 2024-05-31 PROCEDURE — 97110 THERAPEUTIC EXERCISES: CPT | Performed by: PHYSICAL THERAPIST

## 2024-06-04 ENCOUNTER — OFFICE VISIT (OUTPATIENT)
Dept: INTERNAL MEDICINE | Facility: CLINIC | Age: 84
End: 2024-06-04
Payer: MEDICARE

## 2024-06-04 VITALS — BODY MASS INDEX: 20.92 KG/M2 | HEIGHT: 61 IN | WEIGHT: 110.8 LBS

## 2024-06-04 DIAGNOSIS — G89.29 CHRONIC MIDLINE LOW BACK PAIN WITHOUT SCIATICA: Primary | ICD-10-CM

## 2024-06-04 DIAGNOSIS — R14.0 ABDOMINAL DISTENSION: ICD-10-CM

## 2024-06-04 DIAGNOSIS — R21 RASH OF FACE: ICD-10-CM

## 2024-06-04 DIAGNOSIS — M54.50 CHRONIC MIDLINE LOW BACK PAIN WITHOUT SCIATICA: Primary | ICD-10-CM

## 2024-06-04 PROCEDURE — 99214 OFFICE O/P EST MOD 30 MIN: CPT | Performed by: INTERNAL MEDICINE

## 2024-06-04 PROCEDURE — 1125F AMNT PAIN NOTED PAIN PRSNT: CPT | Performed by: INTERNAL MEDICINE

## 2024-06-04 PROCEDURE — 1160F RVW MEDS BY RX/DR IN RCRD: CPT | Performed by: INTERNAL MEDICINE

## 2024-06-04 PROCEDURE — 1159F MED LIST DOCD IN RCRD: CPT | Performed by: INTERNAL MEDICINE

## 2024-06-04 NOTE — PROGRESS NOTES
Subjective        Chief Complaint   Patient presents with    Back Pain           Susana Hammonds is a 84 y.o. female who presents for    Patient Active Problem List   Diagnosis    Other hyperlipidemia    Other specified hypothyroidism    Vitamin D deficiency    Chronic midline low back pain without sciatica    Non-rheumatic mitral regurgitation    Neck pain    Other constipation    Calf swelling    Pre-diabetes    Current mild episode of major depressive disorder       History of Present Illness       She developed a rash on her forehead two months ago. She has tried hydrocortisone cream and lotion. It can burn. She is able to peel the skin off in the am. She had not changed any soaps, detergents or make up. She had tried a new eye brow pencil but went back to her old one.     She feels like her abdomen is more distended. It does not hurt. She has a BM daily   Allergies   Allergen Reactions    Fentanyl Nausea And Vomiting       Current Outpatient Medications on File Prior to Visit   Medication Sig Dispense Refill    atorvastatin (LIPITOR) 20 MG tablet       buPROPion XL (Wellbutrin XL) 150 MG 24 hr tablet Take 1 tablet by mouth Daily. 30 tablet 3    Doxylamine Succinate, Sleep, (SLEEP AID PO) Take 1 tablet by mouth Every Night.      HYDROcodone-acetaminophen (Norco) 5-325 MG per tablet Take 1 tablet by mouth Every 12 (Twelve) Hours As Needed for Mild Pain. 46 tablet 0    latanoprost (XALATAN) 0.005 % ophthalmic solution 1 drop Every Night.      levothyroxine (SYNTHROID, LEVOTHROID) 50 MCG tablet Take 1 tablet by mouth once daily 90 tablet 2    levothyroxine (SYNTHROID, LEVOTHROID) 75 MCG tablet Take 1 tablet by mouth once daily 90 tablet 3    Polyethylene Glycol 3350 (MIRALAX PO) Take  by mouth Daily.       No current facility-administered medications on file prior to visit.       Past Medical History:   Diagnosis Date    Adenomatous colon polyp     Adnexal cyst     Colitis     Depression     Diverticulitis      Injury of back 2014    Laminectomy/foraminotomy/discectomy    Low back pain     Mitral valve insufficiency     mild in 2012    Osteopenia     Vertigo        Past Surgical History:   Procedure Laterality Date    BACK SURGERY      x2    BREAST CYST ASPIRATION      CATARACT EXTRACTION      COLONOSCOPY  2019    dr ortiz    ENDOMETRIAL BIOPSY  2019    EYE SURGERY  2015    INGUINAL HERNIA REPAIR Bilateral     TONSILLECTOMY      UMBILICAL HERNIA REPAIR      VENTRAL HERNIA REPAIR         Family History   Problem Relation Age of Onset    Asthma Mother     Pancreatic cancer Mother     Heart disease Mother     Heart disease Father     Melanoma Son     Other Brother         back surgery    Breast cancer Neg Hx     Ovarian cancer Neg Hx        Social History     Socioeconomic History    Marital status:    Tobacco Use    Smoking status: Former     Current packs/day: 0.00     Average packs/day: 0.5 packs/day for 20.0 years (10.0 ttl pk-yrs)     Types: Cigarettes     Start date: 1969     Quit date: 1989     Years since quittin.1     Passive exposure: Past    Smokeless tobacco: Never   Vaping Use    Vaping status: Never Used   Substance and Sexual Activity    Alcohol use: Yes    Drug use: No    Sexual activity: Defer           The following portions of the patient's history were reviewed and updated as appropriate: problem list, allergies, current medications, past medical history, past family history, past social history, and past surgical history.    Review of Systems    Immunization History   Administered Date(s) Administered    COVID-19 (PFIZER) BIVALENT 12+YRS 10/25/2022    COVID-19 (PFIZER) Purple Cap Monovalent 2021, 2021, 2021    Covid-19 (Pfizer) Gray Cap Monovalent 2022    Fluzone High Dose =>65 Years (Vaxcare ONLY) 10/11/2020    Fluzone High-Dose 65+yrs 2021, 2022, 10/05/2023    Hepatitis A 2018, 10/19/2018    Pneumococcal Conjugate 13-Valent  "(PCV13) 04/23/2015    Pneumococcal Polysaccharide (PPSV23) 01/01/2005, 07/01/2020    Shingrix 03/16/2023, 02/16/2024    Tdap 10/01/2014       Objective   Vitals:    06/04/24 1627   Weight: 50.3 kg (110 lb 12.8 oz)   Height: 154.9 cm (60.98\")     Body mass index is 20.95 kg/m².  Physical Exam  Abdominal:      Palpations: Abdomen is soft. There is no hepatomegaly or splenomegaly.      Tenderness: There is no abdominal tenderness.      Comments: Some abdominal prominence without fluid wave   Skin:     Comments: Redness on forehead that extends below right eye   Neurological:      Mental Status: She is alert.         Procedures    Assessment & Plan   Diagnoses and all orders for this visit:    1. Chronic midline low back pain without sciatica (Primary)  -     MRI Lumbar Spine Without Contrast; Future    2. Rash of face  -     Ambulatory Referral to Dermatology    3. Abdominal distension  -     US Abdomen Complete; Future    See last note for neuro exam for her back.               No follow-ups on file.  "

## 2024-06-05 ENCOUNTER — PATIENT MESSAGE (OUTPATIENT)
Dept: INTERNAL MEDICINE | Facility: CLINIC | Age: 84
End: 2024-06-05
Payer: MEDICARE

## 2024-06-11 ENCOUNTER — TREATMENT (OUTPATIENT)
Dept: PHYSICAL THERAPY | Facility: CLINIC | Age: 84
End: 2024-06-11
Payer: MEDICARE

## 2024-06-11 DIAGNOSIS — M54.50 LOW BACK PAIN RADIATING TO RIGHT LOWER EXTREMITY: Primary | ICD-10-CM

## 2024-06-11 DIAGNOSIS — M79.604 LOW BACK PAIN RADIATING TO RIGHT LOWER EXTREMITY: Primary | ICD-10-CM

## 2024-06-11 DIAGNOSIS — R26.9 GAIT DISTURBANCE: ICD-10-CM

## 2024-06-11 DIAGNOSIS — M25.551 RIGHT HIP PAIN: ICD-10-CM

## 2024-06-11 DIAGNOSIS — R26.89 POOR BALANCE: ICD-10-CM

## 2024-06-11 NOTE — PROGRESS NOTES
Physical Therapy Daily Treatment Note  [facility] Physical Therapy [facility]  [address], [address]  [place] [address]    Patient: Susana Hammonds   : 1940  Referring practitioner: Sen Bishop MD  Today's Date: 2024  Patient seen for 13 sessions    Visit Diagnoses:    ICD-10-CM ICD-9-CM   1. Low back pain radiating to right lower extremity  M54.50 724.2    M79.604    2. Right hip pain  M25.551 719.45   3. Gait disturbance  R26.9 781.2   4. Poor balance  R26.89 781.99       Subjective   Susana Hammonds reports: that she saw her PCP on Monday.  She is to have an MRI next Friday as well as an abdominal ultrasound.  Also seeing a new dermatologist next week.  Pain still present in right hip at times.  Not constant.  No leg pain.        Objective   Tenderness in right piriformis muscle    See Exercise, Manual, and Modality Logs for complete treatment.     Patient Education: continue stretches.  Try to get out and walk more - in stores using a cart if she needs support to decrease pain    Assessment/Plan  Patient very discouraged today.  Did not want to perform any exercises as she has some pending test next week.  Based on results of MRI and abdominal US we will resume therapy to further work on pain control, LE strength and balance.     Awaiting MD orders           Timed:  Manual Therapy:    17     mins  79627;  Therapeutic Exercise:    0     mins  69842;     Neuromuscular James:    0    mins  74204;    Therapeutic Activity:     10     mins  26375;   Discussed home activities, prevention of further muscle loss by staying active  Ultrasound:      0/8     mins  74758;    Iontophoresis              __0_   mins  Dry Needling               _____   mins        Untimed:  Electrical Stimulation:     0    mins  64226 (MC );  Mechanical Traction:             mins  88656;   Paraffin                       _____  mins     Timed Treatment:   27   mins   Total Treatment:     50    mins    Erica Felder, PT  [place] License # [id number]  Physical Therapist    Electronically signed by Erica Felder, PT, 06/11/24, 11:11 AM EDT

## 2024-06-21 ENCOUNTER — HOSPITAL ENCOUNTER (OUTPATIENT)
Dept: ULTRASOUND IMAGING | Facility: HOSPITAL | Age: 84
Discharge: HOME | End: 2024-06-21
Payer: MEDICARE

## 2024-06-21 ENCOUNTER — HOSPITAL ENCOUNTER (OUTPATIENT)
Dept: MRI IMAGING | Facility: HOSPITAL | Age: 84
Discharge: HOME | End: 2024-06-21
Payer: MEDICARE

## 2024-06-21 DIAGNOSIS — R14.0 ABDOMINAL DISTENSION: ICD-10-CM

## 2024-06-21 DIAGNOSIS — G89.29 CHRONIC MIDLINE LOW BACK PAIN WITHOUT SCIATICA: ICD-10-CM

## 2024-06-21 DIAGNOSIS — M54.50 CHRONIC MIDLINE LOW BACK PAIN WITHOUT SCIATICA: ICD-10-CM

## 2024-06-21 PROCEDURE — 76700 US EXAM ABDOM COMPLETE: CPT

## 2024-06-21 PROCEDURE — 72148 MRI LUMBAR SPINE W/O DYE: CPT

## 2024-06-27 ENCOUNTER — TELEPHONE (OUTPATIENT)
Dept: INTERNAL MEDICINE | Facility: CLINIC | Age: 84
End: 2024-06-27
Payer: MEDICARE

## 2024-06-27 NOTE — TELEPHONE ENCOUNTER
Caller: Susana Hammonds    Relationship: Self    Best call back number: [phone]    What test was performed: MRI    When was the test performed: 06/21    Where was the test performed: [facility]    Additional notes: PLEASE ADVISE.

## 2024-07-01 DIAGNOSIS — G89.29 CHRONIC MIDLINE LOW BACK PAIN WITHOUT SCIATICA: Primary | ICD-10-CM

## 2024-07-01 DIAGNOSIS — M54.50 CHRONIC MIDLINE LOW BACK PAIN WITHOUT SCIATICA: Primary | ICD-10-CM

## 2024-07-22 ENCOUNTER — OFFICE VISIT (OUTPATIENT)
Dept: PAIN MEDICINE | Facility: CLINIC | Age: 84
End: 2024-07-22
Payer: MEDICARE

## 2024-07-22 ENCOUNTER — PREP FOR SURGERY (OUTPATIENT)
Dept: SURGERY | Facility: SURGERY CENTER | Age: 84
End: 2024-07-22
Payer: MEDICARE

## 2024-07-22 VITALS
WEIGHT: 109.4 LBS | DIASTOLIC BLOOD PRESSURE: 91 MMHG | HEIGHT: 55 IN | OXYGEN SATURATION: 98 % | HEART RATE: 75 BPM | TEMPERATURE: 97.1 F | BODY MASS INDEX: 25.32 KG/M2 | SYSTOLIC BLOOD PRESSURE: 166 MMHG

## 2024-07-22 DIAGNOSIS — M53.3 CHRONIC RIGHT SI JOINT PAIN: ICD-10-CM

## 2024-07-22 DIAGNOSIS — M54.50 CHRONIC MIDLINE LOW BACK PAIN WITHOUT SCIATICA: ICD-10-CM

## 2024-07-22 DIAGNOSIS — M51.36 DDD (DEGENERATIVE DISC DISEASE), LUMBAR: Primary | ICD-10-CM

## 2024-07-22 DIAGNOSIS — M46.1 SACROILIITIS: ICD-10-CM

## 2024-07-22 DIAGNOSIS — M51.36 DDD (DEGENERATIVE DISC DISEASE), LUMBAR: ICD-10-CM

## 2024-07-22 DIAGNOSIS — G89.29 CHRONIC RIGHT SI JOINT PAIN: ICD-10-CM

## 2024-07-22 DIAGNOSIS — M47.816 FACET HYPERTROPHY OF LUMBAR REGION: ICD-10-CM

## 2024-07-22 DIAGNOSIS — M53.3 CHRONIC RIGHT SI JOINT PAIN: Primary | ICD-10-CM

## 2024-07-22 DIAGNOSIS — G89.29 CHRONIC MIDLINE LOW BACK PAIN WITHOUT SCIATICA: ICD-10-CM

## 2024-07-22 DIAGNOSIS — G89.29 CHRONIC RIGHT SI JOINT PAIN: Primary | ICD-10-CM

## 2024-07-22 PROCEDURE — 1125F AMNT PAIN NOTED PAIN PRSNT: CPT

## 2024-07-22 PROCEDURE — 1159F MED LIST DOCD IN RCRD: CPT

## 2024-07-22 PROCEDURE — 1160F RVW MEDS BY RX/DR IN RCRD: CPT

## 2024-07-22 PROCEDURE — 99214 OFFICE O/P EST MOD 30 MIN: CPT

## 2024-07-22 NOTE — PROGRESS NOTES
CHIEF COMPLAINT  Back pain    Subjective   Susana Hammonds is a 84 y.o. female.   She presents to the office for evaluation of chronic back pain.  She was referred here by Sen Bishop MD .     Patient reports that back pain has been a chronic issue for man years but had been gradually worsening over the few months. Today pain is 5/10VAS in severity. Pain is located right buttock. When pain severe, pain can radiates across her low back in bandlike pattern.  Denies radicular pain.  Describes this pain as intermittent aching and burning. Pain is worsened by prolonged sitting/standing, walking long distances, bending/twisting, and house hold chores such as sweeping or vacuuming. Pain improves with rest/reposition. She has tried heat and a TENS unit with minimal relief. PT offered temporary relief.     Current medication regiment includes OTC Ibuprofen and Tylenol. She has an old supply of Hydrocodone prescribed by Dr. Bishop but she has not taken this medication recently. No relief with Bengay or IcyHot.     History of lumbar epidurals in the past (2008/2009) prior to laminectomy that were somewhat helpful.    Past therapies:  Physical Therapy: Yes  Chiropractor: Yes - many years ago  Massage Therapy: No  TENS: Yes  Neck or back surgery: Yes  Past pain management: No    Surgical History:  2010 - L5-S1 Laminectomy - Dr. Barrow    Back Pain  This is a chronic problem. The current episode started more than 1 year ago. The problem occurs intermittently. The problem has been worse since onset. The pain is present in the lumbar spine and sacro-iliac. The quality of the pain is described as burning and aching. The pain radiates to the right buttock. The pain is at a severity of 5/10. The pain is moderate. Associated symptoms include numbness (foot). Pertinent negatives include no abdominal pain, dysuria, headaches or weakness. She has tried heat and ice (TENS unit,) for the symptoms.      PEG Assessment   What number  best describes your pain on average in the past week?6  What number best describes how, during the past week, pain has interfered with your enjoyment of life?7  What number best describes how, during the past week, pain has interfered with your general activity?  7      Current Outpatient Medications:     atorvastatin (LIPITOR) 20 MG tablet, , Disp: , Rfl:     buPROPion XL (Wellbutrin XL) 150 MG 24 hr tablet, Take 1 tablet by mouth Daily., Disp: 30 tablet, Rfl: 3    Doxylamine Succinate, Sleep, (SLEEP AID PO), Take 1 tablet by mouth Every Night., Disp: , Rfl:     HYDROcodone-acetaminophen (Norco) 5-325 MG per tablet, Take 1 tablet by mouth Every 12 (Twelve) Hours As Needed for Mild Pain. (Patient taking differently: Take 1 tablet by mouth Every 12 (Twelve) Hours As Needed for Mild Pain. prn), Disp: 46 tablet, Rfl: 0    latanoprost (XALATAN) 0.005 % ophthalmic solution, 1 drop Every Night., Disp: , Rfl:     levothyroxine (SYNTHROID, LEVOTHROID) 50 MCG tablet, Take 1 tablet by mouth once daily, Disp: 90 tablet, Rfl: 2    levothyroxine (SYNTHROID, LEVOTHROID) 75 MCG tablet, Take 1 tablet by mouth once daily, Disp: 90 tablet, Rfl: 3    Polyethylene Glycol 3350 (MIRALAX PO), Take  by mouth Daily., Disp: , Rfl:     The following portions of the patient's history were reviewed and updated as appropriate: allergies, current medications, past family history, past medical history, past social history, past surgical history, and problem list.    REVIEW OF PERTINENT MEDICAL DATA  Reviewed office note from Dr. Sen Bishop from 6/4/2024.  MRI of her lumbar spine was ordered at that time.    MRI OF THE LUMBAR SPINE WITHOUT CONTRAST     CLINICAL HISTORY:chronic low back pain in spite of PT.; M54.50-Low back  pain, unspecified; G89.29-Other chronic pain     TECHNIQUE: MRI of the lumbar spine was obtained with sagittal T1,  proton-density, and T2-weighted images. Additionally, there are axial T1  and T2-weighted images through  the lumbar spine.     COMPARISON:There are no previous MRI examinations of the lumbar spine  available for comparison.     FINDINGS:     The conus medullaris terminates at the level of the mid body of L1 and  has normal signal intensity.     There is moderate levoconvex sclerotic curvature of the lumbar spine  with its apex centered at L3.     At T12-L1, there are advanced degenerative disc changes. There is  degenerative endplate marrow edema. There is degenerative retrolisthesis  of T12 on L1 by approximately 3 mm. There is a disc bulge which results  in a mild degree of canal stenosis. Mild facet hypertrophic changes are  noted.     At L1-2, there is a disc bulge which results in mild canal narrowing.  There is moderate facet arthropathy which mildly narrows the right  neural foramen.     At L2-3, there is mild right foraminal narrowing secondary to a disc  osteophyte complex, facet arthropathy, and loss of foraminal height.  There is no significant degree of left foraminal narrowing. No  significant canal stenosis is noted. Prominent degenerative disc changes  are seen on the right side of the disc space     At L3-4, there is advanced degenerative disc change seen along the right  side of the disc space. There is no significant canal or foraminal  stenosis. There is mild to moderate facet hypertrophic change.     At L4-5, there is degenerative anterior spondylolisthesis of L4 on L5 by  approximately 11 to 12 mm. Unroofed disc material results in mild to  moderate foraminal narrowing in conjunction with moderate facet  hypertrophic change. The facet hypertrophy abuts the posterior aspects  of both L5 nerve root sleeves within the lateral recesses. Unroofed disc  material in the anterior spondylolisthesis of L4 on L5 results in mild  to moderate foraminal narrowing. Prominent degenerative disc changes are  seen throughout the disc space.     At L5-S1, there is been a prior left laminectomy procedure. There is  a  disc bulge which abuts both descending S1 nerve root sleeves. There is a  small central disc protrusion which abuts the medial aspect of the  descending right S1 nerve root sleeve. Additionally, there is a small  left subarticular region disc protrusion which abuts and displaces  mildly posteriorly the descending left S1 nerve root sleeve. There is  moderate left foraminal narrowing secondary to loss of foraminal height  and a disc osteophyte complex. There is no significant degree of right  foraminal narrowing. No significant canal stenosis is identified. Again,  advanced degenerative disc changes are seen within the disc space.     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     There has been a prior left laminectomy procedure at L5-S1. At L5-S1,  there is a disc bulge which abuts both descending S1 nerve root sleeves.  There is a small central protrusion as well as a small left subarticular  region disc protrusion. The central disc protrusion abuts the medial  aspect of the right S1 nerve root sleeve and the left subarticular  region disc protrusion abuts and mildly posteriorly displaces the  descending left S1 nerve root sleeve. There is moderate left foraminal  narrowing secondary to loss of foraminal height, a disc osteophyte  complex, and facet hypertrophic change. This is the most prominent  foraminal stenosis within the lumbar spine.     At L4-5, there is degenerative anterior spondylolisthesis of L4 on L5 by  approximately 11 to 12 mm. Facet hypertrophic changes at L4-5 abut the  posterior aspects of both L5 nerve root sleeves within the lateral  recesses. Mild to moderate bilateral L4-5 foraminal narrowing is noted.     The remaining multilevel degenerative phenomena within the lumbar spine  are as discussed in detail above.     This report was finalized on 6/23/2024 8:39 AM by Dr. Xiang Black M.D  on Workstation:  "[id number]  Review of Systems   Constitutional:  Positive for activity change. Negative for fatigue.   Gastrointestinal:  Negative for abdominal pain, constipation and diarrhea.   Genitourinary:  Negative for difficulty urinating and dysuria.   Musculoskeletal:  Positive for back pain.   Neurological:  Positive for numbness (foot). Negative for dizziness, weakness, light-headedness and headaches.   Psychiatric/Behavioral:  Negative for agitation, sleep disturbance and suicidal ideas. The patient is not nervous/anxious.      I have reviewed and confirmed the accuracy of the ROS as documented by the MA/LPN/RN BRANDO Moreno    Vitals:    07/22/24 0945   BP: 166/91   BP Location: Right arm   Patient Position: Sitting   Cuff Size: Adult   Pulse: 75   Temp: 97.1 °F (36.2 °C)   SpO2: 98%   Weight: 49.6 kg (109 lb 6.4 oz)   Height: 61 cm (24.01\")   PainSc:   5   PainLoc: Back     Objective     Physical Exam  Constitutional:       Appearance: Normal appearance.   HENT:      Head: Normocephalic.   Cardiovascular:      Rate and Rhythm: Normal rate and regular rhythm.      Pulses:           Dorsalis pedis pulses are 2+ on the right side.        Posterior tibial pulses are 2+ on the right side.   Pulmonary:      Effort: Pulmonary effort is normal.      Breath sounds: Normal breath sounds.   Musculoskeletal:         General: Normal range of motion.      Cervical back: Normal range of motion.      Lumbar back: Tenderness and bony tenderness present. Decreased range of motion. Negative right straight leg raise test and negative left straight leg raise test.      Right lower leg: Edema present.      Left lower leg: Edema present.      Comments: + Right SI joint tenderness  + Right FADI's test  + Right Gaenslen's test   Skin:     General: Skin is warm and dry.      Capillary Refill: Capillary refill takes less than 2 seconds.   Neurological:      General: No focal deficit present.      Mental Status: She is alert and " oriented to person, place, and time.   Psychiatric:         Mood and Affect: Mood normal.         Behavior: Behavior normal.         Thought Content: Thought content normal.         Cognition and Memory: Cognition normal.       Assessment & Plan   Diagnoses and all orders for this visit:    1. DDD (degenerative disc disease), lumbar (Primary)    2. Chronic right SI joint pain    3. Sacroiliitis    4. Facet hypertrophy of lumbar region    5. Chronic midline low back pain without sciatica      --- Susana Hammonds reports a pain score of 5.  Given her pain assessment as noted, treatment options were discussed and the following options were decided upon as a follow-up plan to address the patient's pain: continuation of current treatment plan for pain and steroid injections.    --- Right SI Joint Injection  ----------  Education about Sacroiliac joint injections:  This Sacroiliac joint injection (blockade) we have suggested is intended for diagnostic purposes, with the intent of offering the patient Radiofrequency thermal rhizotomy (RF) of the sensory branches to the joint if the block is diagnostically effective.  The diagnostic blockade is necessary to determine the likelihood that RF therapy could be efficacious in providing long term relief to the patient.    In this procedure, the sacroiliac joint is aligned with imaging, and under image guidance a needle is placed with the needle tip into the joint.  The needle position is confirmed to be appropriately in the joint before injection of medication into the joint.  When xray fluoroscopy is used, contrast dye is used to confirm a proper arthrogram (i.e., outline of the joint).  When ultrasound is used, IV fluid (normal saline) is injected to see the flow of the fluid into the joint.  Once confirmed, then the medication can be injected into the joint.  Oftentimes this medication is a combination of local anesthetics (for diagnostic purposes) and also a steroid (to  decrease irritation & inflammation in the joint, also known as sacroilitis).      Medically, a successful RF procedure should provide a patient with 50% pain relief or more for at least 6 months.  Clinical experience suggests that successful patients receive relief more in the range of 12 months on average.  We also discussed that many patients receive therapeutic success from the intraarticular joint injection, and may not require RF ablation.  If a patient receives more than 8 weeks of relief from joint injection, then occasional repeat joint blocks for therapeutic purposes is a very reasonable alternative therapy.  This course of therapy is consistent with our LCDs according to our CMS  in the area, and therefore other insurance providers should follow accordingly.  We will monitor our patients to screen for these therapeutic responders and will offer RF therapy only when necessary.      We discussed that joint injections & also RF procedures are not without risks.  Best practices regarding anticoagulant use & neuraxial procedures will be respected.  Oftentimes a patient on an anticoagulant can be offered a joint injection safely, but again this is not risk-free, and such patients give consent with regards to this increased bleeding risk, which could cause problems including but not limited to worsening of pain, nerve damage, or muscle damage.  Patients that are ill or otherwise may be at risk for sepsis will not have their spines accessed by neuraxial injections of any type.  This patient will not be offered these therapies if there is an increased risk.   We discussed that there is a risk of postprocedural pain and also a risk of worsening of clinical picture with these procedures as with any neuraxial procedure.    ----------   --- Reviewed lumbar MRI with patient. If no relief from SI joint injections, may consider lumbar MBB/RFA due to joint arthropathy and degenerative disc disease  ---  Follow-up for procedure     Pain / Disability Scale  The scale used for measurement of pain and/or disability for this patient was the [place] back pain disability scale.  The score was 13 on 07/22/2024    YARED REPORT  As the clinician, I personally reviewed the YARED from 7/22/24 while the patient was in the office today.    Dictated utilizing Dragon dictation.

## 2024-07-22 NOTE — PATIENT INSTRUCTIONS
----------  Education about Sacroiliac joint injections:  This Sacroiliac joint injection (blockade) we have suggested is intended for diagnostic purposes, with the intent of offering the patient Radiofrequency thermal rhizotomy (RF) of the sensory branches to the joint if the block is diagnostically effective.  The diagnostic blockade is necessary to determine the likelihood that RF therapy could be efficacious in providing long term relief to the patient.    In this procedure, the sacroiliac joint is aligned with imaging, and under image guidance a needle is placed with the needle tip into the joint.  The needle position is confirmed to be appropriately in the joint before injection of medication into the joint.  When xray fluoroscopy is used, contrast dye is used to confirm a proper arthrogram (i.e., outline of the joint).  When ultrasound is used, IV fluid (normal saline) is injected to see the flow of the fluid into the joint.  Once confirmed, then the medication can be injected into the joint.  Oftentimes this medication is a combination of local anesthetics (for diagnostic purposes) and also a steroid (to decrease irritation & inflammation in the joint, also known as sacroilitis).      Medically, a successful RF procedure should provide a patient with 50% pain relief or more for at least 6 months.  Clinical experience suggests that successful patients receive relief more in the range of 12 months on average.  We also discussed that many patients receive therapeutic success from the intraarticular joint injection, and may not require RF ablation.  If a patient receives more than 8 weeks of relief from joint injection, then occasional repeat joint blocks for therapeutic purposes is a very reasonable alternative therapy.  This course of therapy is consistent with our LCDs according to our CMS  in the area, and therefore other insurance providers should follow accordingly.  We will monitor our  patients to screen for these therapeutic responders and will offer RF therapy only when necessary.      We discussed that joint injections & also RF procedures are not without risks.  Best practices regarding anticoagulant use & neuraxial procedures will be respected.  Oftentimes a patient on an anticoagulant can be offered a joint injection safely, but again this is not risk-free, and such patients give consent with regards to this increased bleeding risk, which could cause problems including but not limited to worsening of pain, nerve damage, or muscle damage.  Patients that are ill or otherwise may be at risk for sepsis will not have their spines accessed by neuraxial injections of any type.  This patient will not be offered these therapies if there is an increased risk.   We discussed that there is a risk of postprocedural pain and also a risk of worsening of clinical picture with these procedures as with any neuraxial procedure.    ----------

## 2024-07-23 ENCOUNTER — TELEPHONE (OUTPATIENT)
Dept: PAIN MEDICINE | Facility: CLINIC | Age: 84
End: 2024-07-23

## 2024-07-23 NOTE — TELEPHONE ENCOUNTER
Caller: Susana Hammonds    Relationship to patient: Self    Best call back number: [phone]    Type of visit: INJECTION    Requested date: ASAP IN [place]     If rescheduling, when is the original appointment: 08/06/24     Additional notes: PT WOULD LIKE TO BE SEEN SOONER FOR THIS INJ AT [place] IF POSSIBLE. PLEASE CONTACT PT TO DISCUSS.

## 2024-07-24 NOTE — TELEPHONE ENCOUNTER
Caller: Susana Hammonds    Relationship: Self    Best call back number: [phone]    What is the best time to reach you: ANYTIME     Who are you requesting to speak with (clinical staff, provider,  specific staff member): CLINICAL     Do you know the name of the person who called: PATIENT INCOMING     What was the call regarding: PATIENT WOULD LIKE TO SEE ABOUT RESCHEDULING TO [place] FOR SOONER APPT AND WANTS TO KNOW WHY SHE HAD TO BE AT THE LOCATION AN HOUR PRIOR TO HER APPT TIME. PLEASE ADVISE. WILL BE OUT FROM 11-4 TODAY AND AVAILABLE ALL DAY TOMORROW.

## 2024-07-25 NOTE — TELEPHONE ENCOUNTER
Hub staff attempted to follow warm transfer process and was unsuccessful     Caller: Susana Hammonds    Relationship to patient: Self    Best call back number: [phone]    Patient is needing: PT CALLING TO GET AN UPDATE IN REGARDS TO GETTING INJ ON 08/06/24 SWITCHED TO [facility]. PT ALSO HAS SOME QUESTIONS ABOUT THE INJECTION ITSELF AND WOULD LIKE TO DISCUSS THEM OVER THE PHONE WITH SOMEONE ON CLINICAL. PLEASE CONTACT PT TO DISCUSS.

## 2024-07-26 ENCOUNTER — PATIENT ROUNDING (BHMG ONLY) (OUTPATIENT)
Dept: PAIN MEDICINE | Facility: CLINIC | Age: 84
End: 2024-07-26
Payer: MEDICARE

## 2024-07-26 NOTE — PROGRESS NOTES
July 26, 2024    Hello, may I speak with Susana Hammonds?    My name is Zaida    I am  with [facility]  [facility] [address] [address] [phone].    Before we get started may I verify your date of birth? 1940    I am calling to officially welcome you to our practice and ask about your recent visit. Is this a good time to talk? yes    Tell me about your visit with us. What things went well?  the visit went well       We're always looking for ways to make our patients' experiences even better. Do you have recommendations on ways we may improve?  no    Overall were you satisfied with your first visit to our practice? yes       I appreciate you taking the time to speak with me today. Is there anything else I can do for you? no      Thank you, and have a great day.

## 2024-07-30 ENCOUNTER — HOSPITAL ENCOUNTER (OUTPATIENT)
Dept: GENERAL RADIOLOGY | Facility: HOSPITAL | Age: 84
Discharge: HOME | End: 2024-07-30
Payer: MEDICARE

## 2024-07-30 ENCOUNTER — HOSPITAL ENCOUNTER (OUTPATIENT)
Dept: PAIN MEDICINE | Facility: HOSPITAL | Age: 84
Discharge: HOME | End: 2024-07-30
Payer: MEDICARE

## 2024-07-30 VITALS
BODY MASS INDEX: 20.39 KG/M2 | SYSTOLIC BLOOD PRESSURE: 161 MMHG | TEMPERATURE: 98.2 F | OXYGEN SATURATION: 95 % | HEIGHT: 61 IN | DIASTOLIC BLOOD PRESSURE: 91 MMHG | WEIGHT: 108 LBS | HEART RATE: 73 BPM | RESPIRATION RATE: 18 BRPM

## 2024-07-30 DIAGNOSIS — M46.1 SACROILIITIS: ICD-10-CM

## 2024-07-30 DIAGNOSIS — R52 PAIN: ICD-10-CM

## 2024-07-30 DIAGNOSIS — M53.3 CHRONIC RIGHT SI JOINT PAIN: ICD-10-CM

## 2024-07-30 DIAGNOSIS — G89.29 CHRONIC RIGHT SI JOINT PAIN: ICD-10-CM

## 2024-07-30 DIAGNOSIS — M51.36 DDD (DEGENERATIVE DISC DISEASE), LUMBAR: ICD-10-CM

## 2024-07-30 PROCEDURE — 25010000002 DEXAMETHASONE SODIUM PHOSPHATE 10 MG/ML SOLUTION: Performed by: ANESTHESIOLOGY

## 2024-07-30 PROCEDURE — C1755 CATHETER, INTRASPINAL: HCPCS

## 2024-07-30 PROCEDURE — 77002 NEEDLE LOCALIZATION BY XRAY: CPT

## 2024-07-30 PROCEDURE — 25510000001 IOPAMIDOL 61 % SOLUTION: Performed by: ANESTHESIOLOGY

## 2024-07-30 RX ORDER — DEXAMETHASONE SODIUM PHOSPHATE 10 MG/ML
10 INJECTION, SOLUTION INTRAMUSCULAR; INTRAVENOUS ONCE
Status: COMPLETED | OUTPATIENT
Start: 2024-07-30 | End: 2024-07-30

## 2024-07-30 RX ORDER — LIDOCAINE HYDROCHLORIDE 10 MG/ML
5 INJECTION, SOLUTION EPIDURAL; INFILTRATION; INTRACAUDAL; PERINEURAL ONCE
Status: COMPLETED | OUTPATIENT
Start: 2024-07-30 | End: 2024-07-30

## 2024-07-30 RX ADMIN — IOPAMIDOL 3 ML: 612 INJECTION, SOLUTION INTRAVENOUS at 11:35

## 2024-07-30 RX ADMIN — DEXAMETHASONE SODIUM PHOSPHATE 10 MG: 10 INJECTION INTRAMUSCULAR; INTRAVENOUS at 11:35

## 2024-07-30 RX ADMIN — LIDOCAINE HYDROCHLORIDE 5 ML: 10 INJECTION, SOLUTION EPIDURAL; INFILTRATION; INTRACAUDAL; PERINEURAL at 11:35

## 2024-07-30 NOTE — PROCEDURES
Procedures    Right Sacroiliac Joint Injection  [facility]      PREOPERATIVE DIAGNOSIS:   Sacroiliac joint dysfunction    POSTOPERATIVE DIAGNOSIS:  Same    PROCEDURE:  Sacroiliac Joint Injection, with fluoroscopic guidance CPT 46014 -RT    PRE-PROCEDURE DISCUSSION WITH PATIENT:    Risks and complications were discussed with the patient prior to starting the procedure and informed consent was obtained.  We discussed various topics including but not limited to bleeding, infection, injury, postprocedural site soreness, painful flareup, worsening of clinical picture, paralysis, coma, and death.     SURGEON:  Valerie Duran MD    REASON FOR PROCEDURE:    Patient has pain consistent with SI pathology on history and physical exam.    SEDATION:  No sedation was used for this procedure  ANESTHETIC AGENT:  1% Lidocaine  STEROID AGENT:  10mg Dexamethasone    DESCRIPTON OF PROCEDURE:  After obtaining informed consent, IV access was not obtained in the preoperative area.  The patient was transported to the operative suite and placed in the prone position. Heart rate, blood pressure, and pulse oximeter were monitored. The lumbosacral area was prepped with Chloraprep and draped in a sterile fashion. Under fluoroscopic guidance the inferior most portion of the right SI joint was identified. The overlying skin and subcutaneous tissue was anesthetized with 1% lidocaine. A 22-gauge spinal needle was then advanced under fluoroscopic guidance in a coaxial view into the joint space.  After negative aspiration, 1 mL of Isovue 61% was injected, and after seeing appropriate spread into the joint a volume of 3ml of the above medication was injected.    Needle was removed intact.      Vital signs remained stable.  The onset of analgesia was noted.    Pre-procedure pain score: 5/10 at rest, 10/10 with activity  Post-procedure pain score: 0/10      ESTIMATED BLOOD LOSS:  minimal  SPECIMENS:  None  COMPLICATIONS:  No complications  were noted.    TOLERANCE & DISCHARGE CONDITION:    The patient tolerated the procedure well.  The patient was transported to the recovery area without difficulties.  The patient was discharged to home under the care of family in stable and satisfactory condition.    PLAN OF CARE:  The patient was given our standard instruction sheet and will resume all medications as per the medication reconciliation sheet.  The patient will Return to clinic 4-6 wks.  The patient is instructed to keep an account of pain relief after the procedure.

## 2024-07-30 NOTE — H&P
Susana Hammonds is a 84 y.o.  female who returns today for a scheduled procedure.  The previous clinic note has been reviewed.  There has been no change in the location or quality of the patient's pain.  The pain is currently rated as 5/10 in severity.  The patient is alert at the time of exam and is emotionally appropriate without significant debilities that would disqualify from the procedure.     Planned Procedure: Right sacroiliac joint injection    Vitals:    07/30/24 1037   BP: 150/74   Pulse: 80   Resp: 16   Temp: 98.5 °F (36.9 °C)   SpO2: 95%       The risks and benefits of the procedure have been discussed with the patient who has elected to proceed.  There are no contraindications to the procedure at this time.  Appropriate consent forms have been signed.  All questions regarding the planned procedure have been addressed.  Please see the separate documentation for details of the interventional procedure.

## 2024-07-30 NOTE — DISCHARGE INSTRUCTIONS
[facility] - Post-procedure Instructions              --  While there are no absolute restrictions, it is recommended that you do not perform strenuous activity today. In the morning, you may resume your level of activity as before your block.    --  If you have a band-aid at your injection site, please remove it later today. Observe the area for any redness, swelling, pus-like drainage, or a temperature over 101°. If any of these symptoms occur, please call your doctor at [phone]. If after office hours, leave a message and the on-call provider will return your call.    --  Ice may be applied to your injection site. It is recommended you avoid direct heat (heating pad; hot tub) for 1-2 days.    --  Call [facility] at [phone] if you experience persistent headache, persistent bleeding from the injection site, or severe pain not relieved by heat or oral medication.    --  Do not make important decisions today.    --  Due to the effects of the block and/or the Sedation, DO NOT drive or operate hazardous machinery for 12 hours.  Local anesthetics may cause numbness after procedure and precautions must be taken with regards to operating equipment as well as with walking, even if ambulating with assistance of another person or with an assistive device.    --  Do not drink alcohol for 12 hours.    -- You may return to work tomorrow, or as directed by your referring doctor.    --  Occasionally you may notice a slight increase in your pain after the procedure. This should start to improve within the next 24-48 hours. Radiofrequency ablation procedure pain may last 3-4 weeks.    --  It may take as long as 3-4 days before you notice a gradual improvement in your pain and/or other symptoms.    -- You may continue to take your prescribed pain medication as needed.    --  Some normal possible side effects of steroid use could include fluid retention, increased blood sugar, dull headache, increased  sweating, increased appetite, mood swings and flushing.    --  Diabetics are recommended to watch their blood glucose level closely for 24-48 hours after the injection.    --  Must stay in PACU for 20 min upon arrival and prove no leg weakness before being discharged.    --  IN THE EVENT OF A LIFE THREATENING EMERGENCY, (CHEST PAIN, BREATHING DIFFICULTIES, PARALYSIS…) YOU SHOULD GO TO YOUR NEAREST EMERGENCY ROOM.    --  You should be contacted by our office within 2-3 days to schedule follow up or next appointment date (if not already scheduled).  If not contacted within 7 days, please call the office at [phone].

## 2024-08-01 ENCOUNTER — TELEPHONE (OUTPATIENT)
Dept: PAIN MEDICINE | Facility: HOSPITAL | Age: 84
End: 2024-08-01

## 2024-08-01 NOTE — TELEPHONE ENCOUNTER
Post procedure follow up pain injection: Patient stated felt better the day of procedure but now pain has come back and its worse at times, instructed patient to call office on Monday to see if earlier appointment is available. Patient stated taking motrin over the counter for pain and its not helping

## 2024-08-06 ENCOUNTER — HOSPITAL ENCOUNTER (OUTPATIENT)
Dept: PAIN MEDICINE | Facility: HOSPITAL | Age: 84
Discharge: HOME | End: 2024-08-06
Payer: MEDICARE

## 2024-08-15 ENCOUNTER — TELEPHONE (OUTPATIENT)
Dept: PAIN MEDICINE | Facility: CLINIC | Age: 84
End: 2024-08-15

## 2024-08-15 NOTE — TELEPHONE ENCOUNTER
Caller: Susana Hammonds    Relationship: Self    Best call back number: [phone] (home)     Who are you requesting to speak with (clinical staff, provider,  specific staff member): CLINICAL    What was the call regarding: MS HAMMONDS HAD AN SI INJECTION ON 8/6/24. SHE STATED IT DID NOT HELP MUCH AND SHE IS STILL HAVING A LOT OF PAIN AND WOULD LIKE A CALL BACK TO DISCUSS WHAT SHE ELSE SHE CAN DO TO HELP THE PAIN    Is it okay if the provider responds through MyChart: CALL

## 2024-08-19 ENCOUNTER — PREP FOR SURGERY (OUTPATIENT)
Dept: SURGERY | Facility: SURGERY CENTER | Age: 84
End: 2024-08-19
Payer: MEDICARE

## 2024-08-19 ENCOUNTER — OFFICE VISIT (OUTPATIENT)
Dept: PAIN MEDICINE | Facility: CLINIC | Age: 84
End: 2024-08-19
Payer: MEDICARE

## 2024-08-19 VITALS
WEIGHT: 108.6 LBS | OXYGEN SATURATION: 95 % | DIASTOLIC BLOOD PRESSURE: 77 MMHG | HEIGHT: 61 IN | SYSTOLIC BLOOD PRESSURE: 164 MMHG | HEART RATE: 77 BPM | TEMPERATURE: 98 F | BODY MASS INDEX: 20.5 KG/M2 | RESPIRATION RATE: 12 BRPM

## 2024-08-19 DIAGNOSIS — M53.3 CHRONIC RIGHT SI JOINT PAIN: Primary | ICD-10-CM

## 2024-08-19 DIAGNOSIS — M47.816 FACET HYPERTROPHY OF LUMBAR REGION: ICD-10-CM

## 2024-08-19 DIAGNOSIS — M51.36 DDD (DEGENERATIVE DISC DISEASE), LUMBAR: ICD-10-CM

## 2024-08-19 DIAGNOSIS — G89.29 CHRONIC MIDLINE LOW BACK PAIN WITHOUT SCIATICA: ICD-10-CM

## 2024-08-19 DIAGNOSIS — M46.1 SACROILIITIS: ICD-10-CM

## 2024-08-19 DIAGNOSIS — M54.50 CHRONIC MIDLINE LOW BACK PAIN WITHOUT SCIATICA: ICD-10-CM

## 2024-08-19 DIAGNOSIS — G89.29 CHRONIC RIGHT SI JOINT PAIN: Primary | ICD-10-CM

## 2024-08-19 PROCEDURE — 1159F MED LIST DOCD IN RCRD: CPT

## 2024-08-19 PROCEDURE — 1160F RVW MEDS BY RX/DR IN RCRD: CPT

## 2024-08-19 PROCEDURE — 99214 OFFICE O/P EST MOD 30 MIN: CPT

## 2024-08-19 PROCEDURE — 1125F AMNT PAIN NOTED PAIN PRSNT: CPT

## 2024-08-19 RX ORDER — GABAPENTIN 100 MG/1
CAPSULE ORAL
Qty: 90 CAPSULE | Refills: 0 | Status: SHIPPED | OUTPATIENT
Start: 2024-08-19

## 2024-08-19 NOTE — PROGRESS NOTES
CHIEF COMPLAINT  Back pain     Subjective   Susana Hammonds is a 84 y.o. female  who presents to the office for follow-up of procedure.  She completed a Right SI injection on 7/30/24 performed by Dr. Duran for management of right buttock pain. Patient reports 75% relief from the procedure x 10 days. She reports that she was able to more active following the procedure.     Today pain is 5/10VAS in severity. Pain continues to be located in her right buttock. When pain severe, pain can radiates across her low back in bandlike pattern.  Denies radicular pain.  Describes this pain as intermittent aching and burning. Pain is worsened by prolonged sitting/standing, walking long distances, bending/twisting, and house hold chores such as sweeping or vacuuming. Pain improves with rest/reposition. She has tried heat and a TENS unit with minimal relief. PT offered temporary relief.      Current medication regiment includes OTC Ibuprofen and Tylenol. She has an old supply of Hydrocodone prescribed by Dr. Bishop but she has not taken this medication recently. No relief with Bengay or IcyHot. She has taken Gabapentin in the past following a fall. She did not experience any side effects but can not recall it is was beneficial. She asks about trying this medication again.      History of lumbar epidurals in the past (2008/2009) prior to laminectomy that were somewhat helpful.    Procedures:  7/30/24 - Right SI Joint Injection - 75% relief x 10 days    Surgical History:  2010 - L5-S1 Laminectomy - Dr. Barrow    Back Pain  This is a chronic problem. The current episode started more than 1 year ago. The problem occurs intermittently. The problem is unchanged. The pain is present in the lumbar spine and sacro-iliac. The quality of the pain is described as burning and aching. The pain radiates to the right buttock. The pain is at a severity of 5/10. The pain is moderate. Pertinent negatives include no abdominal pain, dysuria, fever,  headaches, numbness or weakness. She has tried heat and ice (TENS unit,) for the symptoms.      PEG Assessment   What number best describes your pain on average in the past week?7  What number best describes how, during the past week, pain has interfered with your enjoyment of life?0  What number best describes how, during the past week, pain has interfered with your general activity?  0    Review of Pertinent Medical Data ---  MRI OF THE LUMBAR SPINE WITHOUT CONTRAST     CLINICAL HISTORY:chronic low back pain in spite of PT.; M54.50-Low back  pain, unspecified; G89.29-Other chronic pain     TECHNIQUE: MRI of the lumbar spine was obtained with sagittal T1,  proton-density, and T2-weighted images. Additionally, there are axial T1  and T2-weighted images through the lumbar spine.     COMPARISON:There are no previous MRI examinations of the lumbar spine  available for comparison.     FINDINGS:     The conus medullaris terminates at the level of the mid body of L1 and  has normal signal intensity.     There is moderate levoconvex sclerotic curvature of the lumbar spine  with its apex centered at L3.     At T12-L1, there are advanced degenerative disc changes. There is  degenerative endplate marrow edema. There is degenerative retrolisthesis  of T12 on L1 by approximately 3 mm. There is a disc bulge which results  in a mild degree of canal stenosis. Mild facet hypertrophic changes are  noted.     At L1-2, there is a disc bulge which results in mild canal narrowing.  There is moderate facet arthropathy which mildly narrows the right  neural foramen.     At L2-3, there is mild right foraminal narrowing secondary to a disc  osteophyte complex, facet arthropathy, and loss of foraminal height.  There is no significant degree of left foraminal narrowing. No  significant canal stenosis is noted. Prominent degenerative disc changes  are seen on the right side of the disc space     At L3-4, there is advanced degenerative disc  change seen along the right  side of the disc space. There is no significant canal or foraminal  stenosis. There is mild to moderate facet hypertrophic change.     At L4-5, there is degenerative anterior spondylolisthesis of L4 on L5 by  approximately 11 to 12 mm. Unroofed disc material results in mild to  moderate foraminal narrowing in conjunction with moderate facet  hypertrophic change. The facet hypertrophy abuts the posterior aspects  of both L5 nerve root sleeves within the lateral recesses. Unroofed disc  material in the anterior spondylolisthesis of L4 on L5 results in mild  to moderate foraminal narrowing. Prominent degenerative disc changes are  seen throughout the disc space.     At L5-S1, there is been a prior left laminectomy procedure. There is a  disc bulge which abuts both descending S1 nerve root sleeves. There is a  small central disc protrusion which abuts the medial aspect of the  descending right S1 nerve root sleeve. Additionally, there is a small  left subarticular region disc protrusion which abuts and displaces  mildly posteriorly the descending left S1 nerve root sleeve. There is  moderate left foraminal narrowing secondary to loss of foraminal height  and a disc osteophyte complex. There is no significant degree of right  foraminal narrowing. No significant canal stenosis is identified. Again,  advanced degenerative disc changes are seen within the disc space.     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     There has been a prior left laminectomy procedure at L5-S1. At L5-S1,  there is a disc bulge which abuts both descending S1 nerve root sleeves.  There is a small central protrusion as well as a small left subarticular  region disc protrusion. The central disc protrusion abuts the medial  aspect of the right S1 nerve root sleeve and the left subarticular  region disc protrusion abuts and mildly posteriorly  "displaces the  descending left S1 nerve root sleeve. There is moderate left foraminal  narrowing secondary to loss of foraminal height, a disc osteophyte  complex, and facet hypertrophic change. This is the most prominent  foraminal stenosis within the lumbar spine.     At L4-5, there is degenerative anterior spondylolisthesis of L4 on L5 by  approximately 11 to 12 mm. Facet hypertrophic changes at L4-5 abut the  posterior aspects of both L5 nerve root sleeves within the lateral  recesses. Mild to moderate bilateral L4-5 foraminal narrowing is noted.     The remaining multilevel degenerative phenomena within the lumbar spine  are as discussed in detail above.     This report was finalized on 6/23/2024 8:39 AM by Dr. Xiang Black M.D  on Workstation: [id number]    The following portions of the patient's history were reviewed and updated as appropriate: allergies, current medications, past family history, past medical history, past social history, past surgical history, and problem list.    Review of Systems   Constitutional:  Negative for activity change, fatigue and fever.   Respiratory:  Negative for cough and chest tightness.    Gastrointestinal:  Positive for constipation. Negative for abdominal pain and diarrhea.   Genitourinary:  Negative for difficulty urinating and dysuria.   Musculoskeletal:  Positive for back pain.   Neurological:  Negative for dizziness, weakness, light-headedness, numbness and headaches.   Psychiatric/Behavioral:  Positive for agitation. Negative for sleep disturbance and suicidal ideas. The patient is nervous/anxious.      I have reviewed and confirmed the accuracy of the ROS as documented by the MA/LPN/RN BRANDO Moreno     Vitals:    08/19/24 1140   BP: 164/77   BP Location: Right arm   Patient Position: Sitting   Cuff Size: Adult   Pulse: 77   Resp: 12   Temp: 98 °F (36.7 °C)   TempSrc: Temporal   SpO2: 95%   Weight: 49.3 kg (108 lb 9.6 oz)   Height: 154.9 cm (61\") "   PainSc:   5     Objective   Physical Exam  Constitutional:       Appearance: Normal appearance.   HENT:      Head: Normocephalic.   Cardiovascular:      Rate and Rhythm: Normal rate and regular rhythm.      Pulses:           Dorsalis pedis pulses are 2+ on the right side.        Posterior tibial pulses are 2+ on the right side.   Pulmonary:      Effort: Pulmonary effort is normal.      Breath sounds: Normal breath sounds.   Musculoskeletal:      Cervical back: Normal range of motion.      Lumbar back: Tenderness and bony tenderness present. Decreased range of motion. Negative right straight leg raise test and negative left straight leg raise test.      Right lower leg: Edema (ESTEVAN hose in place) present.      Left lower leg: Edema (ESTEVAN hose in place) present.      Comments: + Right SI joint tenderness  + Right FADI's test  + Right Gaenslen's test   Skin:     General: Skin is warm and dry.      Capillary Refill: Capillary refill takes less than 2 seconds.   Neurological:      General: No focal deficit present.      Mental Status: She is alert and oriented to person, place, and time.   Psychiatric:         Mood and Affect: Mood normal.         Behavior: Behavior normal.         Thought Content: Thought content normal.         Cognition and Memory: Cognition normal.       Assessment & Plan   Diagnoses and all orders for this visit:    1. Chronic right SI joint pain (Primary)  -     gabapentin (NEURONTIN) 100 MG capsule; Take 1 capsule by mouth at night for 7 days. If tolerating well, may increase to 1 capsule twice a day with goal of taking medication three times per day  Dispense: 90 capsule; Refill: 0    2. Sacroiliitis  -     gabapentin (NEURONTIN) 100 MG capsule; Take 1 capsule by mouth at night for 7 days. If tolerating well, may increase to 1 capsule twice a day with goal of taking medication three times per day  Dispense: 90 capsule; Refill: 0    3. DDD (degenerative disc disease), lumbar  -     gabapentin  (NEURONTIN) 100 MG capsule; Take 1 capsule by mouth at night for 7 days. If tolerating well, may increase to 1 capsule twice a day with goal of taking medication three times per day  Dispense: 90 capsule; Refill: 0    4. Facet hypertrophy of lumbar region    5. Chronic midline low back pain without sciatica      Susana Hammonds reports a pain score of 5.  Given her pain assessment as noted, treatment options were discussed and the following options were decided upon as a follow-up plan to address the patient's pain: continuation of current treatment plan for pain and steroid injections.    --- Repeat Right SI Joint Injection  ----------  Education about Sacroiliac joint injections:  This Sacroiliac joint injection (blockade) we have suggested is intended for diagnostic purposes, with the intent of offering the patient Radiofrequency thermal rhizotomy (RF) of the sensory branches to the joint if the block is diagnostically effective.  The diagnostic blockade is necessary to determine the likelihood that RF therapy could be efficacious in providing long term relief to the patient.    In this procedure, the sacroiliac joint is aligned with imaging, and under image guidance a needle is placed with the needle tip into the joint.  The needle position is confirmed to be appropriately in the joint before injection of medication into the joint.  When xray fluoroscopy is used, contrast dye is used to confirm a proper arthrogram (i.e., outline of the joint).  When ultrasound is used, IV fluid (normal saline) is injected to see the flow of the fluid into the joint.  Once confirmed, then the medication can be injected into the joint.  Oftentimes this medication is a combination of local anesthetics (for diagnostic purposes) and also a steroid (to decrease irritation & inflammation in the joint, also known as sacroilitis).      Medically, a successful RF procedure should provide a patient with 50% pain relief or more for at least  6 months.  Clinical experience suggests that successful patients receive relief more in the range of 12 months on average.  We also discussed that many patients receive therapeutic success from the intraarticular joint injection, and may not require RF ablation.  If a patient receives more than 8 weeks of relief from joint injection, then occasional repeat joint blocks for therapeutic purposes is a very reasonable alternative therapy.  This course of therapy is consistent with our LCDs according to our CMS  in the area, and therefore other insurance providers should follow accordingly.  We will monitor our patients to screen for these therapeutic responders and will offer RF therapy only when necessary.      We discussed that joint injections & also RF procedures are not without risks.  Best practices regarding anticoagulant use & neuraxial procedures will be respected.  Oftentimes a patient on an anticoagulant can be offered a joint injection safely, but again this is not risk-free, and such patients give consent with regards to this increased bleeding risk, which could cause problems including but not limited to worsening of pain, nerve damage, or muscle damage.  Patients that are ill or otherwise may be at risk for sepsis will not have their spines accessed by neuraxial injections of any type.  This patient will not be offered these therapies if there is an increased risk.   We discussed that there is a risk of postprocedural pain and also a risk of worsening of clinical picture with these procedures as with any neuraxial procedure.    ----------  --- Trial Gabapentin 100mg. The patient will be started on a trial of gabapentin. she will be started on gabapentin 100 mg once nightly for one week. Will then increase to twice daily for one week. Patient will then increase to three times daily as tolerated. Reviewed potential side effects, including somnolence and dizziness.   --- Brief discussion about  lumbar MBB/RFA if axial back pain were to worsen.   --- Follow-up for procedure      YARED REPORT  As part of the patient's treatment plan, I am prescribing controlled substances. The patient has been made aware of appropriate use of such medications, including potential risk of somnolence, limited ability to drive and/or work safely, and the potential for dependence or overdose. It has also been made clear that these medications are for use by this patient only, without concomitant use of alcohol or other substances unless prescribed.     Patient has completed prescribing agreement detailing terms of continued prescribing of controlled substances, including monitoring YARED reports, urine drug screening, and pill counts if necessary. The patient is aware that inappropriate use will results in cessation of prescribing such medications.    As the clinician, I personally reviewed the YARED from 8/19/24 while the patient was in the office today.    History and physical exam exhibit continued safe and appropriate use of controlled substances.    Dictated utilizing Dragon dictation.

## 2024-08-20 ENCOUNTER — OFFICE VISIT (OUTPATIENT)
Dept: INTERNAL MEDICINE | Facility: CLINIC | Age: 84
End: 2024-08-20
Payer: MEDICARE

## 2024-08-20 VITALS
BODY MASS INDEX: 20.62 KG/M2 | SYSTOLIC BLOOD PRESSURE: 124 MMHG | HEIGHT: 61 IN | WEIGHT: 109.2 LBS | DIASTOLIC BLOOD PRESSURE: 82 MMHG

## 2024-08-20 DIAGNOSIS — R73.03 PRE-DIABETES: ICD-10-CM

## 2024-08-20 DIAGNOSIS — E03.8 OTHER SPECIFIED HYPOTHYROIDISM: Chronic | ICD-10-CM

## 2024-08-20 DIAGNOSIS — E78.00 HIGH CHOLESTEROL: Primary | Chronic | ICD-10-CM

## 2024-08-20 PROCEDURE — G2211 COMPLEX E/M VISIT ADD ON: HCPCS | Performed by: INTERNAL MEDICINE

## 2024-08-20 PROCEDURE — 99214 OFFICE O/P EST MOD 30 MIN: CPT | Performed by: INTERNAL MEDICINE

## 2024-08-20 PROCEDURE — 1125F AMNT PAIN NOTED PAIN PRSNT: CPT | Performed by: INTERNAL MEDICINE

## 2024-08-20 PROCEDURE — 1159F MED LIST DOCD IN RCRD: CPT | Performed by: INTERNAL MEDICINE

## 2024-08-20 PROCEDURE — 1160F RVW MEDS BY RX/DR IN RCRD: CPT | Performed by: INTERNAL MEDICINE

## 2024-08-20 NOTE — PROGRESS NOTES
Subjective        Chief Complaint   Patient presents with    Prediabetes           Susana Hammonds is a 84 y.o. female who presents for    Patient Active Problem List   Diagnosis    High cholesterol    Other specified hypothyroidism    Vitamin D deficiency    Chronic midline low back pain without sciatica    Non-rheumatic mitral regurgitation    Neck pain    Other constipation    Calf swelling    Pre-diabetes    Current mild episode of major depressive disorder    Sacroiliac joint pain       History of Present Illness       She had an injection in her back at the end of July; it helped for a week. She was started on gabapentin yesterday. She has started ibuprofen which helps. She is not depressed. She feels anxious. She does not want to change her meds. Her son had his first treatment for prostate CA.  Allergies   Allergen Reactions    Fentanyl Nausea And Vomiting       Current Outpatient Medications on File Prior to Visit   Medication Sig Dispense Refill    atorvastatin (LIPITOR) 20 MG tablet       buPROPion XL (Wellbutrin XL) 150 MG 24 hr tablet Take 1 tablet by mouth Daily. 30 tablet 3    Doxylamine Succinate, Sleep, (SLEEP AID PO) Take 1 tablet by mouth Every Night.      gabapentin (NEURONTIN) 100 MG capsule Take 1 capsule by mouth at night for 7 days. If tolerating well, may increase to 1 capsule twice a day with goal of taking medication three times per day 90 capsule 0    HYDROcodone-acetaminophen (Norco) 5-325 MG per tablet Take 1 tablet by mouth Every 12 (Twelve) Hours As Needed for Mild Pain. (Patient taking differently: Take 1 tablet by mouth Every 12 (Twelve) Hours As Needed for Mild Pain. prn) 46 tablet 0    latanoprost (XALATAN) 0.005 % ophthalmic solution 1 drop Every Night.      levothyroxine (SYNTHROID, LEVOTHROID) 50 MCG tablet Take 1 tablet by mouth once daily 90 tablet 2    levothyroxine (SYNTHROID, LEVOTHROID) 75 MCG tablet Take 1 tablet by mouth once daily 90 tablet 3    Polyethylene Glycol 3350  (MIRALAX PO) Take  by mouth Daily.       No current facility-administered medications on file prior to visit.       Past Medical History:   Diagnosis Date    Adenomatous colon polyp     Adnexal cyst     Colitis     Diverticulitis     Injury of back     Laminectomy/foraminotomy/discectomy    Mitral valve insufficiency     mild in 2012    Osteopenia     Vertigo        Past Surgical History:   Procedure Laterality Date    BACK SURGERY      x2    BREAST CYST ASPIRATION      CATARACT EXTRACTION      COLONOSCOPY  2019    dr ortiz    ENDOMETRIAL BIOPSY  2019    EPIDURAL BLOCK      EYE SURGERY  2015    INGUINAL HERNIA REPAIR Bilateral     TONSILLECTOMY      UMBILICAL HERNIA REPAIR      VENTRAL HERNIA REPAIR         Family History   Problem Relation Age of Onset    Asthma Mother     Pancreatic cancer Mother     Heart disease Mother     Cancer Mother     Heart disease Father     Melanoma Son     Other Brother         back surgery    Breast cancer Neg Hx     Ovarian cancer Neg Hx        Social History     Socioeconomic History    Marital status:    Tobacco Use    Smoking status: Former     Current packs/day: 0.00     Average packs/day: 0.5 packs/day for 20.0 years (10.0 ttl pk-yrs)     Types: Cigarettes     Start date: 1969     Quit date: 1989     Years since quittin.3     Passive exposure: Past    Smokeless tobacco: Never   Vaping Use    Vaping status: Never Used   Substance and Sexual Activity    Alcohol use: Not Currently    Drug use: No    Sexual activity: Not Currently           The following portions of the patient's history were reviewed and updated as appropriate: problem list, allergies, current medications, past medical history, past family history, past social history, and past surgical history.    Review of Systems    Immunization History   Administered Date(s) Administered    COVID-19 (PFIZER) BIVALENT 12+YRS 10/25/2022    COVID-19 (PFIZER) Purple Cap Monovalent  "02/06/2021, 02/27/2021, 08/23/2021    Covid-19 (Pfizer) Gray Cap Monovalent 04/09/2022    Fluzone High-Dose 65+YRS 10/11/2020    Fluzone High-Dose 65+yrs 12/16/2021, 09/30/2022, 10/05/2023    Hepatitis A 04/19/2018, 10/19/2018    Pneumococcal Conjugate 13-Valent (PCV13) 04/23/2015    Pneumococcal Polysaccharide (PPSV23) 01/01/2005, 07/01/2020    Shingrix 03/16/2023, 02/16/2024    Tdap 10/01/2014       Objective   Vitals:    08/20/24 0948   BP: 124/82   Weight: 49.5 kg (109 lb 3.2 oz)   Height: 154.9 cm (60.98\")     Body mass index is 20.64 kg/m².  Physical Exam  Vitals reviewed.   Constitutional:       Appearance: She is well-developed.   HENT:      Head: Normocephalic and atraumatic.   Cardiovascular:      Rate and Rhythm: Normal rate and regular rhythm.      Heart sounds: Normal heart sounds, S1 normal and S2 normal.   Pulmonary:      Effort: Pulmonary effort is normal.      Breath sounds: Normal breath sounds.   Skin:     General: Skin is warm.   Neurological:      Mental Status: She is alert.   Psychiatric:         Behavior: Behavior normal.         Procedures    Assessment & Plan   Diagnoses and all orders for this visit:    1. High cholesterol (Primary)  -     Comprehensive Metabolic Panel; Future    2. Pre-diabetes  -     Hemoglobin A1c; Future    3. Other specified hypothyroidism  -     TSH; Future                 Reviewed cmp, flp, tsh and A1c. Labs are stable. She just started gabapentin yesterday. I think she needs to [place].  Return in about 6 months (around 2/20/2025) for Medicare Wellness, Lab Before FUP.  "

## 2024-09-06 NOTE — DISCHARGE INSTRUCTIONS
[facility] - Post-procedure Instructions              --  While there are no absolute restrictions, it is recommended that you do not perform strenuous activity today. In the morning, you may resume your level of activity as before your block.    --  If you have a band-aid at your injection site, please remove it later today. Observe the area for any redness, swelling, pus-like drainage, or a temperature over 101°. If any of these symptoms occur, please call your doctor at [phone]. If after office hours, leave a message and the on-call provider will return your call.    --  Ice may be applied to your injection site. It is recommended you avoid direct heat (heating pad; hot tub) for 1-2 days.    --  Call [facility] at [phone] if you experience persistent headache, persistent bleeding from the injection site, or severe pain not relieved by heat or oral medication.    --  Do not make important decisions today.    --  Due to the effects of the block and/or the Sedation, DO NOT drive or operate hazardous machinery for 12 hours.  Local anesthetics may cause numbness after procedure and precautions must be taken with regards to operating equipment as well as with walking, even if ambulating with assistance of another person or with an assistive device.    --  Do not drink alcohol for 12 hours.    -- You may return to work tomorrow, or as directed by your referring doctor.    --  Occasionally you may notice a slight increase in your pain after the procedure. This should start to improve within the next 24-48 hours. Radiofrequency ablation procedure pain may last 3-4 weeks.    --  It may take as long as 3-4 days before you notice a gradual improvement in your pain and/or other symptoms.    -- You may continue to take your prescribed pain medication as needed.    --  Some normal possible side effects of steroid use could include fluid retention, increased blood sugar, dull headache, increased  sweating, increased appetite, mood swings and flushing.    --  Diabetics are recommended to watch their blood glucose level closely for 24-48 hours after the injection.    --  Must stay in PACU for 20 min upon arrival and prove no leg weakness before being discharged.    --  IN THE EVENT OF A LIFE THREATENING EMERGENCY, (CHEST PAIN, BREATHING DIFFICULTIES, PARALYSIS…) YOU SHOULD GO TO YOUR NEAREST EMERGENCY ROOM.    --  You should be contacted by our office within 2-3 days to schedule follow up or next appointment date (if not already scheduled).  If not contacted within 7 days, please call the office at [phone].

## 2024-09-10 ENCOUNTER — HOSPITAL ENCOUNTER (OUTPATIENT)
Dept: PAIN MEDICINE | Facility: HOSPITAL | Age: 84
Discharge: HOME | End: 2024-09-10
Payer: MEDICARE

## 2024-09-10 ENCOUNTER — HOSPITAL ENCOUNTER (OUTPATIENT)
Dept: GENERAL RADIOLOGY | Facility: HOSPITAL | Age: 84
Discharge: HOME | End: 2024-09-10
Payer: MEDICARE

## 2024-09-10 VITALS
OXYGEN SATURATION: 98 % | SYSTOLIC BLOOD PRESSURE: 157 MMHG | TEMPERATURE: 98 F | HEART RATE: 71 BPM | DIASTOLIC BLOOD PRESSURE: 79 MMHG | RESPIRATION RATE: 16 BRPM

## 2024-09-10 DIAGNOSIS — G89.29 CHRONIC RIGHT SI JOINT PAIN: ICD-10-CM

## 2024-09-10 DIAGNOSIS — R52 PAIN: ICD-10-CM

## 2024-09-10 DIAGNOSIS — M53.3 CHRONIC RIGHT SI JOINT PAIN: ICD-10-CM

## 2024-09-10 DIAGNOSIS — M46.1 SACROILIITIS: ICD-10-CM

## 2024-09-10 PROCEDURE — C1755 CATHETER, INTRASPINAL: HCPCS

## 2024-09-10 PROCEDURE — 25010000002 METHYLPREDNISOLONE PER 40 MG: Performed by: ANESTHESIOLOGY

## 2024-09-10 PROCEDURE — 27096 INJECT SACROILIAC JOINT: CPT | Performed by: ANESTHESIOLOGY

## 2024-09-10 PROCEDURE — 25510000001 IOPAMIDOL 61 % SOLUTION: Performed by: ANESTHESIOLOGY

## 2024-09-10 PROCEDURE — 77002 NEEDLE LOCALIZATION BY XRAY: CPT

## 2024-09-10 RX ORDER — LIDOCAINE HYDROCHLORIDE 10 MG/ML
5 INJECTION, SOLUTION EPIDURAL; INFILTRATION; INTRACAUDAL; PERINEURAL ONCE
Status: COMPLETED | OUTPATIENT
Start: 2024-09-10 | End: 2024-09-10

## 2024-09-10 RX ORDER — DEXAMETHASONE SODIUM PHOSPHATE 10 MG/ML
10 INJECTION, SOLUTION INTRAMUSCULAR; INTRAVENOUS ONCE
Status: DISCONTINUED | OUTPATIENT
Start: 2024-09-10 | End: 2024-09-11 | Stop reason: HOSPADM

## 2024-09-10 RX ORDER — METHYLPREDNISOLONE ACETATE 40 MG/ML
40 INJECTION, SUSPENSION INTRA-ARTICULAR; INTRALESIONAL; INTRAMUSCULAR; SOFT TISSUE ONCE
Status: DISCONTINUED | OUTPATIENT
Start: 2024-09-10 | End: 2024-09-10

## 2024-09-10 RX ORDER — IOPAMIDOL 612 MG/ML
3 INJECTION, SOLUTION INTRAVASCULAR
Status: COMPLETED | OUTPATIENT
Start: 2024-09-10 | End: 2024-09-10

## 2024-09-10 RX ORDER — METHYLPREDNISOLONE ACETATE 40 MG/ML
40 INJECTION, SUSPENSION INTRA-ARTICULAR; INTRALESIONAL; INTRAMUSCULAR; SOFT TISSUE ONCE
Status: COMPLETED | OUTPATIENT
Start: 2024-09-10 | End: 2024-09-10

## 2024-09-10 RX ADMIN — IOPAMIDOL 3 ML: 612 INJECTION, SOLUTION INTRAVENOUS at 10:29

## 2024-09-10 RX ADMIN — LIDOCAINE HYDROCHLORIDE 5 ML: 10 INJECTION, SOLUTION EPIDURAL; INFILTRATION; INTRACAUDAL; PERINEURAL at 10:28

## 2024-09-10 RX ADMIN — METHYLPREDNISOLONE ACETATE 40 MG: 40 INJECTION, SUSPENSION INTRA-ARTICULAR; INTRALESIONAL; INTRAMUSCULAR; SOFT TISSUE at 10:30

## 2024-09-10 NOTE — H&P
Susana Hammonds is a 84 y.o.  female who returns today for a scheduled procedure.  The previous clinic note has been reviewed.  There has been no change in the location or quality of the patient's pain.  The pain is currently rated as 2/10 in severity.  The patient is alert at the time of exam and is emotionally appropriate without significant debilities that would disqualify from the procedure.     Planned Procedure: Right sacroiliac joint injection     Vitals:    09/10/24 0932   BP: 158/88   Pulse: 70   Resp: 16   Temp: 97.6 °F (36.4 °C)   SpO2: 97%       The risks and benefits of the procedure have been discussed with the patient who has elected to proceed.  There are no contraindications to the procedure at this time.  Appropriate consent forms have been signed.  All questions regarding the planned procedure have been addressed.  Please see the separate documentation for details of the interventional procedure.

## 2024-09-10 NOTE — PROCEDURES
Procedures    Right Sacroiliac Joint Injection  [facility]      PREOPERATIVE DIAGNOSIS:   Sacroiliac joint dysfunction    POSTOPERATIVE DIAGNOSIS:  Same    PROCEDURE:  Sacroiliac Joint Injection, with fluoroscopic guidance CPT 07525 -RT    PRE-PROCEDURE DISCUSSION WITH PATIENT:    Risks and complications were discussed with the patient prior to starting the procedure and informed consent was obtained.  We discussed various topics including but not limited to bleeding, infection, injury, postprocedural site soreness, painful flareup, worsening of clinical picture, paralysis, coma, and death.     SURGEON:  Valerie Duran MD    REASON FOR PROCEDURE:    Previous diagnostic positivity of SI joint injection.      SEDATION:  No sedation was used for this procedure  ANESTHETIC AGENT:  1% Lidocaine  STEROID AGENT:  40mg Methylprednisolone    DESCRIPTON OF PROCEDURE:  After obtaining informed consent, IV access was not obtained in the preoperative area.  The patient was transported to the operative suite and placed in the prone position. Heart rate, blood pressure, and pulse oximeter were monitored. The lumbosacral area was prepped with Chloraprep and draped in a sterile fashion. Under fluoroscopic guidance the inferior most portion of the right SI joint was identified. The overlying skin and subcutaneous tissue was anesthetized with 1% lidocaine. A 22-gauge spinal needle was then advanced under fluoroscopic guidance in a coaxial view into the joint space.  After negative aspiration, 1 mL of Isovue 61% was injected, and after seeing appropriate spread into the joint a volume of 3ml of the above medication was injected.    Needle was removed intact.      Vital signs remained stable.  The onset of analgesia was noted.    Pre-procedure pain score: 2/10 at rest, 10/10 with activity  Post-procedure pain score: 0/10      ESTIMATED BLOOD LOSS:  minimal  SPECIMENS:  None  COMPLICATIONS:  No complications were noted. and  There was no indication of vascular uptake on live injection of contrast dye.    TOLERANCE & DISCHARGE CONDITION:    The patient tolerated the procedure well.  The patient was transported to the recovery area without difficulties.  The patient was discharged to home under the care of family in stable and satisfactory condition.    PLAN OF CARE:  The patient was given our standard instruction sheet and will resume all medications as per the medication reconciliation sheet.  The patient will Return to clinic 4-6 wks.  The patient is instructed to keep an account of pain relief after the procedure.

## 2024-09-30 RX ORDER — ATORVASTATIN CALCIUM 20 MG/1
20 TABLET, FILM COATED ORAL DAILY
Qty: 90 TABLET | Refills: 3 | Status: SHIPPED | OUTPATIENT
Start: 2024-09-30

## 2024-10-03 ENCOUNTER — OFFICE VISIT (OUTPATIENT)
Dept: PAIN MEDICINE | Facility: CLINIC | Age: 84
End: 2024-10-03
Payer: MEDICARE

## 2024-10-03 VITALS
DIASTOLIC BLOOD PRESSURE: 84 MMHG | RESPIRATION RATE: 18 BRPM | SYSTOLIC BLOOD PRESSURE: 147 MMHG | OXYGEN SATURATION: 97 % | HEART RATE: 78 BPM | WEIGHT: 108 LBS | HEIGHT: 61 IN | BODY MASS INDEX: 20.39 KG/M2 | TEMPERATURE: 96.6 F

## 2024-10-03 DIAGNOSIS — M47.816 FACET HYPERTROPHY OF LUMBAR REGION: ICD-10-CM

## 2024-10-03 DIAGNOSIS — G89.29 CHRONIC RIGHT SI JOINT PAIN: Primary | ICD-10-CM

## 2024-10-03 DIAGNOSIS — M53.3 CHRONIC RIGHT SI JOINT PAIN: Primary | ICD-10-CM

## 2024-10-03 DIAGNOSIS — M51.360 DEGENERATION OF INTERVERTEBRAL DISC OF LUMBAR REGION WITH DISCOGENIC BACK PAIN: ICD-10-CM

## 2024-10-03 DIAGNOSIS — M46.1 SACROILIITIS: ICD-10-CM

## 2024-10-03 DIAGNOSIS — G89.29 CHRONIC MIDLINE LOW BACK PAIN WITHOUT SCIATICA: ICD-10-CM

## 2024-10-03 DIAGNOSIS — M54.50 CHRONIC MIDLINE LOW BACK PAIN WITHOUT SCIATICA: ICD-10-CM

## 2024-10-03 PROCEDURE — 1160F RVW MEDS BY RX/DR IN RCRD: CPT

## 2024-10-03 PROCEDURE — 1126F AMNT PAIN NOTED NONE PRSNT: CPT

## 2024-10-03 PROCEDURE — 1159F MED LIST DOCD IN RCRD: CPT

## 2024-10-03 PROCEDURE — 99213 OFFICE O/P EST LOW 20 MIN: CPT

## 2024-10-03 RX ORDER — PREGABALIN 50 MG/1
CAPSULE ORAL
Qty: 60 CAPSULE | Refills: 0 | Status: SHIPPED | OUTPATIENT
Start: 2024-10-03

## 2024-10-03 NOTE — PROGRESS NOTES
CHIEF COMPLAINT  Back pain     Subjective   Susana Hammonds is a 84 y.o. female  who presents to the office for follow-up of procedure.  She completed a Right SI Joint Injection on 9/10/2024 performed by Dr. Duran for management of back pain. Patient reports 75% ongoing relief from the procedure. She reports that her overall pain level has improved since the procedure.     Today pain is 0/10VAS in severity (severity in pain varies based on activity level). When pain is present, it radiates into her right buttock. When pain severe, pain can radiates across her low back in bandlike pattern.  Denies radicular pain.  Describes this pain as intermittent aching and burning. Pain is worsened by prolonged sitting/standing, walking long distances, bending/twisting, and house hold chores such as sweeping or vacuuming. Pain improves with rest/reposition. She has tried heat, use of TENS unit, and PT with minimal relief.      Current medication regiment includes OTC Ibuprofen and Tylenol. She has an old supply of Hydrocodone prescribed by Dr. Bishop that she takes when in severe pain. No relief with Bengay or IcyHot.     Unable to take Gabapentin - caused rash     History of lumbar epidurals in the past (2008/2009) prior to laminectomy that were somewhat helpful.     Procedures:  9/10/24 - Right SI Joint Injection - 75% ongoing relief  7/30/24 - Right SI Joint Injection - 75% relief x 10 days     Surgical History:  2010 - L5-S1 Laminectomy - Dr. Barrow    Back Pain  This is a chronic problem. The current episode started more than 1 year ago. The problem occurs intermittently. The problem has been improved since onset. The pain is present in the lumbar spine and sacro-iliac. The quality of the pain is described as burning and aching. The pain radiates to the right buttock. The pain is at a severity of 0/10 (severity in pain varies based on activity level). The pain is moderate. Pertinent negatives include no abdominal pain,  dysuria, fever, headaches, numbness or weakness. She has tried heat and ice (TENS unit,) for the symptoms.     PEG Assessment   What number best describes your pain on average in the past week?4  What number best describes how, during the past week, pain has interfered with your enjoyment of life?4  What number best describes how, during the past week, pain has interfered with your general activity?  5    Review of Pertinent Medical Data ---  MRI OF THE LUMBAR SPINE WITHOUT CONTRAST     CLINICAL HISTORY:chronic low back pain in spite of PT.; M54.50-Low back  pain, unspecified; G89.29-Other chronic pain     TECHNIQUE: MRI of the lumbar spine was obtained with sagittal T1,  proton-density, and T2-weighted images. Additionally, there are axial T1  and T2-weighted images through the lumbar spine.     COMPARISON:There are no previous MRI examinations of the lumbar spine  available for comparison.     FINDINGS:     The conus medullaris terminates at the level of the mid body of L1 and  has normal signal intensity.     There is moderate levoconvex sclerotic curvature of the lumbar spine  with its apex centered at L3.     At T12-L1, there are advanced degenerative disc changes. There is  degenerative endplate marrow edema. There is degenerative retrolisthesis  of T12 on L1 by approximately 3 mm. There is a disc bulge which results  in a mild degree of canal stenosis. Mild facet hypertrophic changes are  noted.     At L1-2, there is a disc bulge which results in mild canal narrowing.  There is moderate facet arthropathy which mildly narrows the right  neural foramen.     At L2-3, there is mild right foraminal narrowing secondary to a disc  osteophyte complex, facet arthropathy, and loss of foraminal height.  There is no significant degree of left foraminal narrowing. No  significant canal stenosis is noted. Prominent degenerative disc changes  are seen on the right side of the disc space     At L3-4, there is advanced  degenerative disc change seen along the right  side of the disc space. There is no significant canal or foraminal  stenosis. There is mild to moderate facet hypertrophic change.     At L4-5, there is degenerative anterior spondylolisthesis of L4 on L5 by  approximately 11 to 12 mm. Unroofed disc material results in mild to  moderate foraminal narrowing in conjunction with moderate facet  hypertrophic change. The facet hypertrophy abuts the posterior aspects  of both L5 nerve root sleeves within the lateral recesses. Unroofed disc  material in the anterior spondylolisthesis of L4 on L5 results in mild  to moderate foraminal narrowing. Prominent degenerative disc changes are  seen throughout the disc space.     At L5-S1, there is been a prior left laminectomy procedure. There is a  disc bulge which abuts both descending S1 nerve root sleeves. There is a  small central disc protrusion which abuts the medial aspect of the  descending right S1 nerve root sleeve. Additionally, there is a small  left subarticular region disc protrusion which abuts and displaces  mildly posteriorly the descending left S1 nerve root sleeve. There is  moderate left foraminal narrowing secondary to loss of foraminal height  and a disc osteophyte complex. There is no significant degree of right  foraminal narrowing. No significant canal stenosis is identified. Again,  advanced degenerative disc changes are seen within the disc space.     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     There has been a prior left laminectomy procedure at L5-S1. At L5-S1,  there is a disc bulge which abuts both descending S1 nerve root sleeves.  There is a small central protrusion as well as a small left subarticular  region disc protrusion. The central disc protrusion abuts the medial  aspect of the right S1 nerve root sleeve and the left subarticular  region disc protrusion abuts and mildly  "posteriorly displaces the  descending left S1 nerve root sleeve. There is moderate left foraminal  narrowing secondary to loss of foraminal height, a disc osteophyte  complex, and facet hypertrophic change. This is the most prominent  foraminal stenosis within the lumbar spine.     At L4-5, there is degenerative anterior spondylolisthesis of L4 on L5 by  approximately 11 to 12 mm. Facet hypertrophic changes at L4-5 abut the  posterior aspects of both L5 nerve root sleeves within the lateral  recesses. Mild to moderate bilateral L4-5 foraminal narrowing is noted.     The remaining multilevel degenerative phenomena within the lumbar spine  are as discussed in detail above.     This report was finalized on 6/23/2024 8:39 AM by Dr. Xiang Black M.D  on Workstation: [id number]    The following portions of the patient's history were reviewed and updated as appropriate: allergies, current medications, past family history, past medical history, past social history, past surgical history, and problem list.    Review of Systems   Constitutional:  Negative for fever.   Gastrointestinal:  Positive for constipation. Negative for abdominal pain and diarrhea.   Genitourinary:  Negative for difficulty urinating and dysuria.   Musculoskeletal:  Positive for back pain.   Neurological:  Negative for weakness, numbness and headaches.   Psychiatric/Behavioral:  Negative for sleep disturbance and suicidal ideas. The patient is nervous/anxious.      I have reviewed and confirmed the accuracy of the ROS as documented by the MA/LPN/RN BRANDO Moreno     Vitals:    10/03/24 1412   BP: 147/84   Pulse: 78   Resp: 18   Temp: 96.6 °F (35.9 °C)   SpO2: 97%   Weight: 49 kg (108 lb)   Height: 154.9 cm (60.98\")   PainSc: 0-No pain     Objective   Physical Exam  Constitutional:       Appearance: Normal appearance.   HENT:      Head: Normocephalic.   Cardiovascular:      Rate and Rhythm: Normal rate and regular rhythm.      Pulses:         "   Dorsalis pedis pulses are 2+ on the right side.        Posterior tibial pulses are 2+ on the right side.   Pulmonary:      Effort: Pulmonary effort is normal.      Breath sounds: Normal breath sounds.   Musculoskeletal:      Cervical back: Normal range of motion.      Lumbar back: Tenderness and bony tenderness present. Decreased range of motion. Negative right straight leg raise test and negative left straight leg raise test.      Right lower leg: Edema (ESTEVAN hose in place) present.      Left lower leg: Edema (ESTEVAN hose in place) present.      Comments: + Right SI joint tenderness     Skin:     General: Skin is warm and dry.      Capillary Refill: Capillary refill takes less than 2 seconds.   Neurological:      General: No focal deficit present.      Mental Status: She is alert and oriented to person, place, and time.   Psychiatric:         Mood and Affect: Mood normal.         Behavior: Behavior normal.         Thought Content: Thought content normal.         Cognition and Memory: Cognition normal.       Assessment & Plan   Diagnoses and all orders for this visit:    1. Chronic right SI joint pain (Primary)  -     pregabalin (LYRICA) 50 MG capsule; Take 1 capsule by mouth at night for 7 days. If tolerating well, may increase to 1 capsule twice a day.  Dispense: 60 capsule; Refill: 0    2. Sacroiliitis  -     pregabalin (LYRICA) 50 MG capsule; Take 1 capsule by mouth at night for 7 days. If tolerating well, may increase to 1 capsule twice a day.  Dispense: 60 capsule; Refill: 0    3. Degeneration of intervertebral disc of lumbar region with discogenic back pain  -     pregabalin (LYRICA) 50 MG capsule; Take 1 capsule by mouth at night for 7 days. If tolerating well, may increase to 1 capsule twice a day.  Dispense: 60 capsule; Refill: 0    4. Facet hypertrophy of lumbar region    5. Chronic midline low back pain without sciatica      Susana G Hammonsd reports a pain score of 0.  Given her pain assessment as noted,  treatment options were discussed and the following options were decided upon as a follow-up plan to address the patient's pain: continuation of current treatment plan for pain and prescription for non-opiod analgesics.    --- D/C Gabapentin.   --- Trial Pregabalin 50mg BID. Discussed medication with the patient.  Included in this discussion was the potential for side effects and adverse events.  Patient verbalized understanding and wished to proceed.  Prescription will be sent to pharmacy.  --- Repeat Right SI Joint Injection every 3 months as needed  --- Follow-up for worsening pain and/or to repeat injection     YARED REPORT  As part of the patient's treatment plan, I am prescribing controlled substances. The patient has been made aware of appropriate use of such medications, including potential risk of somnolence, limited ability to drive and/or work safely, and the potential for dependence or overdose. It has also been made clear that these medications are for use by this patient only, without concomitant use of alcohol or other substances unless prescribed.     Patient has completed prescribing agreement detailing terms of continued prescribing of controlled substances, including monitoring YARED reports, urine drug screening, and pill counts if necessary. The patient is aware that inappropriate use will results in cessation of prescribing such medications.    As the clinician, I personally reviewed the YARED from 10/3/24 while the patient was in the office today.    History and physical exam exhibit continued safe and appropriate use of controlled substances.    Dictated utilizing Dragon dictation.

## 2024-10-07 ENCOUNTER — TELEPHONE (OUTPATIENT)
Dept: PAIN MEDICINE | Facility: CLINIC | Age: 84
End: 2024-10-07

## 2024-10-07 NOTE — TELEPHONE ENCOUNTER
Patient reported 75% ongoing pain relief from SI joint injection during her office visit. Discussed with patient that it was too soon to report injection as it was performed on 9/10/24. She was prescribed Pregabalin 50mg BID. Has she started this medication? If not, recommended she start medication and increase to twice a day. Also recommend she use heat/ice and Tylenol. I can refer her to Dr. Bone, a Neurosurgeon, who specializes in SI joint pain if she would like.

## 2024-10-07 NOTE — TELEPHONE ENCOUNTER
Patient states her pain is intermittent and differs from day to day. The day of her appointment, she had a low pain day and that is why she reported the 75% pain relief. She did try the pregabalin, however she states it made her dizzy and felt like she couldn't get out of bed the next day. She has been taking ibuprofen and tylenol daily. She wants to know if she can get another injection?

## 2024-10-07 NOTE — TELEPHONE ENCOUNTER
Caller: Susana Hammonds    Relationship to patient: Self    Best call back number: [phone]*    Patient is needing: PT IS CALLING BECAUSE SHE IS STILL HAVING A LOT OF PAIN... SHE SAW ENRIQUE BEGUM ON 10/03/24 AND IS STILL VERY UNCOMFORTABLE.. SHE IS WANTING TO SPEAK WITH HER TO SEE IF THERE IS ANYTHING SHE CAN DO IT HELP.. PLEASE ADVISE..

## 2024-10-07 NOTE — TELEPHONE ENCOUNTER
She will not be able to repeat her SI joint injection until 12/10/24. She can repeat an injection every 3 months.

## 2024-10-09 NOTE — TELEPHONE ENCOUNTER
Relayed information to patient. She inquired if there was another type of injection she could have. I offered her an office visit to discuss other options, she declined currently. She did ask if you thought Celebrex would be helpful for her?

## 2024-10-10 NOTE — TELEPHONE ENCOUNTER
Does she have a prescription for Celebrex? If not, I would recommended she discuss with her PCP due to her creatinine/bun ratio being slightly elevated.

## 2024-11-18 ENCOUNTER — TELEPHONE (OUTPATIENT)
Dept: INTERNAL MEDICINE | Facility: CLINIC | Age: 84
End: 2024-11-18
Payer: MEDICARE

## 2024-11-18 NOTE — TELEPHONE ENCOUNTER
Pt called in wanting to get a referral to a hand specialist. She stated that her middle finger on her left hand gets stuck and bent to where she has to push it back up for relief; possible trigger finger. She would like a call back @ [phone] to see if the referral has been given

## 2024-11-21 ENCOUNTER — PREP FOR SURGERY (OUTPATIENT)
Dept: SURGERY | Facility: SURGERY CENTER | Age: 84
End: 2024-11-21
Payer: MEDICARE

## 2024-11-21 ENCOUNTER — TELEPHONE (OUTPATIENT)
Dept: PAIN MEDICINE | Facility: CLINIC | Age: 84
End: 2024-11-21

## 2024-11-21 DIAGNOSIS — M46.1 SACROILIITIS: ICD-10-CM

## 2024-11-21 DIAGNOSIS — G89.29 CHRONIC RIGHT SI JOINT PAIN: Primary | ICD-10-CM

## 2024-11-21 DIAGNOSIS — M53.3 CHRONIC RIGHT SI JOINT PAIN: Primary | ICD-10-CM

## 2024-11-21 NOTE — TELEPHONE ENCOUNTER
Caller: Susana Hammonds    Relationship to patient: Self    Best call back number:     Chief complaint: RIGHT SI JOINT PAIN    Type of visit: INJECTION    Requested date: ASAP

## 2024-11-21 NOTE — TELEPHONE ENCOUNTER
I have placed an order for a Right SI Joint injection to be completed on/after 12/10/24. She will need to be seen in office PRIOR to the procedure.

## 2024-11-22 ENCOUNTER — TRANSCRIBE ORDERS (OUTPATIENT)
Dept: SURGERY | Facility: SURGERY CENTER | Age: 84
End: 2024-11-22
Payer: MEDICARE

## 2024-11-22 DIAGNOSIS — Z41.9 SURGERY, ELECTIVE: Primary | ICD-10-CM

## 2024-12-02 ENCOUNTER — TELEPHONE (OUTPATIENT)
Dept: PAIN MEDICINE | Facility: CLINIC | Age: 84
End: 2024-12-02

## 2024-12-02 NOTE — TELEPHONE ENCOUNTER
Caller: WILD BEATTY     Relationship to patient: PATIENT     Best call back number: [phone]    Chief complaint: BACK PAIN     Type of visit: RIGHT SI JOINT INJECTION SCHEDULED 12/30/2024.  FOLLOW UP SCHEDULED 12/12/2024    Requested date: SI JOINT INJECTION OR ANOTHER PROCEDURE ASAP     If rescheduling, when is the original appointment: 12/30/2024     Additional notes:PATIENT STATES SHE IS TAKING 1000 MG  IBUPROFEN 3 X A DAY .  PATIENT STATES SHE CAN'T DO MUCH OF ANYTHING EXCEPT SIT IN A CHAIR OTHERWISE SOME TYPE OF CHRONIC PAIN .  PATIENT WOULD LIKE TO COME IN ASAP FOR PROCEDURE

## 2024-12-05 NOTE — PROGRESS NOTES
"Subjective   Chief Complaint   Patient presents with    Pain       History of Present Illness   84 y.o. female presents to discus pain management; she is followed by Dr. Bishop. She is currently prescribed Norco 5/325 bid by Dr. Bishop for chronic back pain; it does not help so she does not take it. Instead she is taking Ibuprofen 5-200 mg tablets 1-2x a day which helps.     She took a trip to see her children out west. When she returned mid November she had pain in her lower back and left buttock. She had 2 SI shots without relief. She is scheduled next with Sindhu MICHAELS and scheduled with Dr. Duran for right sacroiliac pain in 3 weeks; previous injection in September which only gave her 2 weeks worth of relief.  No longer taking Lyrica for pain as \"it made me crazy.\" She would like to see [facility] instead.     She called to schedule with Saravanan and Kleinert for trigger finger at Dr. Bishop's recommendation but they cannot see her until March; she would like to see Dr. Sil Samaniego at [facility] instead. Also wishes to return to PT for help with her chronic low back pain, SI joint pain and imbalance.      Patient Active Problem List   Diagnosis    High cholesterol    Other specified hypothyroidism    Vitamin D deficiency    Chronic midline low back pain without sciatica    Non-rheumatic mitral regurgitation    Neck pain    Other constipation    Calf swelling    Pre-diabetes    Current mild episode of major depressive disorder    Sacroiliac joint pain    Chronic right SI joint pain    Sacroiliitis       Allergies   Allergen Reactions    Fentanyl Nausea And Vomiting    Gabapentin Rash       Current Outpatient Medications on File Prior to Visit   Medication Sig Dispense Refill    atorvastatin (LIPITOR) 20 MG tablet TAKE 1 TABLET BY MOUTH DAILY 90 tablet 3    buPROPion XL (Wellbutrin XL) 150 MG 24 hr tablet Take 1 tablet by mouth Daily. 30 tablet 3    Doxylamine Succinate, Sleep, " (SLEEP AID PO) Take 1 tablet by mouth Every Night.      latanoprost (XALATAN) 0.005 % ophthalmic solution 1 drop Every Night.      levothyroxine (SYNTHROID, LEVOTHROID) 50 MCG tablet Take 1 tablet by mouth once daily (Patient taking differently: 3 (Three) Times a Week.) 90 tablet 2    levothyroxine (SYNTHROID, LEVOTHROID) 75 MCG tablet Take 1 tablet by mouth once daily (Patient taking differently: 4 (Four) Times a Week.) 90 tablet 3    Polyethylene Glycol 3350 (MIRALAX PO) Take  by mouth Daily.      [DISCONTINUED] HYDROcodone-acetaminophen (Norco) 5-325 MG per tablet Take 1 tablet by mouth Every 12 (Twelve) Hours As Needed for Mild Pain. (Patient taking differently: Take 1 tablet by mouth Every 12 (Twelve) Hours As Needed for Mild Pain. prn) 46 tablet 0    [DISCONTINUED] pregabalin (LYRICA) 50 MG capsule Take 1 capsule by mouth at night for 7 days. If tolerating well, may increase to 1 capsule twice a day. (Patient not taking: Reported on 2024) 60 capsule 0     No current facility-administered medications on file prior to visit.       Past Medical History:   Diagnosis Date    Adenomatous colon polyp     Adnexal cyst     Colitis     Diverticulitis     Injury of back     Laminectomy/foraminotomy/discectomy    Low back pain     Mitral valve insufficiency     mild in     Osteopenia     Vertigo        Family History   Problem Relation Age of Onset    Asthma Mother     Pancreatic cancer Mother     Heart disease Mother     Cancer Mother     Heart disease Father     Melanoma Son     Other Brother         back surgery    Breast cancer Neg Hx     Ovarian cancer Neg Hx        Social History     Socioeconomic History    Marital status:    Tobacco Use    Smoking status: Former     Current packs/day: 0.00     Average packs/day: 0.7 packs/day for 30.0 years (20.0 ttl pk-yrs)     Types: Cigarettes     Start date: 1969     Quit date: 1989     Years since quittin.6     Passive exposure: Past     "Smokeless tobacco: Never   Vaping Use    Vaping status: Never Used   Substance and Sexual Activity    Alcohol use: Not Currently    Drug use: No    Sexual activity: Not Currently       Past Surgical History:   Procedure Laterality Date    BACK SURGERY      x2    BREAST CYST ASPIRATION      CATARACT EXTRACTION      COLONOSCOPY  04/11/2019    dr ortiz    ENDOMETRIAL BIOPSY  03/07/2019    EPIDURAL BLOCK      EYE SURGERY  02/2015    INGUINAL HERNIA REPAIR Bilateral     SACROILIAC JOINT INJECTION Right 09/10/2024    TONSILLECTOMY      UMBILICAL HERNIA REPAIR      VENTRAL HERNIA REPAIR         The following portions of the patient's history were reviewed and updated as appropriate: problem list, allergies, current medications, past medical history, and past social history.    Review of Systems    Immunization History   Administered Date(s) Administered    COVID-19 (PFIZER) BIVALENT 12+YRS 10/25/2022    COVID-19 (PFIZER) Purple Cap Monovalent 02/06/2021, 02/27/2021, 08/23/2021    Covid-19 (Pfizer) Gray Cap Monovalent 04/09/2022    Fluzone High-Dose 65+YRS 10/11/2020    Fluzone High-Dose 65+yrs 12/16/2021, 09/30/2022, 10/05/2023    Hepatitis A 04/19/2018, 10/19/2018    Pneumococcal Conjugate 13-Valent (PCV13) 04/23/2015    Pneumococcal Polysaccharide (PPSV23) 01/01/2005, 07/01/2020    Shingrix 03/16/2023, 02/16/2024    Tdap 10/01/2014       Objective   Vitals:    12/06/24 1304   Temp: 97.2 °F (36.2 °C)   Weight: 50.3 kg (111 lb)   Height: 154.9 cm (60.98\")     Body mass index is 20.98 kg/m².  Physical Exam  Constitutional:       Appearance: Normal appearance. She is normal weight.   HENT:      Head: Normocephalic and atraumatic.   Pulmonary:      Effort: Pulmonary effort is normal.   Skin:     General: Skin is warm and dry.   Neurological:      Mental Status: She is alert.   Psychiatric:         Mood and Affect: Mood normal.         Behavior: Behavior normal.         Procedures    Assessment & Plan   Diagnoses and all " orders for this visit:    1. Chronic right SI joint pain (Primary)  -     Ambulatory Referral to Pain Management  -     Ambulatory Referral to Physical Therapy for Evaluation & Treatment    2. Chronic midline low back pain without sciatica  -     Ambulatory Referral to Pain Management  -     Ambulatory Referral to Physical Therapy for Evaluation & Treatment    3. Trigger middle finger of left hand  -     Ambulatory Referral to Hand Surgery    4. Imbalance  -     Ambulatory Referral to Physical Therapy for Evaluation & Treatment    She will keep follow up appointment with Sindhu Mcclure next week and I will go ahead and place order for her to be seen by [facility]. Advised to stop taking Ibuprofen 5-200 mg tablets daily but understands it is ok to take Ibuprofen 800 mg with food up to tid prn. Salon Pas patch advised. She is no longer taking Norco or Lyrica as they were ineffective and she has more relief with Ibuprofen. Her kidney function tests have been good.     Records reviewed include previous OV with Dr. Bishop and Sindhu Mcclure APRN.     Return for Follow up with Dr. Bishop in the next month if at all possible.

## 2024-12-06 ENCOUNTER — OFFICE VISIT (OUTPATIENT)
Dept: INTERNAL MEDICINE | Facility: CLINIC | Age: 84
End: 2024-12-06
Payer: MEDICARE

## 2024-12-06 ENCOUNTER — TELEPHONE (OUTPATIENT)
Dept: INTERNAL MEDICINE | Facility: CLINIC | Age: 84
End: 2024-12-06
Payer: MEDICARE

## 2024-12-06 VITALS — BODY MASS INDEX: 20.96 KG/M2 | HEIGHT: 61 IN | TEMPERATURE: 97.2 F | WEIGHT: 111 LBS

## 2024-12-06 DIAGNOSIS — R26.89 IMBALANCE: ICD-10-CM

## 2024-12-06 DIAGNOSIS — M54.50 CHRONIC MIDLINE LOW BACK PAIN WITHOUT SCIATICA: Chronic | ICD-10-CM

## 2024-12-06 DIAGNOSIS — G89.29 CHRONIC RIGHT SI JOINT PAIN: Primary | Chronic | ICD-10-CM

## 2024-12-06 DIAGNOSIS — M53.3 CHRONIC RIGHT SI JOINT PAIN: Primary | Chronic | ICD-10-CM

## 2024-12-06 DIAGNOSIS — G89.29 CHRONIC MIDLINE LOW BACK PAIN WITHOUT SCIATICA: Chronic | ICD-10-CM

## 2024-12-06 DIAGNOSIS — M65.332 TRIGGER MIDDLE FINGER OF LEFT HAND: ICD-10-CM

## 2024-12-06 PROCEDURE — 1159F MED LIST DOCD IN RCRD: CPT | Performed by: NURSE PRACTITIONER

## 2024-12-06 PROCEDURE — 99214 OFFICE O/P EST MOD 30 MIN: CPT | Performed by: NURSE PRACTITIONER

## 2024-12-06 PROCEDURE — 1160F RVW MEDS BY RX/DR IN RCRD: CPT | Performed by: NURSE PRACTITIONER

## 2024-12-06 PROCEDURE — 1126F AMNT PAIN NOTED NONE PRSNT: CPT | Performed by: NURSE PRACTITIONER

## 2024-12-06 NOTE — TELEPHONE ENCOUNTER
Caller: Susana Hammonds    Relationship: Self    Best call back number: [phone]    PATIENT STATES SHE WANTS TO START GOING TO PHYSICAL THERAPY THROUGH THE COMPANY [facility] ON [address].    SHE ALSO WANTS TO GO TO PAIN MANAGEMENT AT:   [facility].  [address]   PHONE: [phone]

## 2024-12-08 DIAGNOSIS — E03.8 OTHER SPECIFIED HYPOTHYROIDISM: ICD-10-CM

## 2024-12-09 RX ORDER — LEVOTHYROXINE SODIUM 75 UG/1
75 TABLET ORAL DAILY
Qty: 90 TABLET | Refills: 3 | Status: SHIPPED | OUTPATIENT
Start: 2024-12-09

## 2024-12-12 ENCOUNTER — PREP FOR SURGERY (OUTPATIENT)
Dept: SURGERY | Facility: SURGERY CENTER | Age: 84
End: 2024-12-12
Payer: MEDICARE

## 2024-12-12 ENCOUNTER — OFFICE VISIT (OUTPATIENT)
Dept: PAIN MEDICINE | Facility: CLINIC | Age: 84
End: 2024-12-12
Payer: MEDICARE

## 2024-12-12 ENCOUNTER — TRANSCRIBE ORDERS (OUTPATIENT)
Dept: SURGERY | Facility: SURGERY CENTER | Age: 84
End: 2024-12-12
Payer: MEDICARE

## 2024-12-12 VITALS
WEIGHT: 110.4 LBS | HEART RATE: 71 BPM | DIASTOLIC BLOOD PRESSURE: 79 MMHG | TEMPERATURE: 97 F | SYSTOLIC BLOOD PRESSURE: 152 MMHG | BODY MASS INDEX: 20.84 KG/M2 | OXYGEN SATURATION: 92 % | HEIGHT: 61 IN

## 2024-12-12 DIAGNOSIS — G89.29 CHRONIC RIGHT-SIDED LOW BACK PAIN WITH RIGHT-SIDED SCIATICA: ICD-10-CM

## 2024-12-12 DIAGNOSIS — G89.29 CHRONIC RIGHT SI JOINT PAIN: ICD-10-CM

## 2024-12-12 DIAGNOSIS — M53.3 CHRONIC RIGHT SI JOINT PAIN: ICD-10-CM

## 2024-12-12 DIAGNOSIS — M48.061 LUMBAR FORAMINAL STENOSIS: Primary | ICD-10-CM

## 2024-12-12 DIAGNOSIS — M54.41 CHRONIC RIGHT-SIDED LOW BACK PAIN WITH RIGHT-SIDED SCIATICA: ICD-10-CM

## 2024-12-12 DIAGNOSIS — M47.816 FACET HYPERTROPHY OF LUMBAR REGION: ICD-10-CM

## 2024-12-12 DIAGNOSIS — Z41.9 SURGERY, ELECTIVE: Primary | ICD-10-CM

## 2024-12-12 DIAGNOSIS — M54.16 LUMBAR RADICULOPATHY: ICD-10-CM

## 2024-12-12 PROCEDURE — 99214 OFFICE O/P EST MOD 30 MIN: CPT

## 2024-12-12 PROCEDURE — 1160F RVW MEDS BY RX/DR IN RCRD: CPT

## 2024-12-12 PROCEDURE — 1159F MED LIST DOCD IN RCRD: CPT

## 2024-12-12 PROCEDURE — 1125F AMNT PAIN NOTED PAIN PRSNT: CPT

## 2024-12-12 NOTE — PROGRESS NOTES
CHIEF COMPLAINT  Back pain     Subjective   Susana Hammonds is a 84 y.o. female  who presents for follow-up.  She has a history of chronic low back pain.     Today pain is 5/10VAS in severity (severity in pain varies based on activity level). Pain is located on the right side of her low back and radiates into right buttock and occasionally right upper thigh. Describes this pain as intermittent aching and burning. Pain is worsened by prolonged sitting/standing, walking long distances, bending/twisting, and house hold chores such as sweeping or vacuuming. Pain improves with rest/reposition. She has tried heat, use of TENS unit, and PT with minimal relief. She plans to restart PT soon to help with balance issues.      Current medication regiment includes OTC Ibuprofen and Tylenol. She has an old supply of Hydrocodone prescribed by Dr. Bishop that she takes when in severe pain. No relief with Bengay or IcyHot.      Unable to take Gabapentin - caused rash, Pregabalin caused lethargy      History of lumbar epidurals in the past (2008/2009) prior to laminectomy that were somewhat helpful.     Procedures:  9/10/24 - Right SI Joint Injection - 75% relief x 2 weeks   7/30/24 - Right SI Joint Injection - 75% relief x 10 days     Surgical History:  2010 - L5-S1 Laminectomy - Dr. Barrow    Back Pain  This is a chronic problem. The current episode started more than 1 year ago. The problem occurs intermittently. The problem has been improved since onset. The pain is present in the lumbar spine and sacro-iliac. The quality of the pain is described as burning and aching. The pain radiates to the right buttock. The pain is at a severity of 0/10 (severity in pain varies based on activity level). The pain is moderate. Pertinent negatives include no abdominal pain, chest pain, dysuria, fever, headaches, numbness or weakness. She has tried heat and ice (TENS unit,) for the symptoms.      PEG Assessment   What number best describes your  "pain on average in the past week?8  What number best describes how, during the past week, pain has interfered with your enjoyment of life?8  What number best describes how, during the past week, pain has interfered with your general activity?  5    Review of Pertinent Medical Data ---  Reviewed office note from BRANDO Wang from 12/6/2024.  Patient presents to discuss further pain management options.  He is currently prescribed hydrocodone 5 mg from Dr. Henriquez for her chronic back pain but states this medication does not help so she is not currently taking it.  He takes ibuprofen 5 200 mg tablets 1-2 times a day.  She notes she has had 2 SI joint injections that did not offer relief.  Lyrica made her feel \"crazy\".  She asked for a referral to [facility] pain management for further evaluation.  She also wishes to return to PT for low back, SI joint pain and imbalance.    MRI OF THE LUMBAR SPINE WITHOUT CONTRAST     CLINICAL HISTORY:chronic low back pain in spite of PT.; M54.50-Low back  pain, unspecified; G89.29-Other chronic pain     TECHNIQUE: MRI of the lumbar spine was obtained with sagittal T1,  proton-density, and T2-weighted images. Additionally, there are axial T1  and T2-weighted images through the lumbar spine.     COMPARISON:There are no previous MRI examinations of the lumbar spine  available for comparison.     FINDINGS:     The conus medullaris terminates at the level of the mid body of L1 and  has normal signal intensity.     There is moderate levoconvex sclerotic curvature of the lumbar spine  with its apex centered at L3.     At T12-L1, there are advanced degenerative disc changes. There is  degenerative endplate marrow edema. There is degenerative retrolisthesis  of T12 on L1 by approximately 3 mm. There is a disc bulge which results  in a mild degree of canal stenosis. Mild facet hypertrophic changes are  noted.     At L1-2, there is a disc bulge which results in mild canal " narrowing.  There is moderate facet arthropathy which mildly narrows the right  neural foramen.     At L2-3, there is mild right foraminal narrowing secondary to a disc  osteophyte complex, facet arthropathy, and loss of foraminal height.  There is no significant degree of left foraminal narrowing. No  significant canal stenosis is noted. Prominent degenerative disc changes  are seen on the right side of the disc space     At L3-4, there is advanced degenerative disc change seen along the right  side of the disc space. There is no significant canal or foraminal  stenosis. There is mild to moderate facet hypertrophic change.     At L4-5, there is degenerative anterior spondylolisthesis of L4 on L5 by  approximately 11 to 12 mm. Unroofed disc material results in mild to  moderate foraminal narrowing in conjunction with moderate facet  hypertrophic change. The facet hypertrophy abuts the posterior aspects  of both L5 nerve root sleeves within the lateral recesses. Unroofed disc  material in the anterior spondylolisthesis of L4 on L5 results in mild  to moderate foraminal narrowing. Prominent degenerative disc changes are  seen throughout the disc space.     At L5-S1, there is been a prior left laminectomy procedure. There is a  disc bulge which abuts both descending S1 nerve root sleeves. There is a  small central disc protrusion which abuts the medial aspect of the  descending right S1 nerve root sleeve. Additionally, there is a small  left subarticular region disc protrusion which abuts and displaces  mildly posteriorly the descending left S1 nerve root sleeve. There is  moderate left foraminal narrowing secondary to loss of foraminal height  and a disc osteophyte complex. There is no significant degree of right  foraminal narrowing. No significant canal stenosis is identified. Again,  advanced degenerative disc changes are seen within the disc space.     IMPRESSION:     There is moderate levoconvex scoliotic  curvature of the lumbar spine  with its apex centered at L3.     There has been a prior left laminectomy procedure at L5-S1. At L5-S1,  there is a disc bulge which abuts both descending S1 nerve root sleeves.  There is a small central protrusion as well as a small left subarticular  region disc protrusion. The central disc protrusion abuts the medial  aspect of the right S1 nerve root sleeve and the left subarticular  region disc protrusion abuts and mildly posteriorly displaces the  descending left S1 nerve root sleeve. There is moderate left foraminal  narrowing secondary to loss of foraminal height, a disc osteophyte  complex, and facet hypertrophic change. This is the most prominent  foraminal stenosis within the lumbar spine.     At L4-5, there is degenerative anterior spondylolisthesis of L4 on L5 by  approximately 11 to 12 mm. Facet hypertrophic changes at L4-5 abut the  posterior aspects of both L5 nerve root sleeves within the lateral  recesses. Mild to moderate bilateral L4-5 foraminal narrowing is noted.     The remaining multilevel degenerative phenomena within the lumbar spine  are as discussed in detail above.     This report was finalized on 6/23/2024 8:39 AM by Dr. Xiang Black M.D  on Workstation: [id number]    The following portions of the patient's history were reviewed and updated as appropriate: allergies, current medications, past family history, past medical history, past social history, past surgical history, and problem list.    Review of Systems   Constitutional:  Positive for activity change (decreased). Negative for chills, fatigue and fever.   HENT:  Negative for congestion.    Eyes:  Negative for visual disturbance.   Respiratory:  Negative for chest tightness and shortness of breath.    Cardiovascular:  Negative for chest pain.   Gastrointestinal:  Negative for abdominal pain, constipation and diarrhea.   Genitourinary:  Negative for difficulty urinating, dyspareunia and dysuria.  "  Musculoskeletal:  Positive for back pain.   Neurological:  Negative for dizziness, weakness, light-headedness, numbness and headaches.   Psychiatric/Behavioral:  Negative for agitation, self-injury, sleep disturbance and suicidal ideas. The patient is not nervous/anxious.      I have reviewed and confirmed the accuracy of the ROS as documented by the MA/LPN/RN BRANDO Moreno    Vitals:    12/12/24 1304   BP: 152/79   Pulse: 71   Temp: 97 °F (36.1 °C)   SpO2: 92%   Weight: 50.1 kg (110 lb 6.4 oz)   Height: 154.9 cm (61\")   PainSc:   5   PainLoc: Back     Objective   Physical Exam  Constitutional:       Appearance: Normal appearance.   HENT:      Head: Normocephalic.   Cardiovascular:      Rate and Rhythm: Normal rate and regular rhythm.      Pulses:           Dorsalis pedis pulses are 2+ on the right side.        Posterior tibial pulses are 2+ on the right side.   Pulmonary:      Effort: Pulmonary effort is normal.      Breath sounds: Normal breath sounds.   Musculoskeletal:      Cervical back: Normal range of motion.      Lumbar back: Tenderness and bony tenderness present. Decreased range of motion. Positive left straight leg raise test. Negative right straight leg raise test.      Right lower leg: Edema (ESTEVAN hose in place) present.      Left lower leg: Edema (ESTEVAN hose in place) present.      Comments: + lumbar facet loading/tenderness   - Right FADI's test and SI joint tenderness        Skin:     General: Skin is warm and dry.      Capillary Refill: Capillary refill takes less than 2 seconds.   Neurological:      General: No focal deficit present.      Mental Status: She is alert and oriented to person, place, and time.   Psychiatric:         Mood and Affect: Mood normal.         Behavior: Behavior normal.         Thought Content: Thought content normal.         Cognition and Memory: Cognition normal.       Assessment & Plan   Diagnoses and all orders for this visit:    1. Lumbar foraminal stenosis " (Primary)    2. Chronic right-sided low back pain with right-sided sciatica    3. Chronic right SI joint pain    4. Facet hypertrophy of lumbar region      Susana Hammonds reports a pain score of 5.  Given her pain assessment as noted, treatment options were discussed and the following options were decided upon as a follow-up plan to address the patient's pain: continuation of current treatment plan for pain and steroid injections.    --- Patient previously noted that she was receiving 75% ongoing relief from her last SI joint injection. Today she states she only received relief x 2 weeks and does not see the point in repeating this injection.  She continues to report right sided low back and buttock/posterior thigh pain. For this reason, will trial a Right S1 TF LESI to see if this is more beneficial to her pain.   ---  Indications for epidural injection:  Plan is to proceed with epidural at the appropriate level.  If the patient receives significant pain reduction and improvement in function and the plan will be to repeat the epidural when the pain worsens.  If a second epidural provides at least 6 weeks of sustained improvement that includes both pain reduction and improvement in function then an epidural injection could be repeated once again at the same level.  This is a mutual decision between the clinician and the patient that includes discussions including risks and benefits in detail as well as alternative therapies.  Patient's questions were answered to their satisfaction and to their understanding.  ---   --- Patient states that her brother is a patient with [facility] and she has an appointment with them next Friday on 12/20/2024 for further evaluation. She asked if I thought she should keep this appointment.  I discussed that she is more than welcome to seek a second opinion in regards to her pain if she would like but discussed that she cannot be a patient of two different pain management clinics  at the same time. Patient verbalized understanding.  She will contact our office if she decides not to proceed with her TF LESI.  --- Follow-up for procedure      YARED REPORT  As the clinician, I personally reviewed the YARED from 12/12/24 while the patient was in the office today    Dictated utilizing Dragon dictation.

## 2024-12-23 DIAGNOSIS — F32.A DEPRESSION, UNSPECIFIED DEPRESSION TYPE: ICD-10-CM

## 2024-12-23 RX ORDER — BUPROPION HYDROCHLORIDE 150 MG/1
150 TABLET ORAL DAILY
Qty: 30 TABLET | Refills: 3 | Status: SHIPPED | OUTPATIENT
Start: 2024-12-23

## 2024-12-27 ENCOUNTER — TELEPHONE (OUTPATIENT)
Dept: PAIN MEDICINE | Facility: CLINIC | Age: 84
End: 2024-12-27

## 2024-12-27 NOTE — TELEPHONE ENCOUNTER
Caller: WILD BEATTY     Relationship to patient: PATIENT     Best call back number: [phone]    Chief complaint: LUMBAR    Type of visit: Right S1 Transforaminal Lumbar Epidural Steroid Injection Cpt: 15301     Requested date: NOT SURE, SHE WILL CALL BACK WHEN READY      If rescheduling, when is the original appointment: 12/30/2024     Additional notes:CX PATIENT STATES SHE JUST HAS TOO MUCH GOING ON RIGHT NOW

## 2025-01-20 RX ORDER — LEVOTHYROXINE SODIUM 50 UG/1
50 TABLET ORAL 3 TIMES WEEKLY
Qty: 90 TABLET | Refills: 3 | Status: SHIPPED | OUTPATIENT
Start: 2025-01-20

## 2025-02-25 ENCOUNTER — OFFICE VISIT (OUTPATIENT)
Dept: INTERNAL MEDICINE | Facility: CLINIC | Age: 85
End: 2025-02-25
Payer: MEDICARE

## 2025-02-25 VITALS
BODY MASS INDEX: 20.96 KG/M2 | DIASTOLIC BLOOD PRESSURE: 92 MMHG | SYSTOLIC BLOOD PRESSURE: 152 MMHG | HEIGHT: 61 IN | WEIGHT: 111 LBS

## 2025-02-25 DIAGNOSIS — G89.29 CHRONIC MIDLINE LOW BACK PAIN WITHOUT SCIATICA: ICD-10-CM

## 2025-02-25 DIAGNOSIS — M54.50 CHRONIC MIDLINE LOW BACK PAIN WITHOUT SCIATICA: ICD-10-CM

## 2025-02-25 DIAGNOSIS — E03.8 OTHER SPECIFIED HYPOTHYROIDISM: Chronic | ICD-10-CM

## 2025-02-25 DIAGNOSIS — Z12.31 ENCOUNTER FOR SCREENING MAMMOGRAM FOR MALIGNANT NEOPLASM OF BREAST: ICD-10-CM

## 2025-02-25 DIAGNOSIS — R73.03 PRE-DIABETES: ICD-10-CM

## 2025-02-25 DIAGNOSIS — I10 ESSENTIAL HYPERTENSION: Chronic | ICD-10-CM

## 2025-02-25 DIAGNOSIS — R94.31 ABNORMAL EKG: ICD-10-CM

## 2025-02-25 DIAGNOSIS — Z00.00 ENCOUNTER FOR SUBSEQUENT ANNUAL WELLNESS VISIT (AWV) IN MEDICARE PATIENT: Primary | ICD-10-CM

## 2025-02-25 PROCEDURE — 1160F RVW MEDS BY RX/DR IN RCRD: CPT | Performed by: INTERNAL MEDICINE

## 2025-02-25 PROCEDURE — 1170F FXNL STATUS ASSESSED: CPT | Performed by: INTERNAL MEDICINE

## 2025-02-25 PROCEDURE — 3077F SYST BP >= 140 MM HG: CPT | Performed by: INTERNAL MEDICINE

## 2025-02-25 PROCEDURE — 1125F AMNT PAIN NOTED PAIN PRSNT: CPT | Performed by: INTERNAL MEDICINE

## 2025-02-25 PROCEDURE — G0439 PPPS, SUBSEQ VISIT: HCPCS | Performed by: INTERNAL MEDICINE

## 2025-02-25 PROCEDURE — 3080F DIAST BP >= 90 MM HG: CPT | Performed by: INTERNAL MEDICINE

## 2025-02-25 PROCEDURE — 99214 OFFICE O/P EST MOD 30 MIN: CPT | Performed by: INTERNAL MEDICINE

## 2025-02-25 PROCEDURE — 93000 ELECTROCARDIOGRAM COMPLETE: CPT | Performed by: INTERNAL MEDICINE

## 2025-02-25 PROCEDURE — 1159F MED LIST DOCD IN RCRD: CPT | Performed by: INTERNAL MEDICINE

## 2025-02-25 RX ORDER — LOSARTAN POTASSIUM 50 MG/1
50 TABLET ORAL DAILY
Qty: 30 TABLET | Refills: 2 | Status: SHIPPED | OUTPATIENT
Start: 2025-02-25

## 2025-02-25 NOTE — PROGRESS NOTES
Subjective   The ABCs of the Annual Wellness Visit  Medicare Wellness Visit      Susana Hammonds is a 84 y.o. patient who presents for a Medicare Wellness Visit.    The following portions of the patient's history were reviewed and   updated as appropriate: allergies, current medications, past family history, past medical history, past social history, past surgical history, and problem list.    Compared to one year ago, the patient's physical   health is worse.  Compared to one year ago, the patient's mental   health is the same.    Recent Hospitalizations:  She was not admitted to the hospital during the last year.     Current Medical Providers:  Patient Care Team:  Sen Bishop MD as PCP - General (Internal Medicine)  Cy Gandara MD as Consulting Physician (Gastroenterology)    Outpatient Medications Prior to Visit   Medication Sig Dispense Refill    atorvastatin (LIPITOR) 20 MG tablet TAKE 1 TABLET BY MOUTH DAILY 90 tablet 3    buPROPion XL (WELLBUTRIN XL) 150 MG 24 hr tablet TAKE 1 TABLET BY MOUTH DAILY 30 tablet 3    Doxylamine Succinate, Sleep, (SLEEP AID PO) Take 1 tablet by mouth Every Night.      latanoprost (XALATAN) 0.005 % ophthalmic solution 1 drop Every Night.      levothyroxine (SYNTHROID, LEVOTHROID) 50 MCG tablet Take 1 tablet by mouth 3 (Three) Times a Week. 90 tablet 3    levothyroxine (SYNTHROID, LEVOTHROID) 75 MCG tablet TAKE 1 TABLET BY MOUTH DAILY 90 tablet 3    Polyethylene Glycol 3350 (MIRALAX PO) Take  by mouth Daily.       No facility-administered medications prior to visit.     No opioid medication identified on active medication list. I have reviewed chart for other potential  high risk medication/s and harmful drug interactions in the elderly.      Aspirin is not on active medication list.  Aspirin use is not indicated based on review of current medical condition/s. Risk of harm outweighs potential benefits.  .    Patient Active Problem List   Diagnosis    High cholesterol  "   Other specified hypothyroidism    Vitamin D deficiency    Chronic midline low back pain without sciatica    Non-rheumatic mitral regurgitation    Neck pain    Other constipation    Calf swelling    Pre-diabetes    Current mild episode of major depressive disorder    Sacroiliac joint pain    Chronic right SI joint pain    Sacroiliitis    Lumbar foraminal stenosis    Lumbar radiculopathy    Essential hypertension     Advance Care Planning Advance Directive is not on file.  ACP discussion was held with the patient during this visit. Patient has an advance directive (not in EMR), copy requested.            Objective   Vitals:    25 1256   BP: 152/92   Weight: 50.3 kg (111 lb)   Height: 154.9 cm (61\")       Estimated body mass index is 20.97 kg/m² as calculated from the following:    Height as of this encounter: 154.9 cm (61\").    Weight as of this encounter: 50.3 kg (111 lb).    BMI is within normal parameters. No other follow-up for BMI required.           Does the patient have evidence of cognitive impairment? No  Lab Results   Component Value Date    HGBA1C 6.0 (H) 2025       ECG 12 Lead    Date/Time: 2025 3:55 PM  Performed by: Sen Bishop MD    Authorized by: Sen Bishop MD  Comparison: not compared with previous ECG   Previous ECG: no previous ECG available  Rhythm: sinus rhythm  Rate: normal  T inversion: V2, V3, V4, V5 and V6  T flattening: II, III and aVF    Clinical impression: abnormal EKG  Comments: Inverted T waves V2-V6 with flattened T waves in II, III, and aVF                                                                                                Health  Risk Assessment    Smoking Status:  Social History     Tobacco Use   Smoking Status Former    Current packs/day: 0.00    Average packs/day: 0.7 packs/day for 30.0 years (20.0 ttl pk-yrs)    Types: Cigarettes    Start date: 1969    Quit date: 1989    Years since quittin.8    Passive exposure: Past "   Smokeless Tobacco Never     Alcohol Consumption:  Social History     Substance and Sexual Activity   Alcohol Use Not Currently       Fall Risk Screen  DORITAADI Fall Risk Assessment was completed, and patient is at LOW risk for falls.Assessment completed on:2025    Depression Screening   Little interest or pleasure in doing things? Not at all   Feeling down, depressed, or hopeless? Not at all   PHQ-2 Total Score 0      Health Habits and Functional and Cognitive Screenin/25/2025     7:07 AM   Functional & Cognitive Status   Do you have difficulty preparing food and eating? No    Do you have difficulty bathing yourself, getting dressed or grooming yourself? No    Do you have difficulty using the toilet? No    Do you have difficulty moving around from place to place? No    Do you have trouble with steps or getting out of a bed or a chair? No    Current Diet Well Balanced Diet    Dental Exam Up to date    Eye Exam Up to date    Exercise (times per week) 0 times per week    Current Exercises Include No Regular Exercise    Do you need help using the phone?  No    Are you deaf or do you have serious difficulty hearing?  No    Do you need help to go to places out of walking distance? No    Do you need help shopping? No    Do you need help preparing meals?  No    Do you need help with housework?  No    Do you need help with laundry? No    Do you need help taking your medications? No    Do you need help managing money? No    Do you ever drive or ride in a car without wearing a seat belt? No    Have you felt unusual stress, anger or loneliness in the last month? No    Who do you live with? Sibling    If you need help, do you have trouble finding someone available to you? No    Have you been bothered in the last four weeks by sexual problems? No    Do you have difficulty concentrating, remembering or making decisions? No        Patient-reported           Age-appropriate Screening Schedule:  Refer to the list below  "for future screening recommendations based on patient's age, sex and/or medical conditions. Orders for these recommended tests are listed in the plan section. The patient has been provided with a written plan.    Health Maintenance List  Health Maintenance   Topic Date Due    RSV Vaccine - Adults (1 - 1-dose 75+ series) Never done    COVID-19 Vaccine (6 - 2024-25 season) 09/01/2024    TDAP/TD VACCINES (2 - Td or Tdap) 10/01/2024    DXA SCAN  12/01/2025 (Originally 12/3/2023)    LIPID PANEL  08/13/2025    ANNUAL WELLNESS VISIT  02/25/2026    INFLUENZA VACCINE  Completed    Pneumococcal Vaccine 50+  Completed    ZOSTER VACCINE  Completed                                                                                                                                                CMS Preventative Services Quick Reference  Risk Factors Identified During Encounter  Immunizations Discussed/Encouraged: Tdap    The above risks/problems have been discussed with the patient.  Pertinent information has been shared with the patient in the After Visit Summary.  An After Visit Summary and PPPS were made available to the patient.    Follow Up:   Next Medicare Wellness visit to be scheduled in 1 year.         Additional E&M Note during same encounter follows:  Patient has additional, significant, and separately identifiable condition(s)/problem(s) that require work above and beyond the Medicare Wellness Visit     Chief Complaint  Medicare Wellness-subsequent    Subjective   HPI AWV And hypothroidism    She had an epidural in January; she got 70 percent relief. It lasted two weeks. She tried lyrica which made her sick. She takes ibuprofen daily. Denies chest pain, dyspnea, melena or hematochezia. She worries about her children. She takes shorter steps with walking.              Objective   Vital Signs:  /92   Ht 154.9 cm (61\")   Wt 50.3 kg (111 lb)   BMI 20.97 kg/m²   Physical Exam  Vitals reviewed.   Constitutional:       " Appearance: She is well-developed.   HENT:      Head: Normocephalic and atraumatic.   Cardiovascular:      Rate and Rhythm: Normal rate and regular rhythm.      Heart sounds: Normal heart sounds, S1 normal and S2 normal.   Pulmonary:      Effort: Pulmonary effort is normal.      Breath sounds: Normal breath sounds.   Abdominal:      Palpations: Abdomen is soft. There is no hepatomegaly or splenomegaly.   Musculoskeletal:      Comments: Gait is slow   Skin:     General: Skin is warm.   Neurological:      Mental Status: She is alert.   Psychiatric:         Behavior: Behavior normal.                 Assessment and Plan               Encounter for subsequent annual wellness visit (AWV) in Medicare patient    Other specified hypothyroidism    Chronic midline low back pain without sciatica    Pre-diabetes    Encounter for screening mammogram for malignant neoplasm of breast    Essential hypertension    Abnormal EKG    Diagnoses and all orders for this visit:    1. Encounter for subsequent annual wellness visit (AWV) in Medicare patient (Primary)    2. Other specified hypothyroidism  Comments:  TSH at goal    3. Chronic midline low back pain without sciatica  Comments:  Epidural gave momentary relief. Discussed risks of NSAIDS. She will call [facility]. I recommend using a cane to prevent falls    4. Pre-diabetes  Comments:  stable    5. Encounter for screening mammogram for malignant neoplasm of breast  -     Mammo Screening Bilateral With CAD; Future    6. Essential hypertension  Comments:  add Losartan  Assessment & Plan:      Orders:  -     losartan (Cozaar) 50 MG tablet; Take 1 tablet by mouth Daily.  Dispense: 30 tablet; Refill: 2  -     Basic Metabolic Panel; Future  -     CBC & Differential; Future  -     ECG 12 Lead    7. Abnormal EKG  Comments:  Declines stress test (cardiolyte)       New Medications Ordered This Visit   Medications    losartan (Cozaar) 50 MG tablet     Sig: Take 1 tablet by mouth Daily.      Dispense:  30 tablet     Refill:  2          Follow Up   Return in about 5 weeks (around 4/1/2025), or 30 minutes.  Patient was given instructions and counseling regarding her condition or for health maintenance advice. Please see specific information pulled into the AVS if appropriate.    Reviewed bmp, tsh and A1c. Add Losartan for HTN. Recc Tdap. Biggest issues is right back pain. She will call pain mgmt. I recommend she use a cane. Declines stress test for abn EKG.

## 2025-03-07 ENCOUNTER — TELEPHONE (OUTPATIENT)
Dept: INTERNAL MEDICINE | Facility: CLINIC | Age: 85
End: 2025-03-07
Payer: MEDICARE

## 2025-03-07 DIAGNOSIS — I10 ESSENTIAL HYPERTENSION: Chronic | ICD-10-CM

## 2025-03-07 NOTE — TELEPHONE ENCOUNTER
PATIENT CALLED AND STATES [facility].    PLEASE RELEASE LAB ORDERS    CALL BACK [phone]

## 2025-03-10 ENCOUNTER — TRANSCRIBE ORDERS (OUTPATIENT)
Dept: ADMINISTRATIVE | Facility: HOSPITAL | Age: 85
End: 2025-03-10
Payer: MEDICARE

## 2025-03-10 DIAGNOSIS — Z12.31 VISIT FOR SCREENING MAMMOGRAM: Primary | ICD-10-CM

## 2025-03-12 LAB
BASOPHILS # BLD AUTO: 0.1 X10E3/UL (ref 0–0.2)
BASOPHILS NFR BLD AUTO: 1 %
BUN SERPL-MCNC: 17 MG/DL (ref 8–27)
BUN/CREAT SERPL: 19 (ref 12–28)
CALCIUM SERPL-MCNC: 9.9 MG/DL (ref 8.7–10.3)
CHLORIDE SERPL-SCNC: 101 MMOL/L (ref 96–106)
CO2 SERPL-SCNC: 23 MMOL/L (ref 20–29)
CREAT SERPL-MCNC: 0.88 MG/DL (ref 0.57–1)
EGFRCR SERPLBLD CKD-EPI 2021: 64 ML/MIN/1.73
EOSINOPHIL # BLD AUTO: 0.2 X10E3/UL (ref 0–0.4)
EOSINOPHIL NFR BLD AUTO: 3 %
ERYTHROCYTE [DISTWIDTH] IN BLOOD BY AUTOMATED COUNT: 12.4 % (ref 11.7–15.4)
GLUCOSE SERPL-MCNC: 113 MG/DL (ref 70–99)
HCT VFR BLD AUTO: 46.2 % (ref 34–46.6)
HGB BLD-MCNC: 15.3 G/DL (ref 11.1–15.9)
IMM GRANULOCYTES # BLD AUTO: 0 X10E3/UL (ref 0–0.1)
IMM GRANULOCYTES NFR BLD AUTO: 0 %
LYMPHOCYTES # BLD AUTO: 1.2 X10E3/UL (ref 0.7–3.1)
LYMPHOCYTES NFR BLD AUTO: 19 %
MCH RBC QN AUTO: 32.1 PG (ref 26.6–33)
MCHC RBC AUTO-ENTMCNC: 33.1 G/DL (ref 31.5–35.7)
MCV RBC AUTO: 97 FL (ref 79–97)
MONOCYTES # BLD AUTO: 0.6 X10E3/UL (ref 0.1–0.9)
MONOCYTES NFR BLD AUTO: 11 %
NEUTROPHILS # BLD AUTO: 4.1 X10E3/UL (ref 1.4–7)
NEUTROPHILS NFR BLD AUTO: 66 %
PLATELET # BLD AUTO: 287 X10E3/UL (ref 150–450)
POTASSIUM SERPL-SCNC: 4.7 MMOL/L (ref 3.5–5.2)
RBC # BLD AUTO: 4.76 X10E6/UL (ref 3.77–5.28)
SODIUM SERPL-SCNC: 138 MMOL/L (ref 134–144)
WBC # BLD AUTO: 6.1 X10E3/UL (ref 3.4–10.8)

## 2025-03-20 ENCOUNTER — TELEPHONE (OUTPATIENT)
Dept: INTERNAL MEDICINE | Facility: CLINIC | Age: 85
End: 2025-03-20
Payer: MEDICARE

## 2025-03-20 NOTE — TELEPHONE ENCOUNTER
Caller: Susana Hammonds    Relationship to patient: Self    Best call back number: [phone]     Patient is needing: PATIENT CALLED STATING THAT THE APPOINTMENT ON 2/25/25 WAS CODED AS A STANDARD OFFICE VISIT INSTEAD OF A PREVENTATIVE VISIT.     BECAUSE OF THIS THE PATIENTS INSURANCE IS NOT WANTING TO COVER THE ENTIRE VISIT. CAN THIS BE RESUBMITTED UNDER HER INSURANCE WITH THE PREVENTATIVE VISIT CODING?    PLEASE CALL PATIENT IF FURTHER INFORMATION IS NEEDED

## 2025-04-15 ENCOUNTER — HOSPITAL ENCOUNTER (OUTPATIENT)
Dept: MAMMOGRAPHY | Facility: HOSPITAL | Age: 85
Discharge: HOME | End: 2025-04-15
Admitting: INTERNAL MEDICINE
Payer: MEDICARE

## 2025-04-15 DIAGNOSIS — Z12.31 VISIT FOR SCREENING MAMMOGRAM: ICD-10-CM

## 2025-04-15 PROCEDURE — 77063 BREAST TOMOSYNTHESIS BI: CPT

## 2025-04-15 PROCEDURE — 77067 SCR MAMMO BI INCL CAD: CPT

## 2025-04-17 DIAGNOSIS — R92.8 ABNORMAL MAMMOGRAM OF BOTH BREASTS: Primary | ICD-10-CM

## 2025-04-18 ENCOUNTER — OFFICE VISIT (OUTPATIENT)
Dept: INTERNAL MEDICINE | Facility: CLINIC | Age: 85
End: 2025-04-18
Payer: MEDICARE

## 2025-04-18 VITALS
WEIGHT: 108.4 LBS | HEIGHT: 61 IN | SYSTOLIC BLOOD PRESSURE: 138 MMHG | DIASTOLIC BLOOD PRESSURE: 82 MMHG | BODY MASS INDEX: 20.47 KG/M2

## 2025-04-18 DIAGNOSIS — Z79.1 ENCOUNTER FOR MONITORING CHRONIC NSAID THERAPY: ICD-10-CM

## 2025-04-18 DIAGNOSIS — M54.16 LUMBAR RADICULOPATHY: ICD-10-CM

## 2025-04-18 DIAGNOSIS — M53.3 CHRONIC RIGHT SI JOINT PAIN: Primary | ICD-10-CM

## 2025-04-18 DIAGNOSIS — Z51.81 ENCOUNTER FOR MONITORING CHRONIC NSAID THERAPY: ICD-10-CM

## 2025-04-18 DIAGNOSIS — I10 ESSENTIAL HYPERTENSION: Chronic | ICD-10-CM

## 2025-04-18 DIAGNOSIS — R73.03 PRE-DIABETES: ICD-10-CM

## 2025-04-18 DIAGNOSIS — E03.8 OTHER SPECIFIED HYPOTHYROIDISM: Chronic | ICD-10-CM

## 2025-04-18 DIAGNOSIS — G89.29 CHRONIC RIGHT SI JOINT PAIN: Primary | ICD-10-CM

## 2025-04-18 NOTE — PROGRESS NOTES
Subjective        Chief Complaint   Patient presents with    Hypertension    Back Pain           Susana Hammonds is a 84 y.o. female who presents for    Patient Active Problem List   Diagnosis    High cholesterol    Other specified hypothyroidism    Vitamin D deficiency    Chronic midline low back pain without sciatica    Non-rheumatic mitral regurgitation    Neck pain    Other constipation    Calf swelling    Pre-diabetes    Current mild episode of major depressive disorder    Sacroiliac joint pain    Chronic right SI joint pain    Sacroiliitis    Lumbar foraminal stenosis    Lumbar radiculopathy    Essential hypertension       History of Present Illness       She would like to get an opinion from Dr. Hood for her back pain. She took duloxetine twice and it caused diarrhea. She tried gabapentin and lyrcia. She felt loopy with both of them. The main discomfort is her lower buttocks. She takes 5 ibuprofen 1-2 times per day. She has not been checking her BP.  Allergies   Allergen Reactions    Fentanyl Nausea And Vomiting    Gabapentin Rash       Current Outpatient Medications on File Prior to Visit   Medication Sig Dispense Refill    atorvastatin (LIPITOR) 20 MG tablet TAKE 1 TABLET BY MOUTH DAILY 90 tablet 3    buPROPion XL (WELLBUTRIN XL) 150 MG 24 hr tablet TAKE 1 TABLET BY MOUTH DAILY 30 tablet 3    Doxylamine Succinate, Sleep, (SLEEP AID PO) Take 1 tablet by mouth Every Night.      latanoprost (XALATAN) 0.005 % ophthalmic solution 1 drop Every Night.      levothyroxine (SYNTHROID, LEVOTHROID) 50 MCG tablet Take 1 tablet by mouth 3 (Three) Times a Week. 90 tablet 3    levothyroxine (SYNTHROID, LEVOTHROID) 75 MCG tablet TAKE 1 TABLET BY MOUTH DAILY 90 tablet 3    losartan (Cozaar) 50 MG tablet Take 1 tablet by mouth Daily. 30 tablet 2    Polyethylene Glycol 3350 (MIRALAX PO) Take  by mouth Daily.       No current facility-administered medications on file prior to visit.       Past Medical History:   Diagnosis Date     Adenomatous colon polyp     Adnexal cyst     Colitis     Diverticulitis     Injury of back     Laminectomy/foraminotomy/discectomy    Mitral valve insufficiency     mild in 2012    Osteopenia     Vertigo        Past Surgical History:   Procedure Laterality Date    BACK SURGERY      x2    BREAST CYST ASPIRATION      CATARACT EXTRACTION      COLONOSCOPY  2019    dr ortiz    ENDOMETRIAL BIOPSY  2019    EYE SURGERY  2015    INGUINAL HERNIA REPAIR Bilateral     SACROILIAC JOINT INJECTION Right 09/10/2024    TONSILLECTOMY      UMBILICAL HERNIA REPAIR      VENTRAL HERNIA REPAIR         Family History   Problem Relation Age of Onset    Asthma Mother     Pancreatic cancer Mother     Heart disease Mother     Cancer Mother     Heart disease Father     Melanoma Son     Breast cancer Neg Hx     Ovarian cancer Neg Hx        Social History     Socioeconomic History    Marital status:    Tobacco Use    Smoking status: Former     Current packs/day: 0.00     Average packs/day: 0.7 packs/day for 30.0 years (20.0 ttl pk-yrs)     Types: Cigarettes     Start date: 1969     Quit date: 1989     Years since quittin.0     Passive exposure: Past    Smokeless tobacco: Never   Vaping Use    Vaping status: Never Used   Substance and Sexual Activity    Alcohol use: Not Currently    Drug use: No    Sexual activity: Not Currently           The following portions of the patient's history were reviewed and updated as appropriate: problem list, allergies, current medications, past medical history, past family history, past social history, and past surgical history.    Review of Systems    Immunization History   Administered Date(s) Administered    COVID-19 (PFIZER) BIVALENT 12+YRS 10/25/2022    COVID-19 (PFIZER) Purple Cap Monovalent 2021, 2021, 2021    Covid-19 (Pfizer) Gray Cap Monovalent 2022    Fluzone High-Dose 65+YRS 10/11/2020, 10/14/2024    Fluzone High-Dose 65+yrs  "12/16/2021, 09/30/2022, 10/05/2023    Hepatitis A 04/19/2018, 10/19/2018    Pneumococcal Conjugate 13-Valent (PCV13) 04/23/2015    Pneumococcal Polysaccharide (PPSV23) 01/01/2005, 07/01/2020    Shingrix 03/16/2023, 02/16/2024    Tdap 10/01/2014       Objective   Vitals:    04/18/25 1242   BP: 138/82   Weight: 49.2 kg (108 lb 6.4 oz)   Height: 154.9 cm (60.98\")     Body mass index is 20.49 kg/m².  Physical Exam  Vitals reviewed.   Constitutional:       Appearance: She is well-developed.   HENT:      Head: Normocephalic and atraumatic.   Cardiovascular:      Rate and Rhythm: Normal rate and regular rhythm.      Heart sounds: Normal heart sounds, S1 normal and S2 normal.   Pulmonary:      Effort: Pulmonary effort is normal.      Breath sounds: Normal breath sounds.   Skin:     General: Skin is warm.   Neurological:      Mental Status: She is alert.   Psychiatric:         Behavior: Behavior normal.         Procedures    Assessment & Plan   Diagnoses and all orders for this visit:    1. Chronic right SI joint pain (Primary)  -     Ambulatory Referral to Pain Management    2. Lumbar radiculopathy  -     Ambulatory Referral to Pain Management    3. Essential hypertension  -     Basic Metabolic Panel; Future    4. Pre-diabetes  -     Hemoglobin A1c; Future    5. Other specified hypothyroidism  -     TSH; Future    6. Encounter for monitoring chronic NSAID therapy  Comments:  I encouraged her to decrease NSAIDS given risk kidney disease and PUD               She would like to get an opinion from Dr. Hood about her back pain; I did explain that there is a possibility that the pain may not improve given age and how long going on and two prior back surgeries. I encouraged her to decrease her NSAID usage.  BP is improved; discussed that pain and NSAIDS can cause to increase.    Return in about 5 months (around 9/18/2025), or 30 minutes, for Lab Before FUP.  "

## 2025-05-11 DIAGNOSIS — F32.A DEPRESSION, UNSPECIFIED DEPRESSION TYPE: ICD-10-CM

## 2025-05-12 RX ORDER — BUPROPION HYDROCHLORIDE 150 MG/1
150 TABLET ORAL DAILY
Qty: 30 TABLET | Refills: 3 | Status: SHIPPED | OUTPATIENT
Start: 2025-05-12

## 2025-05-20 DIAGNOSIS — I10 ESSENTIAL HYPERTENSION: Chronic | ICD-10-CM

## 2025-05-20 RX ORDER — LOSARTAN POTASSIUM 50 MG/1
50 TABLET ORAL DAILY
Qty: 30 TABLET | Refills: 11 | Status: SHIPPED | OUTPATIENT
Start: 2025-05-20

## 2025-05-22 ENCOUNTER — APPOINTMENT (OUTPATIENT)
Dept: ULTRASOUND IMAGING | Facility: HOSPITAL | Age: 85
End: 2025-05-22
Payer: MEDICARE

## 2025-05-22 ENCOUNTER — HOSPITAL ENCOUNTER (OUTPATIENT)
Dept: MAMMOGRAPHY | Facility: HOSPITAL | Age: 85
Discharge: HOME | End: 2025-05-22
Admitting: INTERNAL MEDICINE
Payer: MEDICARE

## 2025-05-22 DIAGNOSIS — R92.8 ABNORMAL MAMMOGRAM OF BOTH BREASTS: ICD-10-CM

## 2025-05-22 PROCEDURE — 77066 DX MAMMO INCL CAD BI: CPT

## 2025-05-22 PROCEDURE — G0279 TOMOSYNTHESIS, MAMMO: HCPCS

## 2025-06-24 ENCOUNTER — OFFICE VISIT (OUTPATIENT)
Dept: INTERNAL MEDICINE | Facility: CLINIC | Age: 85
End: 2025-06-24
Payer: MEDICARE

## 2025-06-24 VITALS
DIASTOLIC BLOOD PRESSURE: 80 MMHG | SYSTOLIC BLOOD PRESSURE: 110 MMHG | HEIGHT: 61 IN | BODY MASS INDEX: 19.83 KG/M2 | WEIGHT: 105 LBS

## 2025-06-24 DIAGNOSIS — R14.0 ABDOMINAL DISTENTION: Primary | ICD-10-CM

## 2025-06-24 DIAGNOSIS — E03.8 OTHER SPECIFIED HYPOTHYROIDISM: Chronic | ICD-10-CM

## 2025-06-24 PROCEDURE — 1125F AMNT PAIN NOTED PAIN PRSNT: CPT | Performed by: INTERNAL MEDICINE

## 2025-06-24 PROCEDURE — 1159F MED LIST DOCD IN RCRD: CPT | Performed by: INTERNAL MEDICINE

## 2025-06-24 PROCEDURE — 99214 OFFICE O/P EST MOD 30 MIN: CPT | Performed by: INTERNAL MEDICINE

## 2025-06-24 PROCEDURE — G2211 COMPLEX E/M VISIT ADD ON: HCPCS | Performed by: INTERNAL MEDICINE

## 2025-06-24 PROCEDURE — 1160F RVW MEDS BY RX/DR IN RCRD: CPT | Performed by: INTERNAL MEDICINE

## 2025-06-24 NOTE — PROGRESS NOTES
Subjective        Chief Complaint   Patient presents with    GI Problem           Susana Hammonds is a 85 y.o. female who presents for    Patient Active Problem List   Diagnosis    High cholesterol    Other specified hypothyroidism    Vitamin D deficiency    Chronic midline low back pain without sciatica    Non-rheumatic mitral regurgitation    Neck pain    Other constipation    Calf swelling    Pre-diabetes    Current mild episode of major depressive disorder    Sacroiliac joint pain    Chronic right SI joint pain    Lumbar foraminal stenosis    Lumbar radiculopathy    Essential hypertension       History of Present Illness       She notices that her abdomen is more prominent over the last year. She saw Dr. Quintana two years ago for the same concern; she recommended spanx. Her weight has gone done 5 pounds from the beginning of the year. She says it can be uncomfortable under her ribs after she eats.. She is worried something is going on. Denies nausea.  Allergies   Allergen Reactions    Fentanyl Nausea And Vomiting    Gabapentin Rash       Current Outpatient Medications on File Prior to Visit   Medication Sig Dispense Refill    atorvastatin (LIPITOR) 20 MG tablet TAKE 1 TABLET BY MOUTH DAILY 90 tablet 3    buPROPion XL (WELLBUTRIN XL) 150 MG 24 hr tablet TAKE 1 TABLET BY MOUTH DAILY 30 tablet 3    Doxylamine Succinate, Sleep, (SLEEP AID PO) Take 1 tablet by mouth Every Night.      latanoprost (XALATAN) 0.005 % ophthalmic solution 1 drop Every Night.      levothyroxine (SYNTHROID, LEVOTHROID) 50 MCG tablet Take 1 tablet by mouth 3 (Three) Times a Week. 90 tablet 3    levothyroxine (SYNTHROID, LEVOTHROID) 75 MCG tablet TAKE 1 TABLET BY MOUTH DAILY 90 tablet 3    losartan (COZAAR) 50 MG tablet TAKE 1 TABLET BY MOUTH DAILY 30 tablet 11    Polyethylene Glycol 3350 (MIRALAX PO) Take  by mouth Daily.       No current facility-administered medications on file prior to visit.       Past Medical History:   Diagnosis Date     Adenomatous colon polyp     Adnexal cyst     Colitis     Diverticulitis     Injury of back     Laminectomy/foraminotomy/discectomy    Mitral valve insufficiency     mild in 2012    Osteopenia     Vertigo        Past Surgical History:   Procedure Laterality Date    BACK SURGERY      x2    BREAST CYST ASPIRATION      CATARACT EXTRACTION      COLONOSCOPY  2019    dr ortiz    ENDOMETRIAL BIOPSY  2019    EYE SURGERY  2015    INGUINAL HERNIA REPAIR Bilateral     SACROILIAC JOINT INJECTION Right 09/10/2024    TONSILLECTOMY      UMBILICAL HERNIA REPAIR      VENTRAL HERNIA REPAIR         Family History   Problem Relation Age of Onset    Asthma Mother     Pancreatic cancer Mother     Heart disease Mother     Cancer Mother     Heart disease Father     Melanoma Son     Breast cancer Neg Hx     Ovarian cancer Neg Hx        Social History     Socioeconomic History    Marital status:    Tobacco Use    Smoking status: Former     Current packs/day: 0.00     Average packs/day: 0.7 packs/day for 30.0 years (20.0 ttl pk-yrs)     Types: Cigarettes     Start date: 1969     Quit date: 1989     Years since quittin.1     Passive exposure: Past    Smokeless tobacco: Never   Vaping Use    Vaping status: Never Used   Substance and Sexual Activity    Alcohol use: Not Currently    Drug use: No    Sexual activity: Not Currently           The following portions of the patient's history were reviewed and updated as appropriate: problem list, allergies, current medications, past medical history, past family history, past social history, and past surgical history.    Review of Systems    Immunization History   Administered Date(s) Administered    COVID-19 (PFIZER) BIVALENT 12+YRS 10/25/2022    COVID-19 (PFIZER) Purple Cap Monovalent 2021, 2021, 2021    Covid-19 (Pfizer) Gray Cap Monovalent 2022    Fluzone High-Dose 65+YRS 10/11/2020, 10/14/2024    Fluzone High-Dose 65+yrs 2021,  "09/30/2022, 10/05/2023    Hepatitis A 04/19/2018, 10/19/2018    Pneumococcal Conjugate 13-Valent (PCV13) 04/23/2015    Pneumococcal Polysaccharide (PPSV23) 01/01/2005, 07/01/2020    Shingrix 03/16/2023, 02/16/2024    Tdap 10/01/2014       Objective   Vitals:    06/24/25 1536   BP: 110/80   Weight: 47.6 kg (105 lb)   Height: 154.9 cm (60.98\")     Body mass index is 19.85 kg/m².  Physical Exam  Vitals reviewed.   Constitutional:       Appearance: She is well-developed.   HENT:      Head: Normocephalic and atraumatic.   Cardiovascular:      Rate and Rhythm: Normal rate and regular rhythm.      Heart sounds: Normal heart sounds, S1 normal and S2 normal.   Pulmonary:      Effort: Pulmonary effort is normal.      Breath sounds: Normal breath sounds.   Abdominal:      Palpations: Abdomen is soft. There is no hepatomegaly or splenomegaly.   Skin:     General: Skin is warm.   Neurological:      Mental Status: She is alert.   Psychiatric:         Behavior: Behavior normal.         Procedures    Assessment & Plan   Diagnoses and all orders for this visit:    1. Abdominal distention (Primary)  -     CBC & Differential  -     Comprehensive Metabolic Panel  -     TSH  -     UA / M With / Rflx Culture([facility] ONLY) - Urine, Clean Catch    2. Other specified hypothyroidism  -     TSH               She is worried that her abdominal distention is more pronounced. She has had a CT of her abdomen in 2021 and an US last year. She has seen Dr. Quintana and is not inclined to wear spanx as are uncomfortable. Discussed that repeat imaging would unlikely show anything different. She will get an abdominal binder.    Return for Lab Today.  "

## 2025-06-25 PROBLEM — M46.1 SACROILIITIS: Status: ACTIVE | Noted: 2024-11-21

## 2025-06-25 LAB
ALBUMIN SERPL-MCNC: 4.6 G/DL (ref 3.5–5.2)
ALBUMIN/GLOB SERPL: 2.2 G/DL
ALP SERPL-CCNC: 63 U/L (ref 39–117)
ALT SERPL-CCNC: 16 U/L (ref 1–33)
AST SERPL-CCNC: 27 U/L (ref 1–32)
BASOPHILS # BLD AUTO: 0.04 10*3/MM3 (ref 0–0.2)
BASOPHILS NFR BLD AUTO: 0.6 % (ref 0–1.5)
BILIRUB SERPL-MCNC: 0.5 MG/DL (ref 0–1.2)
BUN SERPL-MCNC: 19 MG/DL (ref 8–23)
BUN/CREAT SERPL: 21.1 (ref 7–25)
CALCIUM SERPL-MCNC: 10.1 MG/DL (ref 8.6–10.5)
CHLORIDE SERPL-SCNC: 101 MMOL/L (ref 98–107)
CO2 SERPL-SCNC: 26.3 MMOL/L (ref 22–29)
CREAT SERPL-MCNC: 0.9 MG/DL (ref 0.57–1)
EGFRCR SERPLBLD CKD-EPI 2021: 62.4 ML/MIN/1.73
EOSINOPHIL # BLD AUTO: 0.1 10*3/MM3 (ref 0–0.4)
EOSINOPHIL NFR BLD AUTO: 1.4 % (ref 0.3–6.2)
ERYTHROCYTE [DISTWIDTH] IN BLOOD BY AUTOMATED COUNT: 12.5 % (ref 12.3–15.4)
GLOBULIN SER CALC-MCNC: 2.1 GM/DL
GLUCOSE SERPL-MCNC: 100 MG/DL (ref 65–99)
HCT VFR BLD AUTO: 45.7 % (ref 34–46.6)
HGB BLD-MCNC: 15.1 G/DL (ref 12–15.9)
IMM GRANULOCYTES # BLD AUTO: 0.02 10*3/MM3 (ref 0–0.05)
IMM GRANULOCYTES NFR BLD AUTO: 0.3 % (ref 0–0.5)
LYMPHOCYTES # BLD AUTO: 1.05 10*3/MM3 (ref 0.7–3.1)
LYMPHOCYTES NFR BLD AUTO: 14.7 % (ref 19.6–45.3)
MCH RBC QN AUTO: 31.5 PG (ref 26.6–33)
MCHC RBC AUTO-ENTMCNC: 33 G/DL (ref 31.5–35.7)
MCV RBC AUTO: 95.2 FL (ref 79–97)
MONOCYTES # BLD AUTO: 0.83 10*3/MM3 (ref 0.1–0.9)
MONOCYTES NFR BLD AUTO: 11.6 % (ref 5–12)
NEUTROPHILS # BLD AUTO: 5.12 10*3/MM3 (ref 1.7–7)
NEUTROPHILS NFR BLD AUTO: 71.4 % (ref 42.7–76)
NRBC BLD AUTO-RTO: 0 /100 WBC (ref 0–0.2)
PLATELET # BLD AUTO: 285 10*3/MM3 (ref 140–450)
POTASSIUM SERPL-SCNC: 4 MMOL/L (ref 3.5–5.2)
PROT SERPL-MCNC: 6.7 G/DL (ref 6–8.5)
RBC # BLD AUTO: 4.8 10*6/MM3 (ref 3.77–5.28)
SODIUM SERPL-SCNC: 138 MMOL/L (ref 136–145)
TSH SERPL DL<=0.005 MIU/L-ACNC: 5.16 UIU/ML (ref 0.27–4.2)
UNABLE TO VOID: NORMAL
WBC # BLD AUTO: 7.16 10*3/MM3 (ref 3.4–10.8)

## 2025-06-26 ENCOUNTER — TELEPHONE (OUTPATIENT)
Dept: INTERNAL MEDICINE | Facility: CLINIC | Age: 85
End: 2025-06-26
Payer: MEDICARE

## 2025-06-26 ENCOUNTER — TELEPHONE (OUTPATIENT)
Dept: INTERNAL MEDICINE | Facility: CLINIC | Age: 85
End: 2025-06-26

## 2025-06-26 NOTE — TELEPHONE ENCOUNTER
Pt states she is taking levothy 75 mgcm Mon Wed Fri and Sat and 50 mgcm Tue Thurs Sunday please review

## 2025-06-26 NOTE — TELEPHONE ENCOUNTER
PATIENT CALLED IN REGARDS TO HER LAB RESULTS.     PLEASE CALL WHEN RESULTS ARE SEEN  [phone]    SHE WANTS TO KNOW IF SHE NEEDS TO COME BACK FOR URINE SPECIMEN

## 2025-06-27 LAB
APPEARANCE UR: CLEAR
BACTERIA #/AREA URNS HPF: NORMAL /[HPF]
BILIRUB UR QL STRIP: NEGATIVE
CASTS URNS QL MICRO: NORMAL /LPF
COLOR UR: YELLOW
EPI CELLS #/AREA URNS HPF: NORMAL /HPF (ref 0–10)
GLUCOSE UR QL STRIP: NEGATIVE
HGB UR QL STRIP: NEGATIVE
KETONES UR QL STRIP: NEGATIVE
LEUKOCYTE ESTERASE UR QL STRIP: NEGATIVE
MICRO URNS: NORMAL
MICRO URNS: NORMAL
NITRITE UR QL STRIP: NEGATIVE
PH UR STRIP: 7 [PH] (ref 5–7.5)
PROT UR QL STRIP: NEGATIVE
RBC #/AREA URNS HPF: NORMAL /HPF (ref 0–2)
SP GR UR STRIP: 1.01 (ref 1–1.03)
URINALYSIS REFLEX: NORMAL
UROBILINOGEN UR STRIP-MCNC: 0.2 MG/DL (ref 0.2–1)
WBC #/AREA URNS HPF: NORMAL /HPF (ref 0–5)